# Patient Record
Sex: MALE | Race: WHITE | Employment: FULL TIME | URBAN - METROPOLITAN AREA
[De-identification: names, ages, dates, MRNs, and addresses within clinical notes are randomized per-mention and may not be internally consistent; named-entity substitution may affect disease eponyms.]

---

## 2017-04-17 ENCOUNTER — OFFICE VISIT (OUTPATIENT)
Dept: PODIATRY | Age: 62
End: 2017-04-17
Payer: COMMERCIAL

## 2017-04-17 VITALS
SYSTOLIC BLOOD PRESSURE: 137 MMHG | HEIGHT: 74 IN | DIASTOLIC BLOOD PRESSURE: 94 MMHG | WEIGHT: 280 LBS | HEART RATE: 70 BPM | BODY MASS INDEX: 35.94 KG/M2

## 2017-04-17 DIAGNOSIS — M79.671 RIGHT FOOT PAIN: Primary | ICD-10-CM

## 2017-04-17 PROCEDURE — 99213 OFFICE O/P EST LOW 20 MIN: CPT | Performed by: PODIATRIST

## 2017-05-01 ENCOUNTER — OFFICE VISIT (OUTPATIENT)
Dept: PODIATRY | Age: 62
End: 2017-05-01
Payer: COMMERCIAL

## 2017-05-01 VITALS
SYSTOLIC BLOOD PRESSURE: 124 MMHG | WEIGHT: 280 LBS | HEIGHT: 74 IN | DIASTOLIC BLOOD PRESSURE: 81 MMHG | BODY MASS INDEX: 35.94 KG/M2 | HEART RATE: 85 BPM

## 2017-05-01 DIAGNOSIS — M79.671 RIGHT FOOT PAIN: Primary | ICD-10-CM

## 2017-05-01 DIAGNOSIS — M19.90 ARTHRITIS: ICD-10-CM

## 2017-05-01 PROCEDURE — G8417 CALC BMI ABV UP PARAM F/U: HCPCS | Performed by: PODIATRIST

## 2017-05-01 PROCEDURE — 3017F COLORECTAL CA SCREEN DOC REV: CPT | Performed by: PODIATRIST

## 2017-05-01 PROCEDURE — 1036F TOBACCO NON-USER: CPT | Performed by: PODIATRIST

## 2017-05-01 PROCEDURE — G8427 DOCREV CUR MEDS BY ELIG CLIN: HCPCS | Performed by: PODIATRIST

## 2017-05-01 PROCEDURE — 20605 DRAIN/INJ JOINT/BURSA W/O US: CPT | Performed by: PODIATRIST

## 2017-05-01 RX ORDER — ATORVASTATIN CALCIUM 20 MG/1
TABLET, FILM COATED ORAL
COMMUNITY
Start: 2017-05-01 | End: 2017-05-01 | Stop reason: SDUPTHER

## 2017-05-01 RX ORDER — PREDNISONE 20 MG/1
TABLET ORAL
Refills: 0 | Status: ON HOLD | COMMUNITY
Start: 2017-04-17 | End: 2022-02-02 | Stop reason: HOSPADM

## 2017-05-01 RX ADMIN — BETAMETHASONE SODIUM PHOSPHATE AND BETAMETHASONE ACETATE 6 MG: 3; 3 INJECTION, SUSPENSION INTRA-ARTICULAR; INTRALESIONAL; INTRAMUSCULAR; SOFT TISSUE at 16:12

## 2017-05-02 RX ORDER — BETAMETHASONE SODIUM PHOSPHATE AND BETAMETHASONE ACETATE 3; 3 MG/ML; MG/ML
6 INJECTION, SUSPENSION INTRA-ARTICULAR; INTRALESIONAL; INTRAMUSCULAR; SOFT TISSUE ONCE
Status: COMPLETED | OUTPATIENT
Start: 2017-05-01 | End: 2017-05-01

## 2017-05-15 ENCOUNTER — OFFICE VISIT (OUTPATIENT)
Dept: PODIATRY | Age: 62
End: 2017-05-15
Payer: COMMERCIAL

## 2017-05-15 VITALS
SYSTOLIC BLOOD PRESSURE: 127 MMHG | HEART RATE: 84 BPM | HEIGHT: 74 IN | BODY MASS INDEX: 35.94 KG/M2 | WEIGHT: 280 LBS | DIASTOLIC BLOOD PRESSURE: 82 MMHG

## 2017-05-15 DIAGNOSIS — M19.90 ARTHRITIS PAIN: ICD-10-CM

## 2017-05-15 DIAGNOSIS — M79.671 RIGHT FOOT PAIN: Primary | ICD-10-CM

## 2017-05-15 PROCEDURE — 1036F TOBACCO NON-USER: CPT | Performed by: PODIATRIST

## 2017-05-15 PROCEDURE — G8417 CALC BMI ABV UP PARAM F/U: HCPCS | Performed by: PODIATRIST

## 2017-05-15 PROCEDURE — 99213 OFFICE O/P EST LOW 20 MIN: CPT | Performed by: PODIATRIST

## 2017-05-15 PROCEDURE — G8427 DOCREV CUR MEDS BY ELIG CLIN: HCPCS | Performed by: PODIATRIST

## 2017-05-15 PROCEDURE — 3017F COLORECTAL CA SCREEN DOC REV: CPT | Performed by: PODIATRIST

## 2018-01-11 ENCOUNTER — OFFICE VISIT (OUTPATIENT)
Dept: PODIATRY | Age: 63
End: 2018-01-11
Payer: COMMERCIAL

## 2018-01-11 VITALS — WEIGHT: 290 LBS | BODY MASS INDEX: 37.22 KG/M2 | HEIGHT: 74 IN

## 2018-01-11 DIAGNOSIS — M79.672 LEFT FOOT PAIN: Primary | ICD-10-CM

## 2018-01-11 DIAGNOSIS — M72.2 PLANTAR FASCIITIS: ICD-10-CM

## 2018-01-11 PROBLEM — L30.9 DERMATITIS: Status: ACTIVE | Noted: 2017-12-18

## 2018-01-11 PROBLEM — F40.243 FEAR OF FLYING: Status: ACTIVE | Noted: 2017-01-20

## 2018-01-11 PROBLEM — S39.012A STRAIN OF LUMBAR REGION: Status: ACTIVE | Noted: 2017-01-20

## 2018-01-11 PROBLEM — K21.9 GASTROESOPHAGEAL REFLUX DISEASE: Status: ACTIVE | Noted: 2017-01-20

## 2018-01-11 PROBLEM — G47.33 OBSTRUCTIVE SLEEP APNEA SYNDROME: Status: ACTIVE | Noted: 2017-01-20

## 2018-01-11 PROBLEM — J06.9 VIRAL UPPER RESPIRATORY TRACT INFECTION: Status: ACTIVE | Noted: 2017-11-14

## 2018-01-11 PROBLEM — J30.0 ACUTE VASOMOTOR RHINITIS: Status: ACTIVE | Noted: 2017-11-14

## 2018-01-11 PROBLEM — E78.5 HYPERLIPIDEMIA: Status: ACTIVE | Noted: 2017-01-20

## 2018-01-11 PROBLEM — K43.2 VENTRAL INCISIONAL HERNIA: Status: ACTIVE | Noted: 2017-01-20

## 2018-01-11 PROBLEM — I42.9 CARDIOMYOPATHY (HCC): Status: ACTIVE | Noted: 2017-05-17

## 2018-01-11 PROBLEM — L30.9 ECZEMA: Status: ACTIVE | Noted: 2017-01-20

## 2018-01-11 PROBLEM — I10 HYPERTENSION: Status: ACTIVE | Noted: 2017-01-20

## 2018-01-11 PROBLEM — E66.9 ADIPOSITY: Status: ACTIVE | Noted: 2017-01-20

## 2018-01-11 PROBLEM — B00.9 HERPES SIMPLEX TYPE 1 INFECTION: Status: ACTIVE | Noted: 2017-01-20

## 2018-01-11 PROBLEM — M17.9 OSTEOARTHRITIS OF KNEE: Status: ACTIVE | Noted: 2017-01-20

## 2018-01-11 PROBLEM — K56.2 INTESTINAL VOLVULUS (HCC): Status: ACTIVE | Noted: 2017-01-20

## 2018-01-11 PROCEDURE — G8484 FLU IMMUNIZE NO ADMIN: HCPCS | Performed by: PODIATRIST

## 2018-01-11 PROCEDURE — G8427 DOCREV CUR MEDS BY ELIG CLIN: HCPCS | Performed by: PODIATRIST

## 2018-01-11 PROCEDURE — G8417 CALC BMI ABV UP PARAM F/U: HCPCS | Performed by: PODIATRIST

## 2018-01-11 PROCEDURE — L3020 FOOT LONGITUD/METATARSAL SUP: HCPCS | Performed by: PODIATRIST

## 2018-01-11 PROCEDURE — 1036F TOBACCO NON-USER: CPT | Performed by: PODIATRIST

## 2018-01-11 PROCEDURE — 3017F COLORECTAL CA SCREEN DOC REV: CPT | Performed by: PODIATRIST

## 2018-01-11 PROCEDURE — 20550 NJX 1 TENDON SHEATH/LIGAMENT: CPT | Performed by: PODIATRIST

## 2018-01-11 NOTE — PROGRESS NOTES
[facility] Podiatry  Return Patient     Wilton April 60 y.o. male that presents for follow-up of   Chief Complaint   Patient presents with    Foot Pain     Left Foot       Symptoms began 1 week(s) ago and are unchanged . Patient relates pain is Present. Pain is rated 5 out of 10 and is described as constant, mild, moderate. Treatments prior to today's visit include: yes, icing and OTC pain meds. Currently denies F/C/N/V. No Known Allergies    Past Medical History:   Diagnosis Date    Adiposity 1/20/2017    Atrial fibrillation and flutter ([facility] [address]     Initially DX 2010    Cancer ([facility] [address] 8-15    Prostate Cancer, having OR in 10-15    Chronic kidney disease     Fatigue     Fear of flying 1/20/2017    GERD (gastroesophageal reflux disease)     Hyperlipidemia     Hypertension     Osteoarthritis of knee 1/20/2017    Overactive bladder 12/19/2016    Palpitations     Sleep apnea     uses C-pap       Prior to Admission medications    Medication Sig Start Date End Date Taking? Authorizing Provider   predniSONE (DELTASONE) 20 MG tablet TAKE 1 TABLET BY MOUTH THREE TIMES DAILY FOR 3 DAYS THEN 1,TABLET. ..  (REFER TO PRESCRIPTION NOTES). 4/17/17  Yes Historical Provider, MD   ibuprofen (ADVIL;MOTRIN) 400 MG tablet Take 1 tablet by mouth 3 times daily (with meals) 8/16/15  Yes Roxi Keyes MD   sotalol (BETAPACE) 80 MG tablet Take 1 tablet by mouth 2 times daily 8/14/15  Yes Meredith Arambula MD   apixaban (ELIQUIS) 5 MG TABS tablet Take 5 mg by mouth 2 times daily   Yes Historical Provider, MD   atorvastatin (LIPITOR) 20 MG tablet  3/4/15  Yes Historical Provider, MD   pantoprazole (PROTONIX) 40 MG tablet   daily  4/16/15  Yes Historical Provider, MD   VESICARE 5 MG tablet   daily  4/16/15  Yes Historical Provider, MD   tamsulosin (FLOMAX) 0.4 MG capsule   daily  4/22/15  Yes Historical Provider, MD       Review of Systems    Lower Extremity Physical Examination:     Vitals:  There were no vitals The patient is to call any questions, comments, or concerns. Patient was casted for custom molded orthotics today. I feel that these appliances are medically necessary for my patients well being and in my opinion are both reasonable and necessary to the accepted medical practice in the treatment of the above conditions. Custom molded orthotics prevent the over pronation which is leading to excessive tension of the plantar fascia. Abnormal foot/leg functions demands mechanical, functional protections and control. Without custom molded orthotics, the patient may develop chronic pain in her feet. Later on in life, the patient may develop ankle, shin, knee and hip pain as well. In trial usage of felt pads with Lo-Dye ankle strapping to mimic the effects of the orthotics, the patient responded with reduced symptoms and better foot function. Description of the devices: These devices are Root biomechanical orthotic devices made of a plastic polymer with a leather or Spenco-type top cover. These appliances control biomechanical aberrations of the patients feet and accommodate painful areas. Orthotics are not intended to be worn in lieu of shoes, but are removable devices formed from casts of the patients feet and then sent to an independent laboratory for fabrication. Due to the nature of my patients condition, I feel that this therapy is necessary indefinitely, as this is a form of continuous therapy. This is NOT a convenience item. It is my opinion that these devices will prevent the need for costly hospital surgery, further pain and discomfort. The total fee has been itemized to include biomechanical examination, casting of the feet and actual fabrication by the outside laboratory. Verbal and written instructions given to patient. Contact office with any questions/problems/concerns. Orally prescribed OTC orthotics. No orders of the defined types were placed in this encounter.     No

## 2018-01-25 ENCOUNTER — OFFICE VISIT (OUTPATIENT)
Dept: PODIATRY | Age: 63
End: 2018-01-25
Payer: COMMERCIAL

## 2018-01-25 VITALS
RESPIRATION RATE: 16 BRPM | TEMPERATURE: 98.2 F | HEIGHT: 74 IN | SYSTOLIC BLOOD PRESSURE: 126 MMHG | DIASTOLIC BLOOD PRESSURE: 76 MMHG | WEIGHT: 304 LBS | BODY MASS INDEX: 39.01 KG/M2 | HEART RATE: 68 BPM

## 2018-01-25 DIAGNOSIS — M79.672 PAIN IN LEFT FOOT: ICD-10-CM

## 2018-01-25 DIAGNOSIS — M72.2 PLANTAR FASCIAL FIBROMATOSIS: Primary | ICD-10-CM

## 2018-01-25 PROCEDURE — 3017F COLORECTAL CA SCREEN DOC REV: CPT | Performed by: PODIATRIST

## 2018-01-25 PROCEDURE — 99213 OFFICE O/P EST LOW 20 MIN: CPT | Performed by: PODIATRIST

## 2018-01-25 PROCEDURE — G8484 FLU IMMUNIZE NO ADMIN: HCPCS | Performed by: PODIATRIST

## 2018-01-25 PROCEDURE — G8427 DOCREV CUR MEDS BY ELIG CLIN: HCPCS | Performed by: PODIATRIST

## 2018-01-25 PROCEDURE — G8417 CALC BMI ABV UP PARAM F/U: HCPCS | Performed by: PODIATRIST

## 2018-01-25 PROCEDURE — 1036F TOBACCO NON-USER: CPT | Performed by: PODIATRIST

## 2018-01-30 RX ORDER — BETAMETHASONE SODIUM PHOSPHATE AND BETAMETHASONE ACETATE 3; 3 MG/ML; MG/ML
6 INJECTION, SUSPENSION INTRA-ARTICULAR; INTRALESIONAL; INTRAMUSCULAR; SOFT TISSUE ONCE
Status: COMPLETED | OUTPATIENT
Start: 2018-01-11 | End: 2018-01-30

## 2018-01-30 RX ADMIN — BETAMETHASONE SODIUM PHOSPHATE AND BETAMETHASONE ACETATE 6 MG: 3; 3 INJECTION, SUSPENSION INTRA-ARTICULAR; INTRALESIONAL; INTRAMUSCULAR; SOFT TISSUE at 14:09

## 2018-05-16 ENCOUNTER — OFFICE VISIT (OUTPATIENT)
Dept: PODIATRY | Age: 63
End: 2018-05-16
Payer: MEDICARE

## 2018-05-16 VITALS — BODY MASS INDEX: 38.12 KG/M2 | HEIGHT: 74 IN | WEIGHT: 297 LBS

## 2018-05-16 DIAGNOSIS — G57.62 MORTON'S NEUROMA OF LEFT FOOT: Primary | ICD-10-CM

## 2018-05-16 DIAGNOSIS — M79.672 PAIN IN LEFT FOOT: ICD-10-CM

## 2018-05-16 PROCEDURE — 73630 X-RAY EXAM OF FOOT: CPT | Performed by: PODIATRIST

## 2018-05-16 PROCEDURE — 99213 OFFICE O/P EST LOW 20 MIN: CPT | Performed by: PODIATRIST

## 2018-05-16 PROCEDURE — 20550 NJX 1 TENDON SHEATH/LIGAMENT: CPT | Performed by: PODIATRIST

## 2018-05-16 RX ORDER — BETAMETHASONE SODIUM PHOSPHATE AND BETAMETHASONE ACETATE 3; 3 MG/ML; MG/ML
6 INJECTION, SUSPENSION INTRA-ARTICULAR; INTRALESIONAL; INTRAMUSCULAR; SOFT TISSUE ONCE
Status: COMPLETED | OUTPATIENT
Start: 2018-05-16 | End: 2018-05-30

## 2018-05-30 ENCOUNTER — OFFICE VISIT (OUTPATIENT)
Dept: PODIATRY | Age: 63
End: 2018-05-30
Payer: MEDICARE

## 2018-05-30 VITALS — BODY MASS INDEX: 38.12 KG/M2 | HEIGHT: 74 IN | WEIGHT: 297 LBS

## 2018-05-30 DIAGNOSIS — G57.62 MORTON'S NEUROMA OF LEFT FOOT: Primary | ICD-10-CM

## 2018-05-30 DIAGNOSIS — M79.672 PAIN IN LEFT FOOT: ICD-10-CM

## 2018-05-30 PROCEDURE — 99213 OFFICE O/P EST LOW 20 MIN: CPT | Performed by: PODIATRIST

## 2018-05-30 RX ORDER — ATORVASTATIN CALCIUM 20 MG/1
20 TABLET, FILM COATED ORAL
COMMUNITY
Start: 2018-02-21

## 2018-05-30 RX ADMIN — BETAMETHASONE SODIUM PHOSPHATE AND BETAMETHASONE ACETATE 6 MG: 3; 3 INJECTION, SUSPENSION INTRA-ARTICULAR; INTRALESIONAL; INTRAMUSCULAR; SOFT TISSUE at 15:56

## 2018-06-01 RX ORDER — BETAMETHASONE SODIUM PHOSPHATE AND BETAMETHASONE ACETATE 3; 3 MG/ML; MG/ML
3 INJECTION, SUSPENSION INTRA-ARTICULAR; INTRALESIONAL; INTRAMUSCULAR; SOFT TISSUE ONCE
Status: SHIPPED | OUTPATIENT
Start: 2018-06-01

## 2019-11-07 ENCOUNTER — HOSPITAL ENCOUNTER (OUTPATIENT)
Age: 64
Discharge: HOME | End: 2019-11-07
Payer: COMMERCIAL

## 2019-11-07 ENCOUNTER — HOSPITAL ENCOUNTER (OUTPATIENT)
Dept: GENERAL RADIOLOGY | Age: 64
Discharge: HOME | End: 2019-11-09
Payer: COMMERCIAL

## 2019-11-07 DIAGNOSIS — T14.90XA INJURY: ICD-10-CM

## 2019-11-07 PROCEDURE — 73630 X-RAY EXAM OF FOOT: CPT

## 2020-04-06 ENCOUNTER — APPOINTMENT (OUTPATIENT)
Dept: CT IMAGING | Facility: HOSPITAL | Age: 65
End: 2020-04-06
Attending: EMERGENCY MEDICINE
Payer: MEDICARE

## 2020-04-06 ENCOUNTER — HOSPITAL ENCOUNTER (EMERGENCY)
Facility: HOSPITAL | Age: 65
Discharge: HOME | End: 2020-04-07
Attending: EMERGENCY MEDICINE
Payer: MEDICARE

## 2020-04-06 DIAGNOSIS — T14.8XXA ABRASION: Primary | ICD-10-CM
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
## 2020-04-06 PROCEDURE — 99284 EMERGENCY DEPT VISIT MOD MDM: CPT

## 2020-04-06 PROCEDURE — 72125 CT NECK SPINE W/O DYE: CPT | Performed by: EMERGENCY MEDICINE

## 2020-04-06 PROCEDURE — 70450 CT HEAD/BRAIN W/O DYE: CPT | Performed by: EMERGENCY MEDICINE

## 2020-04-07 VITALS
SYSTOLIC BLOOD PRESSURE: 137 MMHG | DIASTOLIC BLOOD PRESSURE: 91 MMHG | WEIGHT: 160 LBS | HEART RATE: 77 BPM | OXYGEN SATURATION: 99 % | BODY MASS INDEX: 22.4 KG/M2 | RESPIRATION RATE: 18 BRPM | TEMPERATURE: 98 F | HEIGHT: 71 IN

## 2020-04-07 NOTE — ED PROVIDER NOTES
Patient Seen in: [facility] AND [facility] Emergency Department      History   Patient presents with:  Fall    Stated Complaint: fall    HPI    72 yo male with h/o dementia, unable to give history. Found on the ground by NH staff. He has no complaints.      [facility] ED Course   Labs Reviewed - No data to display               MDM        CT head without contrast  CT cervical spine without contrast    IMPRESSION:    Head:  No acute intracranial injury. No acute intracranial hemorrhage, mass effect, or midline shift.

## 2020-04-07 NOTE — ED INITIAL ASSESSMENT (HPI)
Patient presents from nursing facility after being found on the floor; staff believes patient fell. Patient states he believes he might have \"slipped. \" Patient has no complaints at this time.

## 2022-01-23 ENCOUNTER — HOSPITAL ENCOUNTER (INPATIENT)
Age: 67
LOS: 2 days | Discharge: HOME | DRG: 310 | End: 2022-01-25
Attending: EMERGENCY MEDICINE | Admitting: INTERNAL MEDICINE
Payer: MEDICARE

## 2022-01-23 ENCOUNTER — APPOINTMENT (OUTPATIENT)
Dept: GENERAL RADIOLOGY | Age: 67
DRG: 310 | End: 2022-01-23
Payer: MEDICARE

## 2022-01-23 DIAGNOSIS — I48.91 ATRIAL FIBRILLATION WITH RVR (HCC): Primary | ICD-10-CM

## 2022-01-23 PROBLEM — Z90.79 S/P PROSTATECTOMY: Status: ACTIVE | Noted: 2022-01-23

## 2022-01-23 LAB
ABSOLUTE EOS #: 0.22 K/UL (ref 0–0.44)
ABSOLUTE IMMATURE GRANULOCYTE: 0.04 K/UL (ref 0–0.3)
ABSOLUTE LYMPH #: 1.57 K/UL (ref 1.1–3.7)
ABSOLUTE MONO #: 0.59 K/UL (ref 0.1–1.2)
ANION GAP SERPL CALCULATED.3IONS-SCNC: 11 MMOL/L (ref 9–17)
BASOPHILS # BLD: 1 % (ref 0–2)
BASOPHILS ABSOLUTE: 0.08 K/UL (ref 0–0.2)
BNP INTERPRETATION: NORMAL
BUN BLDV-MCNC: 15 MG/DL (ref 8–23)
BUN/CREAT BLD: NORMAL (ref 9–20)
CALCIUM SERPL-MCNC: 9.3 MG/DL (ref 8.6–10.4)
CHLORIDE BLD-SCNC: 107 MMOL/L (ref 98–107)
CO2: 23 MMOL/L (ref 20–31)
CREAT SERPL-MCNC: 1.2 MG/DL (ref 0.7–1.2)
DIFFERENTIAL TYPE: ABNORMAL
EOSINOPHILS RELATIVE PERCENT: 4 % (ref 1–4)
GFR AFRICAN AMERICAN: >60 ML/MIN
GFR NON-AFRICAN AMERICAN: >60 ML/MIN
GFR SERPL CREATININE-BSD FRML MDRD: NORMAL ML/MIN/{1.73_M2}
GFR SERPL CREATININE-BSD FRML MDRD: NORMAL ML/MIN/{1.73_M2}
GLUCOSE BLD-MCNC: 94 MG/DL (ref 70–99)
HCT VFR BLD CALC: 47.8 % (ref 40.7–50.3)
HEMOGLOBIN: 15.9 G/DL (ref 13–17)
IMMATURE GRANULOCYTES: 1 %
LYMPHOCYTES # BLD: 25 % (ref 24–43)
MCH RBC QN AUTO: 27.9 PG (ref 25.2–33.5)
MCHC RBC AUTO-ENTMCNC: 33.3 G/DL (ref 28.4–34.8)
MCV RBC AUTO: 84 FL (ref 82.6–102.9)
MONOCYTES # BLD: 9 % (ref 3–12)
NRBC AUTOMATED: 0 PER 100 WBC
PARTIAL THROMBOPLASTIN TIME: 26.1 SEC (ref 20.5–30.5)
PARTIAL THROMBOPLASTIN TIME: 64.2 SEC (ref 20.5–30.5)
PARTIAL THROMBOPLASTIN TIME: >120 SEC (ref 20.5–30.5)
PDW BLD-RTO: 14.4 % (ref 11.8–14.4)
PLATELET # BLD: 177 K/UL (ref 138–453)
PLATELET ESTIMATE: ABNORMAL
PMV BLD AUTO: 11 FL (ref 8.1–13.5)
POTASSIUM SERPL-SCNC: 4 MMOL/L (ref 3.7–5.3)
PRO-BNP: 160 PG/ML
RBC # BLD: 5.69 M/UL (ref 4.21–5.77)
RBC # BLD: ABNORMAL 10*6/UL
SEG NEUTROPHILS: 60 % (ref 36–65)
SEGMENTED NEUTROPHILS ABSOLUTE COUNT: 3.86 K/UL (ref 1.5–8.1)
SODIUM BLD-SCNC: 141 MMOL/L (ref 135–144)
TROPONIN INTERP: NORMAL
TROPONIN INTERP: NORMAL
TROPONIN T: NORMAL NG/ML
TROPONIN T: NORMAL NG/ML
TROPONIN, HIGH SENSITIVITY: 11 NG/L (ref 0–22)
TROPONIN, HIGH SENSITIVITY: 12 NG/L (ref 0–22)
WBC # BLD: 6.4 K/UL (ref 3.5–11.3)
WBC # BLD: ABNORMAL 10*3/UL

## 2022-01-23 PROCEDURE — 2580000003 HC RX 258: Performed by: STUDENT IN AN ORGANIZED HEALTH CARE EDUCATION/TRAINING PROGRAM

## 2022-01-23 PROCEDURE — 96376 TX/PRO/DX INJ SAME DRUG ADON: CPT

## 2022-01-23 PROCEDURE — 6360000002 HC RX W HCPCS: Performed by: STUDENT IN AN ORGANIZED HEALTH CARE EDUCATION/TRAINING PROGRAM

## 2022-01-23 PROCEDURE — 99282 EMERGENCY DEPT VISIT SF MDM: CPT

## 2022-01-23 PROCEDURE — 71045 X-RAY EXAM CHEST 1 VIEW: CPT

## 2022-01-23 PROCEDURE — 85730 THROMBOPLASTIN TIME PARTIAL: CPT

## 2022-01-23 PROCEDURE — 83880 ASSAY OF NATRIURETIC PEPTIDE: CPT

## 2022-01-23 PROCEDURE — 99223 1ST HOSP IP/OBS HIGH 75: CPT | Performed by: INTERNAL MEDICINE

## 2022-01-23 PROCEDURE — 2500000003 HC RX 250 WO HCPCS

## 2022-01-23 PROCEDURE — 84484 ASSAY OF TROPONIN QUANT: CPT

## 2022-01-23 PROCEDURE — 2060000000 HC ICU INTERMEDIATE R&B

## 2022-01-23 PROCEDURE — 2500000003 HC RX 250 WO HCPCS: Performed by: STUDENT IN AN ORGANIZED HEALTH CARE EDUCATION/TRAINING PROGRAM

## 2022-01-23 PROCEDURE — 85025 COMPLETE CBC W/AUTO DIFF WBC: CPT

## 2022-01-23 PROCEDURE — 93005 ELECTROCARDIOGRAM TRACING: CPT | Performed by: STUDENT IN AN ORGANIZED HEALTH CARE EDUCATION/TRAINING PROGRAM

## 2022-01-23 PROCEDURE — 6360000002 HC RX W HCPCS

## 2022-01-23 PROCEDURE — 96374 THER/PROPH/DIAG INJ IV PUSH: CPT

## 2022-01-23 PROCEDURE — 80048 BASIC METABOLIC PNL TOTAL CA: CPT

## 2022-01-23 RX ORDER — METOPROLOL TARTRATE 5 MG/5ML
5 INJECTION INTRAVENOUS EVERY 5 MIN PRN
Status: DISCONTINUED | OUTPATIENT
Start: 2022-01-23 | End: 2022-01-23

## 2022-01-23 RX ORDER — DIGOXIN 0.25 MG/ML
250 INJECTION INTRAMUSCULAR; INTRAVENOUS ONCE
Status: COMPLETED | OUTPATIENT
Start: 2022-01-23 | End: 2022-01-23

## 2022-01-23 RX ORDER — POTASSIUM CHLORIDE 20 MEQ/1
40 TABLET, EXTENDED RELEASE ORAL PRN
Status: DISCONTINUED | OUTPATIENT
Start: 2022-01-23 | End: 2022-01-25 | Stop reason: HOSPADM

## 2022-01-23 RX ORDER — POTASSIUM CHLORIDE 7.45 MG/ML
10 INJECTION INTRAVENOUS PRN
Status: DISCONTINUED | OUTPATIENT
Start: 2022-01-23 | End: 2022-01-25 | Stop reason: HOSPADM

## 2022-01-23 RX ORDER — HEPARIN SODIUM 1000 [USP'U]/ML
10000 INJECTION, SOLUTION INTRAVENOUS; SUBCUTANEOUS PRN
Status: DISCONTINUED | OUTPATIENT
Start: 2022-01-23 | End: 2022-01-25 | Stop reason: HOSPADM

## 2022-01-23 RX ORDER — ONDANSETRON 4 MG/1
4 TABLET, ORALLY DISINTEGRATING ORAL EVERY 8 HOURS PRN
Status: DISCONTINUED | OUTPATIENT
Start: 2022-01-23 | End: 2022-01-23

## 2022-01-23 RX ORDER — PANTOPRAZOLE SODIUM 40 MG/1
40 TABLET, DELAYED RELEASE ORAL
Status: DISCONTINUED | OUTPATIENT
Start: 2022-01-24 | End: 2022-01-25 | Stop reason: HOSPADM

## 2022-01-23 RX ORDER — HEPARIN SODIUM 1000 [USP'U]/ML
4000 INJECTION, SOLUTION INTRAVENOUS; SUBCUTANEOUS ONCE
Status: DISCONTINUED | OUTPATIENT
Start: 2022-01-23 | End: 2022-01-23

## 2022-01-23 RX ORDER — POLYETHYLENE GLYCOL 3350 17 G/17G
17 POWDER, FOR SOLUTION ORAL DAILY PRN
Status: DISCONTINUED | OUTPATIENT
Start: 2022-01-23 | End: 2022-01-25 | Stop reason: HOSPADM

## 2022-01-23 RX ORDER — ONDANSETRON 2 MG/ML
4 INJECTION INTRAMUSCULAR; INTRAVENOUS EVERY 6 HOURS PRN
Status: DISCONTINUED | OUTPATIENT
Start: 2022-01-23 | End: 2022-01-25 | Stop reason: HOSPADM

## 2022-01-23 RX ORDER — DILTIAZEM HYDROCHLORIDE 5 MG/ML
20 INJECTION INTRAVENOUS ONCE
Status: DISCONTINUED | OUTPATIENT
Start: 2022-01-23 | End: 2022-01-23

## 2022-01-23 RX ORDER — ACETAMINOPHEN 325 MG/1
650 TABLET ORAL EVERY 6 HOURS PRN
Status: DISCONTINUED | OUTPATIENT
Start: 2022-01-23 | End: 2022-01-25 | Stop reason: HOSPADM

## 2022-01-23 RX ORDER — HEPARIN SODIUM 1000 [USP'U]/ML
10000 INJECTION, SOLUTION INTRAVENOUS; SUBCUTANEOUS ONCE
Status: COMPLETED | OUTPATIENT
Start: 2022-01-23 | End: 2022-01-23

## 2022-01-23 RX ORDER — DILTIAZEM HYDROCHLORIDE 5 MG/ML
INJECTION INTRAVENOUS
Status: COMPLETED
Start: 2022-01-23 | End: 2022-01-23

## 2022-01-23 RX ORDER — HEPARIN SODIUM 1000 [USP'U]/ML
5000 INJECTION, SOLUTION INTRAVENOUS; SUBCUTANEOUS PRN
Status: DISCONTINUED | OUTPATIENT
Start: 2022-01-23 | End: 2022-01-25 | Stop reason: HOSPADM

## 2022-01-23 RX ORDER — DILTIAZEM HYDROCHLORIDE 5 MG/ML
20 INJECTION INTRAVENOUS ONCE
Status: COMPLETED | OUTPATIENT
Start: 2022-01-23 | End: 2022-01-23

## 2022-01-23 RX ORDER — SODIUM CHLORIDE 0.9 % (FLUSH) 0.9 %
5-40 SYRINGE (ML) INJECTION EVERY 12 HOURS SCHEDULED
Status: DISCONTINUED | OUTPATIENT
Start: 2022-01-23 | End: 2022-01-25 | Stop reason: HOSPADM

## 2022-01-23 RX ORDER — ONDANSETRON 2 MG/ML
4 INJECTION INTRAMUSCULAR; INTRAVENOUS EVERY 6 HOURS PRN
Status: DISCONTINUED | OUTPATIENT
Start: 2022-01-23 | End: 2022-01-23

## 2022-01-23 RX ORDER — HEPARIN SODIUM 1000 [USP'U]/ML
4000 INJECTION, SOLUTION INTRAVENOUS; SUBCUTANEOUS PRN
Status: DISCONTINUED | OUTPATIENT
Start: 2022-01-23 | End: 2022-01-23

## 2022-01-23 RX ORDER — SODIUM CHLORIDE 0.9 % (FLUSH) 0.9 %
5-40 SYRINGE (ML) INJECTION PRN
Status: DISCONTINUED | OUTPATIENT
Start: 2022-01-23 | End: 2022-01-25 | Stop reason: HOSPADM

## 2022-01-23 RX ORDER — SODIUM CHLORIDE 9 MG/ML
25 INJECTION, SOLUTION INTRAVENOUS PRN
Status: DISCONTINUED | OUTPATIENT
Start: 2022-01-23 | End: 2022-01-25 | Stop reason: HOSPADM

## 2022-01-23 RX ORDER — DILTIAZEM HYDROCHLORIDE 5 MG/ML
5 INJECTION INTRAVENOUS ONCE
Status: DISCONTINUED | OUTPATIENT
Start: 2022-01-23 | End: 2022-01-23

## 2022-01-23 RX ORDER — ACETAMINOPHEN 325 MG/1
650 TABLET ORAL EVERY 4 HOURS PRN
Status: DISCONTINUED | OUTPATIENT
Start: 2022-01-23 | End: 2022-01-23

## 2022-01-23 RX ORDER — ONDANSETRON 4 MG/1
4 TABLET, ORALLY DISINTEGRATING ORAL EVERY 8 HOURS PRN
Status: DISCONTINUED | OUTPATIENT
Start: 2022-01-23 | End: 2022-01-25 | Stop reason: HOSPADM

## 2022-01-23 RX ORDER — ACETAMINOPHEN 650 MG/1
650 SUPPOSITORY RECTAL EVERY 6 HOURS PRN
Status: DISCONTINUED | OUTPATIENT
Start: 2022-01-23 | End: 2022-01-25 | Stop reason: HOSPADM

## 2022-01-23 RX ORDER — METOPROLOL TARTRATE 5 MG/5ML
5 INJECTION INTRAVENOUS EVERY 6 HOURS PRN
Status: DISCONTINUED | OUTPATIENT
Start: 2022-01-23 | End: 2022-01-25 | Stop reason: HOSPADM

## 2022-01-23 RX ORDER — HEPARIN SODIUM 1000 [USP'U]/ML
2000 INJECTION, SOLUTION INTRAVENOUS; SUBCUTANEOUS PRN
Status: DISCONTINUED | OUTPATIENT
Start: 2022-01-23 | End: 2022-01-23

## 2022-01-23 RX ORDER — SODIUM CHLORIDE 9 MG/ML
INJECTION, SOLUTION INTRAVENOUS CONTINUOUS
Status: DISCONTINUED | OUTPATIENT
Start: 2022-01-23 | End: 2022-01-23

## 2022-01-23 RX ORDER — HEPARIN SODIUM 10000 [USP'U]/100ML
5-30 INJECTION, SOLUTION INTRAVENOUS CONTINUOUS
Status: DISCONTINUED | OUTPATIENT
Start: 2022-01-23 | End: 2022-01-25

## 2022-01-23 RX ORDER — HEPARIN SODIUM 10000 [USP'U]/100ML
5-30 INJECTION, SOLUTION INTRAVENOUS CONTINUOUS
Status: DISCONTINUED | OUTPATIENT
Start: 2022-01-23 | End: 2022-01-23

## 2022-01-23 RX ADMIN — DILTIAZEM HYDROCHLORIDE 20 MG: 5 INJECTION INTRAVENOUS at 13:27

## 2022-01-23 RX ADMIN — SODIUM CHLORIDE 1000 ML: 9 INJECTION, SOLUTION INTRAVENOUS at 13:28

## 2022-01-23 RX ADMIN — DIGOXIN 250 MCG: 250 INJECTION, SOLUTION INTRAMUSCULAR; INTRAVENOUS; PARENTERAL at 16:20

## 2022-01-23 RX ADMIN — DILTIAZEM HYDROCHLORIDE 20 MG: 5 INJECTION INTRAVENOUS at 12:23

## 2022-01-23 RX ADMIN — HEPARIN SODIUM 10000 UNITS: 1000 INJECTION INTRAVENOUS; SUBCUTANEOUS at 14:40

## 2022-01-23 RX ADMIN — HEPARIN SODIUM AND DEXTROSE 18 UNITS/KG/HR: 10000; 5 INJECTION INTRAVENOUS at 14:40

## 2022-01-23 RX ADMIN — DILTIAZEM HYDROCHLORIDE 5 MG/HR: 5 INJECTION INTRAVENOUS at 13:27

## 2022-01-23 ASSESSMENT — ENCOUNTER SYMPTOMS
BACK PAIN: 0
NAUSEA: 0
SHORTNESS OF BREATH: 1
CHEST TIGHTNESS: 0
FACIAL SWELLING: 0
ALLERGIC/IMMUNOLOGIC NEGATIVE: 1
SHORTNESS OF BREATH: 0
SINUS PAIN: 0
DIARRHEA: 0
COUGH: 0
CONSTIPATION: 0
VOMITING: 0
COLOR CHANGE: 0
ABDOMINAL PAIN: 0
SORE THROAT: 0
WHEEZING: 0

## 2022-01-23 NOTE — H&P
[address]     Department of Internal Medicine - Staff Internal Medicine Teaching Service          ADMISSION NOTE/HISTORY AND PHYSICAL EXAMINATION   Date: 1/23/2022  Patient Name: Liberty Goodpasture  Date of admission: 1/23/2022 12:14 PM  YOB: 1955  PCP: Ami Maldonado  History Obtained From: chart review and patient. CHIEF COMPLAINT     Chief complaint: palpitations    HISTORY OF PRESENTING ILLNESS     The patient is a 78 y.o. male who presented after complaining of palpitations which started this morning. Patient has a past medical history of atrial fibrillation status post ablation 6 to 7 years ago done by Dr. Liliana Banegas. According to the patient patient had palpitation associated with lightheadedness and dizziness. Patient denies any chest pain. Patient does complain of mild shortness of breath, no oxygen use at home. Patient is not on anticoagulation at home. On arrival to the ED, patient patient was tachycardic, BP on the higher side other vital signs stable patient was in atrial fibrillation rhythm. Patient was started on heparin and Cardizem drip. Pertinent labs were ordered which were unremarkable. Troponins and proBNP within normal limits. When I went to evaluate the patient, the patient was resting comfortably on the bed. According to the patient, he follows up with a cardiologist [place] [address]. Pt had COVID 3 weeks ago. 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 Social:  -Tobacco: quit yrs ago   -Alcohol: neg   -Illicit Drug Use: neg    Review of Systems:  Review of Systems   Constitutional: Negative for diaphoresis, fatigue and fever. HENT: Negative for facial swelling, hearing loss, sinus pain, sore throat and tinnitus. Respiratory: Positive for shortness of breath (mild). Negative for cough, chest tightness and wheezing. Cardiovascular: Positive for palpitations. Negative for chest pain and leg swelling. Gastrointestinal: Negative for abdominal pain, constipation, diarrhea, nausea and vomiting. Endocrine: Negative. Genitourinary: Negative for difficulty urinating, dysuria, flank pain and hematuria. Musculoskeletal: Negative for back pain. Skin: Negative for color change. Allergic/Immunologic: Negative. Neurological: Positive for light-headedness. Negative for seizures, weakness and headaches. Hematological: Negative. Psychiatric/Behavioral: Negative for agitation, confusion, hallucinations and suicidal ideas. All other systems reviewed and are negative. PAST MEDICAL HISTORY     Past Medical History:   Diagnosis Date    Adiposity 1/20/2017    Atrial fibrillation and flutter ([facility] [address]     Initially DX 2010    Cancer ([facility] [address] 8-15    Prostate Cancer, having OR in 10-15    Chronic kidney disease     Fatigue     Fear of flying 1/20/2017    GERD (gastroesophageal reflux disease)     Hyperlipidemia     Hypertension     Osteoarthritis of knee 1/20/2017    Overactive bladder 12/19/2016    Palpitations     Sleep apnea     uses C-pap       PAST SURGICAL HISTORY     Past Surgical History:   Procedure Laterality Date    ABLATION OF DYSRHYTHMIC FOCUS  8/13/15    CARDIAC SURGERY      ablation 8/13/2015    CARDIOVERSION      2010 and 7-15    HERNIA REPAIR      JOINT REPLACEMENT      Bilateral knees    KIDNEY STONE SURGERY  y-4       ALLERGIES     Patient has no known allergies. MEDICATIONS PRIOR TO ADMISSION     Prior to Admission medications    Medication Sig Start Date End Date Taking? Authorizing Provider   atorvastatin (LIPITOR) 20 MG tablet Take 20 mg by mouth 2/21/18   Historical Provider, MD   predniSONE (DELTASONE) 20 MG tablet TAKE 1 TABLET BY MOUTH THREE TIMES DAILY FOR 3 DAYS THEN 1,TABLET. ..  (REFER TO PRESCRIPTION NOTES).  4/17/17   Historical Provider, MD   ibuprofen (ADVIL;MOTRIN) 400 MG Capillary Refill: Capillary refill takes less than 2 seconds. Neurological:      Mental Status: He is alert and oriented to person, place, and time. Psychiatric:         Attention and Perception: Attention normal.         Mood and Affect: Mood normal.         Speech: Speech normal.         Behavior: Behavior is cooperative.          INVESTIGATIONS     Laboratory Testing:     Recent Results (from the past 24 hour(s))   CBC Auto Differential    Collection Time: 01/23/22 12:22 PM   Result Value Ref Range    WBC 6.4 3.5 - 11.3 k/uL    RBC 5.69 4.21 - 5.77 m/uL    Hemoglobin 15.9 13.0 - 17.0 g/dL    Hematocrit 47.8 40.7 - 50.3 %    MCV 84.0 82.6 - 102.9 fL    MCH 27.9 25.2 - 33.5 pg    MCHC 33.3 28.4 - 34.8 g/dL    RDW 14.4 11.8 - 14.4 %    Platelets 754 307 - 343 k/uL    MPV 11.0 8.1 - 13.5 fL    NRBC Automated 0.0 0.0 per 100 WBC    Differential Type NOT REPORTED     Seg Neutrophils 60 36 - 65 %    Lymphocytes 25 24 - 43 %    Monocytes 9 3 - 12 %    Eosinophils % 4 1 - 4 %    Basophils 1 0 - 2 %    Immature Granulocytes 1 (H) 0 %    Segs Absolute 3.86 1.50 - 8.10 k/uL    Absolute Lymph # 1.57 1.10 - 3.70 k/uL    Absolute Mono # 0.59 0.10 - 1.20 k/uL    Absolute Eos # 0.22 0.00 - 0.44 k/uL    Basophils Absolute 0.08 0.00 - 0.20 k/uL    Absolute Immature Granulocyte 0.04 0.00 - 0.30 k/uL    WBC Morphology NOT REPORTED     RBC Morphology NOT REPORTED     Platelet Estimate NOT REPORTED    Basic Metabolic Panel w/ Reflex to MG    Collection Time: 01/23/22 12:22 PM   Result Value Ref Range    Glucose 94 70 - 99 mg/dL    BUN 15 8 - 23 mg/dL    CREATININE 1.20 0.70 - 1.20 mg/dL    Bun/Cre Ratio NOT REPORTED 9 - 20    Calcium 9.3 8.6 - 10.4 mg/dL    Sodium 141 135 - 144 mmol/L    Potassium 4.0 3.7 - 5.3 mmol/L    Chloride 107 98 - 107 mmol/L    CO2 23 20 - 31 mmol/L    Anion Gap 11 9 - 17 mmol/L    GFR Non-African American >60 >60 mL/min    GFR African American >60 >60 mL/min    GFR Comment          GFR Staging NOT REPORTED    Troponin    Collection Time: 01/23/22 12:22 PM   Result Value Ref Range    Troponin, High Sensitivity 11 0 - 22 ng/L    Troponin T NOT REPORTED <0.03 ng/mL    Troponin Interp NOT REPORTED    Brain Natriuretic Peptide    Collection Time: 01/23/22 12:22 PM   Result Value Ref Range    Pro- <300 pg/mL    BNP Interpretation NOT REPORTED    APTT    Collection Time: 01/23/22 12:22 PM   Result Value Ref Range    PTT 26.1 20.5 - 30.5 sec   Troponin    Collection Time: 01/23/22  1:31 PM   Result Value Ref Range    Troponin, High Sensitivity 12 0 - 22 ng/L    Troponin T NOT REPORTED <0.03 ng/mL    Troponin Interp NOT REPORTED        Imaging:   XR CHEST PORTABLE    Result Date: 1/23/2022  Stable mild cardiomegaly. Mild pulmonary vascular congestion. ASSESSMENT & PLAN     IMPRESSION  This is a 78 y.o. male who presented with palpitations and found to have atrial fibrillation with RVR. Patient admitted to inpatient status for the management of Atrial fibrillation with RVR. Paroxysmal atrial fibrillation with RVR s/p ablation 6 to 7 years ago by Dr.Brenya GAYTANASc= 2  -Continue heparin drip  -Continue Cardizem drip  -Monitor HR  -Cardiology consulted    Obstructive Sleep Apnea  -BiPAP overnight    DVT ppx: Heparin  GI ppx: Protonix  Diet: Adult Regular Diet  PT/OT: Consulted  Case Management: Bryon Islas MD  PGY-1, Internal Medicine Resident  [facility]         1/23/2022, 2:29 PM Attending Physician Statement  I have discussed the care of Zuleima Morales, including pertinent history and exam findings,  with the resident. I have seen and examined the patient and the key elements of all parts of the encounter have been performed by me. I agree with the assessment, plan and orders as documented by the resident with additions . Treatment plan Discussed with nursing staff in detail , all questions answered .    Electronically signed by Julissa Barrera Anahi Johnson MD on   1/24/22 at 9:22 AM EST    Please note that this chart was generated using voice recognition Dragon dictation software. Although every effort was made to ensure the accuracy of this automated transcription, some errors in transcription may have occurred.

## 2022-01-23 NOTE — ED PROVIDER NOTES
FACULTY SIGN-OUT  ADDENDUM     Care of this patient was assumed from previous attending physician. The patient's initial evaluation and plan have been discussed with the prior provider who initially evaluated the patient. Attestation  I was available and discussed any additional care issues that arose and coordinated the management plans with the resident(s) caring for the patient during my duty period. Any areas of disagreement with resident's documentation of care or procedures are noted on the chart. I was personally present for the key portions of any/all procedures, during my duty period. I have documented in the chart those procedures where I was not present during the key portions. ED COURSE      The patient was given the following medications:  Orders Placed This Encounter   Medications    dilTIAZem 25 MG/5ML injection     Kalpana Sibley: cabinet override    dilTIAZem injection 20 mg    FOLLOWED BY Linked Order Group     dilTIAZem injection 20 mg     dilTIAZem 125 mg in dextrose 5 % 125 mL infusion    0.9 % sodium chloride infusion    DISCONTD: heparin (porcine) injection 4,000 Units    DISCONTD: heparin (porcine) injection 4,000 Units    DISCONTD: heparin (porcine) injection 2,000 Units    DISCONTD: heparin 25,000 units in dextrose 5% 250 mL (premix) infusion    heparin (porcine) injection 10,000 Units    heparin (porcine) injection 10,000 Units    heparin (porcine) injection 5,000 Units    heparin 25,000 units in dextrose 5% 250 mL (premix) infusion       RECENT VITALS:   Temp: 98.1 °F (36.7 °C), Pulse: 87, Resp: 21, BP: 112/71    MEDICAL DECISION MAKING        Coralee Sever is a 78 y.o. male who presents to the Emergency Department with complaints of palpitations. Afib RVR. ON cardizem and heparin gtts. Admit.       Taco Gong MD  Attending Emergency Physician    (Please note that portions of this note were completed with a voice recognition program.  Efforts were made to edit the dictations but occasionally words are mis-transcribed.)          Brenda Avina MD  01/23/22 [id number]

## 2022-01-23 NOTE — ED NOTES
Patient registered with the social security number given to nurse. Patient verified identity with 2 identifying factors.       Bobbi Dubois RN  01/23/22 [id number]

## 2022-01-23 NOTE — ED NOTES
The following labs labeled with pt sticker and tubed to lab:     [x] Blue     [x] Lavender   [] on ice  [x] Green/yellow  [x] Green/black [] on ice  [] Yellow  [] Red  [] Pink      [] COVID-19 swab    [] Rapid  [] PCR  [] Flu swab  [] Peds Viral Panel     [] Urine Sample  [] Pelvic Cultures  [] Blood Cultures            Rowan Baez RN  01/23/22 [id number]

## 2022-01-23 NOTE — ED NOTES
Patient on stretcher with no complaints. POC updated.  Will continue to monitor        Mike Deshpande RN  01/23/22 [id number]

## 2022-01-23 NOTE — ED PROVIDER NOTES
[address] [facility]     Emergency Department     Faculty Attestation    I performed a history and physical examination of the patient and discussed management with the resident. I reviewed the residents note and agree with the documented findings including all diagnostic interpretations and plan of care. Any areas of disagreement are noted on the chart. I was personally present for the key portions of any procedures. I have documented in the chart those procedures where I was not present during the key portions. I have reviewed the emergency nurses triage note. I agree with the chief complaint, past medical history, past surgical history, allergies, medications, social and family history as documented unless otherwise noted below. Documentation of the HPI, Physical Exam and Medical Decision Making performed by scribange is based on my personal performance of the HPI, PE and MDM. For Physician Assistant/ Nurse Practitioner cases/documentation I have personally evaluated this patient and have completed at least one if not all key elements of the E/M (history, physical exam, and MDM). Additional findings are as noted. This patient was evaluated in the Emergency Department for symptoms described in the history of present illness. He/she was evaluated in the context of the global COVID-19 pandemic, which necessitated consideration that the patient might be at risk for infection with the SARS-CoV-2 virus that causes COVID-19. Institutional protocols and algorithms that pertain to the evaluation of patients at risk for COVID-19 are in a state of rapid change based on information released by regulatory bodies including the CDC and federal and state organizations. These policies and algorithms were followed during the patient's care in the ED. Primary Care Physician: Gretchen Carrillo    History:  This is a 78 y.o. male who presents to the Emergency Department with complaint of shortness of breath and palpitations. Prior history of atrial fibrillation. Remotely had ablation. Not currently on anticoagulation. Physical:     height is 6' 3\" (1.905 m) and weight is 316 lb (143.3 kg) (abnormal). His oral temperature is 98.1 °F (36.7 °C). His blood pressure is 142/76 (abnormal) and his pulse is 100. His respiration is 19 and oxygen saturation is 93%.    78 y.o. male appears uncomfortable, cardiac exam irregularly irregular and significantly tachycardic, pulmonary clear bilaterally abdomen is soft nontender nondistended, calves nontender nonswollen no pretibial edema    Impression: A. fib with RVR    Plan: Cardizem, anticoagulation, labs, admit    EKG Interpretation  EKG Interpretation    Interpreted by emergency department physician    Rhythm: atrial fibrillation - rapid  Rate: 150-160  Axis: normal  Ectopy: none  Conduction: normal  ST Segments: no acute change  T Waves: no acute change  Q Waves: none    EKG  Impression: atrial fibrillation (new onset) with RVR    Sayda Robert MD      Interpreted by me      CRITICAL CARE: There was a high probability of clinically significant/life threatening deterioration in this patient's condition which required my urgent intervention. Total critical care time was 32 minutes. This excludes any time for separately reportable procedures.      Temi Barney MD, Amari Schwartz  Attending Emergency Physician         Sayda Robert MD  01/23/22 [id number]

## 2022-01-23 NOTE — ED PROVIDER NOTES
[address]  ED  Emergency Department Encounter  EmergencyMedicine Resident     Pt Maria T Jacinto  MRN: [id number]  Eligfjanel 1955  Date of evaluation: 1/23/22  PCP:  Brittany Rea       Chief Complaint   Patient presents with    Dizziness     feeling like passing out    Chest Pain       HISTORY OF PRESENT ILLNESS  (Location/Symptom, Timing/Onset, Context/Setting, Quality, Duration, Modifying Factors, Severity.)      Gracy Martínez is a 78 y.o. male who presents with pain, palpitations which started this morning. Patient reporting that symptoms began this morning. Has distant history of cardiac ablation for atrial fibrillation. Not currently anticoagulated. No associated shortness of breath or syncope but does report some lightheadedness. PAST MEDICAL / SURGICAL / SOCIAL / FAMILY HISTORY      has a past medical history of Adiposity, Atrial fibrillation and flutter ([address] Cancer ([facility] [address] Chronic kidney disease, Fatigue, Fear of flying, GERD (gastroesophageal reflux disease), Hyperlipidemia, Hypertension, Osteoarthritis of knee, Overactive bladder, Palpitations, and Sleep apnea. has a past surgical history that includes Kidney stone surgery (y-4); hernia repair; joint replacement; Cardioversion; Cardiac surgery; and ablation of dysrhythmic focus (8/13/15).       Social History     Socioeconomic History    Marital status:      Spouse name: Not on file    Number of children: Not on file    Years of education: Not on file    Highest education level: Not on file   Occupational History    Not on file   Tobacco Use    Smoking status: Former Smoker    Smokeless tobacco: Never Used   Substance and Sexual Activity    Alcohol use: No    Drug use: No    Sexual activity: Yes     Partners: Female   Other Topics Concern    Not on file   Social History Narrative    Not on file     Social Determinants of Health     Financial Resource Strain:     Difficulty of Paying Living Expenses: Not on file   Food Insecurity:     Worried About [address] [employer] in the Last Year: Not on file    Ran Out of Food in the Last Year: Not on file   Transportation Needs:     Lack of Transportation (Medical): Not on file    Lack of Transportation (Non-Medical): Not on file   Physical Activity:     Days of Exercise per Week: Not on file    Minutes of Exercise per Session: Not on file   Stress:     Feeling of Stress : Not on file   Social Connections:     Frequency of Communication with Friends and Family: Not on file    Frequency of Social Gatherings with Friends and Family: Not on file    Attends Judaism Services: Not on file    Active Member of [address] [place] [address] or Organizations: Not on file    Attends Club or Organization Meetings: Not on file    Marital Status: Not on file   Intimate Partner Violence:     Fear of Current or Ex-Partner: Not on file    Emotionally Abused: Not on file    Physically Abused: Not on file    Sexually Abused: Not on file   Housing Stability:     Unable to Pay for Housing in the Last Year: Not on file    Number of [place] in the Last Year: Not on file    Unstable Housing in the Last Year: Not on file       Family History   Problem Relation Age of Onset    Colon Cancer Mother     Heart Disease Father        Allergies:  Patient has no known allergies. Home Medications:  Prior to Admission medications    Medication Sig Start Date End Date Taking? Authorizing Provider   atorvastatin (LIPITOR) 20 MG tablet Take 20 mg by mouth 2/21/18   Historical Provider, MD   predniSONE (DELTASONE) 20 MG tablet TAKE 1 TABLET BY MOUTH THREE TIMES DAILY FOR 3 DAYS THEN 1,TABLET. ..  (REFER TO PRESCRIPTION NOTES).  4/17/17   Historical Provider, MD   ibuprofen (ADVIL;MOTRIN) 400 MG tablet Take 1 tablet by mouth 3 times daily (with meals) 8/16/15   Suellen Franklin MD   sotalol (BETAPACE) 80 MG tablet Take 1 tablet by mouth 2 times daily 8/14/15   Nita Felipe MD atorvastatin (LIPITOR) 20 MG tablet  3/4/15   Historical Provider, MD   pantoprazole (PROTONIX) 40 MG tablet   daily  4/16/15   Historical Provider, MD   VESICARE 5 MG tablet   daily  4/16/15   Historical Provider, MD   tamsulosin (FLOMAX) 0.4 MG capsule   daily  4/22/15   Historical Provider, MD       REVIEW OF SYSTEMS    (2-9 systems for level 4, 10 or more for level 5)      Review of Systems   Constitutional: Positive for fatigue. Negative for chills and fever. Respiratory: Negative for cough, shortness of breath and wheezing. Cardiovascular: Positive for chest pain and palpitations. Negative for leg swelling. Gastrointestinal: Negative for nausea and vomiting. Genitourinary: Negative for dysuria and flank pain. Skin: Negative for rash. Neurological: Positive for light-headedness. Negative for syncope and weakness. Psychiatric/Behavioral: Negative for confusion. PHYSICAL EXAM   (up to 7 for level 4, 8 or more for level 5)      INITIAL VITALS:   /71   Pulse 87   Temp 98.1 °F (36.7 °C) (Oral)   Resp 21   Ht 6' 3\" (1.905 m)   Wt (!) 316 lb (143.3 kg)   SpO2 93%   BMI 39.50 kg/m²     Physical Exam  Constitutional:       General: He is not in acute distress. Appearance: He is not toxic-appearing. HENT:      Head: Normocephalic and atraumatic. Cardiovascular:      Rate and Rhythm: Tachycardia present. Rhythm irregular. Heart sounds: No murmur heard. No friction rub. No gallop. Pulmonary:      Effort: No respiratory distress. Breath sounds: No wheezing, rhonchi or rales. Abdominal:      General: There is no distension. Tenderness: There is no abdominal tenderness. There is no guarding. Musculoskeletal:      Right lower leg: No edema. Left lower leg: No edema. Skin:     Findings: No rash. Neurological:      Mental Status: He is alert. Motor: No weakness.          DIFFERENTIAL  DIAGNOSIS     PLAN (LABS / IMAGING / EKG):  Orders Placed This Encounter   Procedures    XR CHEST PORTABLE    CBC Auto Differential    Basic Metabolic Panel w/ Reflex to MG    Troponin    Brain Natriuretic Peptide    CBC    APTT    APTT    CBC    APTT    ADULT DIET;  Regular    Diet NPO    Vital signs per unit routine    Notify physician    Notify physician    Up as tolerated    Telemetry monitoring - continuous duration    Full Code    Inpatient consult to Cardiology    Inpatient consult to Internal Medicine    EKG 12 Lead    PATIENT STATUS (FROM ED OR OR/PROCEDURAL) Inpatient       MEDICATIONS ORDERED:  Orders Placed This Encounter   Medications    dilTIAZem 25 MG/5ML injection     Soy MCDONALD: cabinet override    dilTIAZem injection 20 mg    FOLLOWED BY Linked Order Group     dilTIAZem injection 20 mg     dilTIAZem 125 mg in dextrose 5 % 125 mL infusion    0.9 % sodium chloride infusion    DISCONTD: heparin (porcine) injection 4,000 Units    DISCONTD: heparin (porcine) injection 4,000 Units    DISCONTD: heparin (porcine) injection 2,000 Units    DISCONTD: heparin 25,000 units in dextrose 5% 250 mL (premix) infusion    heparin (porcine) injection 10,000 Units    heparin (porcine) injection 10,000 Units    heparin (porcine) injection 5,000 Units    heparin 25,000 units in dextrose 5% 250 mL (premix) infusion    acetaminophen (TYLENOL) tablet 650 mg    OR Linked Order Group     ondansetron (ZOFRAN-ODT) disintegrating tablet 4 mg     ondansetron (ZOFRAN) injection 4 mg    0.9 % sodium chloride infusion       DDX: Atrial fibrillation, atrial flutter, multifocal atrial tachycardia, SVT    DIAGNOSTIC RESULTS / EMERGENCY DEPARTMENT COURSE / MDM   LAB RESULTS:  Results for orders placed or performed during the hospital encounter of 01/23/22   CBC Auto Differential   Result Value Ref Range    WBC 6.4 3.5 - 11.3 k/uL    RBC 5.69 4.21 - 5.77 m/uL    Hemoglobin 15.9 13.0 - 17.0 g/dL    Hematocrit 47.8 40.7 - 50.3 %    MCV 84.0 82.6 - 102.9 fL    MCH 27.9 25.2 - 33.5 pg    MCHC 33.3 28.4 - 34.8 g/dL    RDW 14.4 11.8 - 14.4 %    Platelets 665 041 - 449 k/uL    MPV 11.0 8.1 - 13.5 fL    NRBC Automated 0.0 0.0 per 100 WBC    Differential Type NOT REPORTED     Seg Neutrophils 60 36 - 65 %    Lymphocytes 25 24 - 43 %    Monocytes 9 3 - 12 %    Eosinophils % 4 1 - 4 %    Basophils 1 0 - 2 %    Immature Granulocytes 1 (H) 0 %    Segs Absolute 3.86 1.50 - 8.10 k/uL    Absolute Lymph # 1.57 1.10 - 3.70 k/uL    Absolute Mono # 0.59 0.10 - 1.20 k/uL    Absolute Eos # 0.22 0.00 - 0.44 k/uL    Basophils Absolute 0.08 0.00 - 0.20 k/uL    Absolute Immature Granulocyte 0.04 0.00 - 0.30 k/uL    WBC Morphology NOT REPORTED     RBC Morphology NOT REPORTED     Platelet Estimate NOT REPORTED    Basic Metabolic Panel w/ Reflex to MG   Result Value Ref Range    Glucose 94 70 - 99 mg/dL    BUN 15 8 - 23 mg/dL    CREATININE 1.20 0.70 - 1.20 mg/dL    Bun/Cre Ratio NOT REPORTED 9 - 20    Calcium 9.3 8.6 - 10.4 mg/dL    Sodium 141 135 - 144 mmol/L    Potassium 4.0 3.7 - 5.3 mmol/L    Chloride 107 98 - 107 mmol/L    CO2 23 20 - 31 mmol/L    Anion Gap 11 9 - 17 mmol/L    GFR Non-African American >60 >60 mL/min    GFR African American >60 >60 mL/min    GFR Comment          GFR Staging NOT REPORTED    Troponin   Result Value Ref Range    Troponin, High Sensitivity 11 0 - 22 ng/L    Troponin T NOT REPORTED <0.03 ng/mL    Troponin Interp NOT REPORTED    Troponin   Result Value Ref Range    Troponin, High Sensitivity 12 0 - 22 ng/L    Troponin T NOT REPORTED <0.03 ng/mL    Troponin Interp NOT REPORTED    Brain Natriuretic Peptide   Result Value Ref Range    Pro- <300 pg/mL    BNP Interpretation NOT REPORTED    APTT   Result Value Ref Range    PTT 26.1 20.5 - 30.5 sec       IMPRESSION: 69yo male with history of Afib presenting with palpitations, chest pain and lightheadedness. A-Fib w/ RVR on arrival, rate 160. BP stable and patient awake, alert and oriented. Given Cardizem bolus. Will get cardiac workup and plan for admission. RADIOLOGY:  XR CHEST PORTABLE    Result Date: 1/23/2022  EXAMINATION: ONE XRAY VIEW OF THE CHEST 1/23/2022 12:32 pm COMPARISON: 08/14/2015 HISTORY: ORDERING SYSTEM PROVIDED HISTORY: Chest pain TECHNOLOGIST PROVIDED HISTORY: Chest pain Reason for Exam: uprt port 43-year-old male with chest pain FINDINGS: Loop recorder device projects over the left upper hemithorax. Portable upright view of the chest. Cardiac monitor leads overlie the chest. Cardiac and mediastinal contours remain unchanged. Mild pulmonary vascular congestion. Stable mild cardiomegaly. No acute focal airspace consolidation or pleural effusions. Visualized osseous structures remain unchanged. Stable mild cardiomegaly. Mild pulmonary vascular congestion. EKG    Rhythm: atrial fibrillation - rapid  Rate: 150-160  Axis: normal  Ectopy: none  Conduction: normal  ST Segments: no acute change  T Waves: no acute change  Q Waves: none     EKG  Impression: atrial fibrillation with RVR    All EKG's are interpreted by the Emergency Department Physician who either signs or Co-signs this chart in the absence of a cardiologist.    EMERGENCY DEPARTMENT COURSE:  ED Course as of 01/23/22 1523   Sun Jan 23, 2022   1433 Heart rate improved to ninety-three after initial 20 mg Cardizem bolus. Patient with recurrence of A. fib with RVR heart rate one 30-1 fifty after approximately 15 minutes. Given additional 20 mg bolus of Cardizem and started on infusion. Cardiology consulted. Labs within normal limits. Plan for admission to internal medicine for A. fib with RVR. Heparin drip started [ZW]   9554 Admitted to internal medicine [ZW]      ED Course User Index  [ZW] Giuliana Sky,          PROCEDURES:  None    CONSULTS:  IP CONSULT TO CARDIOLOGY  IP CONSULT TO INTERNAL MEDICINE    CRITICAL CARE:  Please see attending note    FINAL IMPRESSION      1.  Atrial fibrillation with RVR ([address] DISPOSITION / PLAN     DISPOSITION Admitted 01/23/2022 02:29:37 PM      PATIENT REFERRED TO:  No follow-up provider specified.     DISCHARGE MEDICATIONS:  New Prescriptions    No medications on file       Smiley Frausto DO  Emergency Medicine Resident    (Please note that portions of thisnote were completed with a voice recognition program.  Efforts were made to edit the dictations but occasionally words are mis-transcribed.)        Smiley Frausto DO  Resident  01/23/22 1529

## 2022-01-23 NOTE — ED PROVIDER NOTES
[address] ED  Emergency Department  Emergency Medicine Resident Sign-out     Care of Trice Lee was assumed from Dr. Ariel Sullivan and is being seen for Dizziness (feeling like passing out) and Chest Pain  . The patient's initial evaluation and plan have been discussed with the prior provider who initially evaluated the patient.      EMERGENCY DEPARTMENT COURSE / MEDICAL DECISION MAKING:       MEDICATIONS GIVEN:  Orders Placed This Encounter   Medications    dilTIAZem 25 MG/5ML injection     [place]: cabinet override    dilTIAZem injection 20 mg    FOLLOWED BY Linked Order Group     dilTIAZem injection 20 mg     dilTIAZem 125 mg in dextrose 5 % 125 mL infusion    0.9 % sodium chloride infusion    DISCONTD: heparin (porcine) injection 4,000 Units    DISCONTD: heparin (porcine) injection 4,000 Units    DISCONTD: heparin (porcine) injection 2,000 Units    DISCONTD: heparin 25,000 units in dextrose 5% 250 mL (premix) infusion    heparin (porcine) injection 10,000 Units    heparin (porcine) injection 10,000 Units    heparin (porcine) injection 5,000 Units    heparin 25,000 units in dextrose 5% 250 mL (premix) infusion    acetaminophen (TYLENOL) tablet 650 mg    OR Linked Order Group     ondansetron (ZOFRAN-ODT) disintegrating tablet 4 mg     ondansetron (ZOFRAN) injection 4 mg    0.9 % sodium chloride infusion       LABS / RADIOLOGY:     Labs Reviewed   CBC WITH AUTO DIFFERENTIAL - Abnormal; Notable for the following components:       Result Value    Immature Granulocytes 1 (*)     All other components within normal limits   BASIC METABOLIC PANEL W/ REFLEX TO MG FOR LOW K   TROPONIN   TROPONIN   BRAIN NATRIURETIC PEPTIDE   APTT   APTT   APTT   APTT   APTT   APTT       XR CHEST PORTABLE    Result Date: 1/23/2022  EXAMINATION: ONE XRAY VIEW OF THE CHEST 1/23/2022 12:32 pm COMPARISON: 08/14/2015 HISTORY: ORDERING SYSTEM PROVIDED HISTORY: Chest pain TECHNOLOGIST PROVIDED HISTORY: Chest pain Reason for Exam: uprt port 14-year-old male with chest pain FINDINGS: Loop recorder device projects over the left upper hemithorax. Portable upright view of the chest. Cardiac monitor leads overlie the chest. Cardiac and mediastinal contours remain unchanged. Mild pulmonary vascular congestion. Stable mild cardiomegaly. No acute focal airspace consolidation or pleural effusions. Visualized osseous structures remain unchanged. Stable mild cardiomegaly. Mild pulmonary vascular congestion. RECENT VITALS:     Temp: 98.1 °F (36.7 °C),  Pulse: 87, Resp: 21, BP: 112/71, SpO2: 93 %    This patient is a 78 y.o. Male with atrial fibrillation with RVR, on Cardene, admitted to [address] [place] [address] / RECOMMENDATIONS:    1. Awaiting bed     FINAL IMPRESSION:     1. Atrial fibrillation with RVR ([facility] [address]        DISPOSITION:         DISPOSITION:  []  Discharge   []  Transfer -    [x]  Admission -  medicine   []  Against Medical Advice   []  Eloped   FOLLOW-UP: No follow-up provider specified.    DISCHARGE MEDICATIONS: New Prescriptions    No medications on file           Hema Little DO  Emergency Medicine Resident  [facility]     Hema Little, [address] [place] [address]  01/23/22 8147

## 2022-01-24 LAB
ABSOLUTE EOS #: 0.24 K/UL (ref 0–0.44)
ABSOLUTE IMMATURE GRANULOCYTE: 0.04 K/UL (ref 0–0.3)
ABSOLUTE LYMPH #: 2.09 K/UL (ref 1.1–3.7)
ABSOLUTE MONO #: 0.59 K/UL (ref 0.1–1.2)
ANION GAP SERPL CALCULATED.3IONS-SCNC: 13 MMOL/L (ref 9–17)
BASOPHILS # BLD: 1 % (ref 0–2)
BASOPHILS ABSOLUTE: 0.07 K/UL (ref 0–0.2)
BUN BLDV-MCNC: 18 MG/DL (ref 8–23)
BUN/CREAT BLD: NORMAL (ref 9–20)
CALCIUM SERPL-MCNC: 9 MG/DL (ref 8.6–10.4)
CHLORIDE BLD-SCNC: 106 MMOL/L (ref 98–107)
CO2: 22 MMOL/L (ref 20–31)
CREAT SERPL-MCNC: 1.06 MG/DL (ref 0.7–1.2)
DIFFERENTIAL TYPE: ABNORMAL
EKG ATRIAL RATE: 129 BPM
EKG Q-T INTERVAL: 296 MS
EKG QRS DURATION: 90 MS
EKG QTC CALCULATION (BAZETT): 482 MS
EKG R AXIS: 12 DEGREES
EKG T AXIS: 25 DEGREES
EKG VENTRICULAR RATE: 160 BPM
EOSINOPHILS RELATIVE PERCENT: 3 % (ref 1–4)
GFR AFRICAN AMERICAN: >60 ML/MIN
GFR NON-AFRICAN AMERICAN: >60 ML/MIN
GFR SERPL CREATININE-BSD FRML MDRD: NORMAL ML/MIN/{1.73_M2}
GFR SERPL CREATININE-BSD FRML MDRD: NORMAL ML/MIN/{1.73_M2}
GLUCOSE BLD-MCNC: 99 MG/DL (ref 70–99)
HCT VFR BLD CALC: 47.3 % (ref 40.7–50.3)
HEMOGLOBIN: 15.8 G/DL (ref 13–17)
IMMATURE GRANULOCYTES: 1 %
LV EF: 55 %
LVEF MODALITY: NORMAL
LYMPHOCYTES # BLD: 28 % (ref 24–43)
MCH RBC QN AUTO: 28.3 PG (ref 25.2–33.5)
MCHC RBC AUTO-ENTMCNC: 33.4 G/DL (ref 28.4–34.8)
MCV RBC AUTO: 84.8 FL (ref 82.6–102.9)
MONOCYTES # BLD: 8 % (ref 3–12)
NRBC AUTOMATED: 0 PER 100 WBC
PARTIAL THROMBOPLASTIN TIME: 39.7 SEC (ref 20.5–30.5)
PARTIAL THROMBOPLASTIN TIME: 64.9 SEC (ref 20.5–30.5)
PARTIAL THROMBOPLASTIN TIME: 88 SEC (ref 20.5–30.5)
PARTIAL THROMBOPLASTIN TIME: >120 SEC (ref 20.5–30.5)
PDW BLD-RTO: 14.5 % (ref 11.8–14.4)
PLATELET # BLD: 167 K/UL (ref 138–453)
PLATELET ESTIMATE: ABNORMAL
PMV BLD AUTO: 10.8 FL (ref 8.1–13.5)
POTASSIUM SERPL-SCNC: 4.2 MMOL/L (ref 3.7–5.3)
RBC # BLD: 5.58 M/UL (ref 4.21–5.77)
RBC # BLD: ABNORMAL 10*6/UL
SEG NEUTROPHILS: 59 % (ref 36–65)
SEGMENTED NEUTROPHILS ABSOLUTE COUNT: 4.46 K/UL (ref 1.5–8.1)
SODIUM BLD-SCNC: 141 MMOL/L (ref 135–144)
WBC # BLD: 7.5 K/UL (ref 3.5–11.3)
WBC # BLD: ABNORMAL 10*3/UL

## 2022-01-24 PROCEDURE — 97535 SELF CARE MNGMENT TRAINING: CPT

## 2022-01-24 PROCEDURE — 2500000003 HC RX 250 WO HCPCS: Performed by: STUDENT IN AN ORGANIZED HEALTH CARE EDUCATION/TRAINING PROGRAM

## 2022-01-24 PROCEDURE — 6360000002 HC RX W HCPCS

## 2022-01-24 PROCEDURE — 93010 ELECTROCARDIOGRAM REPORT: CPT | Performed by: INTERNAL MEDICINE

## 2022-01-24 PROCEDURE — 2580000003 HC RX 258: Performed by: STUDENT IN AN ORGANIZED HEALTH CARE EDUCATION/TRAINING PROGRAM

## 2022-01-24 PROCEDURE — 6360000002 HC RX W HCPCS: Performed by: STUDENT IN AN ORGANIZED HEALTH CARE EDUCATION/TRAINING PROGRAM

## 2022-01-24 PROCEDURE — 6370000000 HC RX 637 (ALT 250 FOR IP): Performed by: STUDENT IN AN ORGANIZED HEALTH CARE EDUCATION/TRAINING PROGRAM

## 2022-01-24 PROCEDURE — 85730 THROMBOPLASTIN TIME PARTIAL: CPT

## 2022-01-24 PROCEDURE — 6360000002 HC RX W HCPCS: Performed by: INTERNAL MEDICINE

## 2022-01-24 PROCEDURE — 94660 CPAP INITIATION&MGMT: CPT

## 2022-01-24 PROCEDURE — 36415 COLL VENOUS BLD VENIPUNCTURE: CPT

## 2022-01-24 PROCEDURE — 80048 BASIC METABOLIC PNL TOTAL CA: CPT

## 2022-01-24 PROCEDURE — 85025 COMPLETE CBC W/AUTO DIFF WBC: CPT

## 2022-01-24 PROCEDURE — 93306 TTE W/DOPPLER COMPLETE: CPT

## 2022-01-24 PROCEDURE — 2060000000 HC ICU INTERMEDIATE R&B

## 2022-01-24 PROCEDURE — 97165 OT EVAL LOW COMPLEX 30 MIN: CPT

## 2022-01-24 RX ORDER — FENTANYL CITRATE 50 UG/ML
50 INJECTION, SOLUTION INTRAMUSCULAR; INTRAVENOUS ONCE
Status: COMPLETED | OUTPATIENT
Start: 2022-01-24 | End: 2022-01-24

## 2022-01-24 RX ORDER — MIDAZOLAM HYDROCHLORIDE 2 MG/2ML
2 INJECTION, SOLUTION INTRAMUSCULAR; INTRAVENOUS ONCE
Status: COMPLETED | OUTPATIENT
Start: 2022-01-24 | End: 2022-01-24

## 2022-01-24 RX ORDER — DIGOXIN 0.25 MG/ML
250 INJECTION INTRAMUSCULAR; INTRAVENOUS ONCE
Status: COMPLETED | OUTPATIENT
Start: 2022-01-24 | End: 2022-01-24

## 2022-01-24 RX ADMIN — PANTOPRAZOLE SODIUM 40 MG: 40 TABLET, DELAYED RELEASE ORAL at 07:26

## 2022-01-24 RX ADMIN — DILTIAZEM HYDROCHLORIDE 10 MG/HR: 5 INJECTION INTRAVENOUS at 02:31

## 2022-01-24 RX ADMIN — HEPARIN SODIUM 5000 UNITS: 1000 INJECTION INTRAVENOUS; SUBCUTANEOUS at 15:42

## 2022-01-24 RX ADMIN — MIDAZOLAM 2 MG: 1 INJECTION INTRAMUSCULAR; INTRAVENOUS at 10:45

## 2022-01-24 RX ADMIN — SODIUM CHLORIDE, PRESERVATIVE FREE 10 ML: 5 INJECTION INTRAVENOUS at 20:45

## 2022-01-24 RX ADMIN — HEPARIN SODIUM AND DEXTROSE 18 UNITS/KG/HR: 10000; 5 INJECTION INTRAVENOUS at 03:07

## 2022-01-24 RX ADMIN — MIDAZOLAM 2 MG: 1 INJECTION INTRAMUSCULAR; INTRAVENOUS at 10:26

## 2022-01-24 RX ADMIN — DIGOXIN 250 MCG: 250 INJECTION, SOLUTION INTRAMUSCULAR; INTRAVENOUS; PARENTERAL at 09:48

## 2022-01-24 RX ADMIN — FENTANYL CITRATE 50 MCG: 50 INJECTION, SOLUTION INTRAMUSCULAR; INTRAVENOUS at 10:26

## 2022-01-24 RX ADMIN — HEPARIN SODIUM AND DEXTROSE 16 UNITS/KG/HR: 10000; 5 INJECTION INTRAVENOUS at 18:20

## 2022-01-24 RX ADMIN — METOPROLOL TARTRATE 5 MG: 5 INJECTION INTRAVENOUS at 09:33

## 2022-01-24 ASSESSMENT — PAIN SCALES - GENERAL: PAINLEVEL_OUTOF10: 0

## 2022-01-24 NOTE — PLAN OF CARE
NONINVASIVE VENTILATION    PROVIDE OPTIMAL VENTILATION/ACCEPTABLE SPO2   IMPLEMENT NONINVASIVE VENTILATION PROTOCOL   MAINTAIN ACCEPTABLE SPO2   ASSESS SKIN INTEGRITY/BREAKDOWN SCORE   PATIENT EDUCATION AS NEEDED   BIPAP AS NEEDED       V60 at bedside.  Patient no ready to wear yet

## 2022-01-24 NOTE — PROGRESS NOTES
Occupational Therapy   Occupational Therapy Initial Assessment  Date: 2022   Patient Name: Willy Gaines  MRN: [id number]     : 1955    Date of Service: 2022  Chief Complaint   Patient presents with    Dizziness     feeling like passing out    Chest Pain       Discharge Recommendations:No therapy recommended at discharge. Defer OT at this time  OT Equipment Recommendations  Equipment Needed: No    Assessment   Prognosis: Good  Decision Making: Low Complexity  OT Education: OT Role;Plan of Care;Precautions; Family Education  Patient Education: Good return from pt and wife  No Skilled OT: Independent with functional mobility; Independent with ADL's;Safe to return home; At baseline function  REQUIRES OT FOLLOW UP: No  Activity Tolerance  Activity Tolerance: Patient Tolerated treatment well  Safety Devices  Safety Devices in place: Yes  Type of devices: Patient at risk for falls; Left in bed;Nurse notified;Call light within reach  Restraints  Initially in place: No           Patient Diagnosis(es): The encounter diagnosis was Atrial fibrillation with RVR ([facility] [address] has a past medical history of Adiposity, Atrial fibrillation and flutter ([facility] [address] Cancer ([facility] [address] Chronic kidney disease, Fatigue, Fear of flying, GERD (gastroesophageal reflux disease), Hyperlipidemia, Hypertension, Osteoarthritis of knee, Overactive bladder, Palpitations, and Sleep apnea. has a past surgical history that includes Kidney stone surgery (y-4); hernia repair; joint replacement; Cardioversion; Cardiac surgery; and ablation of dysrhythmic focus (8/13/15).          Restrictions  Restrictions/Precautions  Restrictions/Precautions: General Precautions,Fall Risk,Up as Tolerated  Required Braces or Orthoses?: No  Position Activity Restriction  Other position/activity restrictions: up with A    Subjective   General  Patient assessed for rehabilitation services?: Yes  Family / Caregiver Present: No  Diagnosis: dizziness, recent covid, A-fib  Patient Currently in Pain: No  Pain Assessment  Pain Assessment: 0-10  Pain Level: 0  Vital Signs  Patient Currently in Pain: No  Social/Functional History  Social/Functional History  Lives With: Spouse  Type of Home: House  Home Layout: One level,Laundry in basement,Work area in basement  Home Access: Stairs to enter with rails  Entrance Stairs - Number of Steps: 3  Entrance Stairs - Rails: Both  Bathroom Shower/Tub: Tub/Shower unit  Bathroom Toilet: Handicap height  Bathroom Equipment: Grab bars in shower,Shower chair  Bathroom Accessibility: Accessible  Home Equipment: Rolling walker,Cane (not using)  ADL Assistance: [address]: Independent  Homemaking Responsibilities: Yes  Ambulation Assistance: Independent  Transfer Assistance: Independent  Active : Yes  Mode of Transportation: Truck  Occupation: Unemployed  Type of occupation: Laid off-[employer]  [address]: watch football, cleaning  Additional Comments: Wife works       Objective   Vision: Within Functional Limits  Hearing: Within functional limits    Orientation  Overall Orientation Status: Within Functional Limits     Balance  Sitting Balance: Independent  Standing Balance: Independent  Standing Balance  Time: 7 min  Activity: Pt stood bedside, at toilet, func mob around room  Functional Mobility  Functional - Mobility Device: No device  Activity: To/from bathroom  Assist Level:  Independent  Toilet Transfers  Toilet - Technique: Ambulating  Equipment Used: Standard toilet  Toilet Transfer: Modified independent  ADL  Feeding: Independent  Grooming: Independent  UE Bathing: Independent  LE Bathing: Independent  UE Dressing: Independent  LE Dressing: Independent  Toileting: Independent  Additional Comments: Pt doffed/donned sock sitting on EOB, sat on toilet to change pad in underwear which is baseline task, good BUE strength noted, pt at baseline  Tone RUE  RUE Tone: Normotonic  Tone LUE  LUE Tone: Normotonic  Coordination  Movements Are Fluid And Coordinated: Yes     Bed mobility  Supine to Sit: Independent  Sit to Supine: Unable to assess  Scooting: Independent  Comment: Pt supine in bed upon arrival, pt retired sitting EOB upon exit with wife present  Transfers  Sit to stand: Independent  Stand to sit:  Independent     Cognition  Overall Cognitive Status: WFL        Sensation  Overall Sensation Status: WFL        LUE AROM (degrees)  LUE AROM : WFL  RUE AROM (degrees)  RUE AROM : WFL  LUE Strength  Gross LUE Strength: WFL  L Hand General: 5/5  RUE Strength  Gross RUE Strength: WFL  R Hand General: 5/5                             AM-PAC Score        AM-[facility] Inpatient Daily Activity Raw Score: 24 (01/24/22 1757)  AM-PAC Inpatient ADL T-Scale Score : 57.54 (01/24/22 1757)  ADL Inpatient CMS 0-100% Score: 0 (01/24/22 1757)  ADL Inpatient CMS G-Code Modifier : CH (01/24/22 1757)    Goals  Short term goals  Time Frame for Short term goals: OT eval and d/c d/t (I)     Therapy Time   Individual Concurrent Group Co-treatment   Time In 1519         Time Out 1535         Minutes 16         Timed Code Treatment Minutes: 12 Minutes       ESA Bruce/L

## 2022-01-24 NOTE — CONSULTS
[facility]   Consult Note         Today's Date: 1/24/2022  Patient Name: Mackenzie Arellano  Date of admission: 1/23/2022 12:14 PM  Patient's age: 78 y.o., 1955  Admission Dx: Atrial fibrillation with RVR ([facility] [address] [I48.91]    Reason for Consult:  Cardiac evaluation    Requesting Physician: Bailey Jensen MD    REASON FOR CONSULT: Atrial fibrillation with RVR    History Obtained From:  Patient, chart, staff, records    HISTORY OF PRESENT ILLNESS:      The patient is a 78 y.o. male who is admitted to the hospital for palpitations with associated dizziness which started this morning. Patient states the dizziness is worse upon standing. Patient denies any falls or loss of consciousness. Patient has a distant history of atrial fibrillation in which she had ablation 5 years ago with Dr. Melissa Alexandra. Since the ablation patient denies any symptoms of palpitations until yesterday morning which prompted him to come to the emergency department. Patient states that he was diagnosed with COVID infection 3 weeks ago. Patient states he has not been on any anticoagulation since the ablation. Prior to the ablation patient took Coumadin. Patient states that he takes metoprolol at home but there is documentation of sotalol. Currently, patient has no complaints. Past Medical History:   has a past medical history of Adiposity, Atrial fibrillation and flutter ([address] Cancer ([facility] [address] Chronic kidney disease, Fatigue, Fear of flying, GERD (gastroesophageal reflux disease), Hyperlipidemia, Hypertension, Osteoarthritis of knee, Overactive bladder, Palpitations, and Sleep apnea. Past Surgical History:   has a past surgical history that includes Kidney stone surgery (y-4); hernia repair; joint replacement; Cardioversion; Cardiac surgery; and ablation of dysrhythmic focus (8/13/15). Home Medications:    Prior to Admission medications    Medication Sig Start Date End Date Taking?  Authorizing Provider atorvastatin (LIPITOR) 20 MG tablet Take 20 mg by mouth 2/21/18   Historical Provider, MD   predniSONE (DELTASONE) 20 MG tablet TAKE 1 TABLET BY MOUTH THREE TIMES DAILY FOR 3 DAYS THEN 1,TABLET. ..  (REFER TO PRESCRIPTION NOTES). 4/17/17   Historical Provider, MD   ibuprofen (ADVIL;MOTRIN) 400 MG tablet Take 1 tablet by mouth 3 times daily (with meals) 8/16/15   Joe Quigley MD   sotalol (BETAPACE) 80 MG tablet Take 1 tablet by mouth 2 times daily 8/14/15   Aleah Hammer MD   atorvastatin (LIPITOR) 20 MG tablet  3/4/15   Historical Provider, MD   pantoprazole (PROTONIX) 40 MG tablet   daily  4/16/15   Historical Provider, MD   VESICARE 5 MG tablet   daily  4/16/15   Historical Provider, MD   tamsulosin [facility]) 0.4 MG capsule   daily  4/22/15   Historical Provider, MD     pantoprazole, 40 mg, Oral, QAM AC    sodium chloride flush, 5-40 mL, IntraVENous, 2 times per day    betamethasone acetate-betamethasone sodium phosphate, 3 mg, Intra-LESional, Once      Allergies:  Patient has no known allergies. Social History:   reports that he has quit smoking. He has never used smokeless tobacco. He reports that he does not drink alcohol and does not use drugs. Family History: family history includes Colon Cancer in his mother; Heart Disease in his father. No h/o sudden cardiac death. REVIEW OF SYSTEMS:    Constitutional: there has been no unanticipated weight loss. There's been No change in energy level, No change in activity level. Eyes: No visual changes or diplopia. No scleral icterus. ENT: No Headaches, hearing loss or vertigo. No mouth sores or sore throat. Cardiovascular: per HPI  Respiratory: per HPI  Gastrointestinal: No abdominal pain, appetite loss, blood in stools. No change in bowel or bladder habits. Genitourinary: No dysuria, trouble voiding, or hematuria. Musculoskeletal:  No gait disturbance, No weakness or joint complaints. Integumentary: No rash or pruritis.   Neurological: No headache, diplopia, change in muscle strength, numbness or tingling. No change in gait, balance, coordination, mood, affect, memory, mentation, behavior. Psychiatric: No anxiety, or depression. Endocrine: No temperature intolerance. No excessive thirst, fluid intake, or urination. No tremor. Hematologic/Lymphatic: No abnormal bruising or bleeding, blood clots or swollen lymph nodes. Allergic/Immunologic: No nasal congestion or hives. PHYSICAL EXAM:      /85   Pulse 125   Temp 98.1 °F (36.7 °C) (Oral)   Resp 24   Ht 6' 3\" (1.905 m)   Wt (!) 316 lb (143.3 kg)   SpO2 94%   BMI 39.50 kg/m²    Constitutional and General Appearance: alert, cooperative, no distress and appears stated age  HEENT: PERRL, no cervical lymphadenopathy. No masses palpable. Normal oral mucosa  Respiratory:  Normal excursion and expansion without use of accessory muscles  Resp Auscultation: Good respiratory effort. No for increased work of breathing. On auscultation: clear to auscultation bilaterally  Cardiovascular: The apical impulse is not displaced  Heart tones are crisp and normal. regular S1 and S2.  Jugular venous pulsation Normal  The carotid upstroke is normal in amplitude and contour without delay or bruit  Peripheral pulses are symmetrical and full   Abdomen:   No masses or tenderness  Bowel sounds present  Extremities:   No Cyanosis or Clubbing   Lower extremity edema: No   Skin: Warm and dry  Neurological:  Alert and oriented. Moves all extremities well  No abnormalities of mood, affect, memory, mentation, or behavior are noted        EKG:    Date: 01/24/22  Reading: No acute ischemia      LAST ECHO: 08/14/2015  Left ventricle is normal in size. Systolic function is normal.  Estimated ejection fraction is 55-60%. No significant valvular regurgitation or stenosis seen. No significant pericardial effusion is seen. LAST Stress Test:   None documented    LAST Cardiac Angiography:.   Ablation for atrial fibrillation on 8/13/2015      Labs:     CBC:   Recent Labs     01/23/22  1222   WBC 6.4   HGB 15.9   HCT 47.8        BMP:   Recent Labs     01/23/22  1222      K 4.0   CO2 23   BUN 15   CREATININE 1.20   LABGLOM >60   GLUCOSE 94     BNP: No results for input(s): BNP in the last 72 hours. PT/INR: No results for input(s): PROTIME, INR in the last 72 hours. APTT:  Recent Labs     01/23/22  2242 01/24/22  0113   APTT 64.2* 64.9*     CARDIAC ENZYMES:No results for input(s): CKTOTAL, CKMB, CKMBINDEX, TROPONINI in the last 72 hours. FASTING LIPID PANEL:No results found for: HDL, LDLDIRECT, LDLCALC, TRIG  LIVER PROFILE:No results for input(s): AST, ALT, LABALBU in the last 72 hours.   Troponins: Invalid input(s): TROPONIN     Other Current Problems  Patient Active Problem List   Diagnosis    Chest pain    Paroxysmal atrial fibrillation (HCC)    Acute vasomotor rhinitis    Adenocarcinoma of prostate ([facility] [address]    Adiposity    Cardiomyopathy ([facility] [address]    Dermatitis    Eczema    Erectile dysfunction following radical prostatectomy    Fear of flying    GERD (gastroesophageal reflux disease)    Herpes simplex type 1 infection    Hyperlipidemia    Hypertension    Incisional hernia, without obstruction or gangrene    Intestinal volvulus (HCC)    Obstructive sleep apnea syndrome    Osteoarthritis of knee    Overactive bladder    Strain of lumbar region    Ventral incisional hernia    Viral upper respiratory tract infection    Atrial fibrillation with RVR (HCC)    S/P prostatectomy           Impression    Atrial fibrillation with RVR and associated dizziness upon standing  Hypertension  Hyperlipidemia  Obstructive sleep apnea    Recommendations:   Cardioversion as it is a new onset a-fib within 48 hours   Will start Eliquis as pt was cardioverted and continue with it for 4 weeks   Echocardiogram  Continue with Lopressor   Continue with Lipitor  Continue with Heparin   Recommend continuing BiPAP during daytime naps and at night    Discussed with patient, family, and Nurse. Electronically signed by Torsten Ramírez MD on 1/24/2022 at 7:43 AM      Torsten Ramírez MD  [facility]; [place], [address] [place] [address] reviewed the patient history personally, and examined by me during the visit. I have reviewed the H&P/Consult / Progress note as completed, and made appropriate changes to the patient exam and treatment plans.       I have reviewed the case in details including physical exam and treatment plan with resident / fellow / NP    Patient treatment plan was explained to patient, correction in notes was made as appropriate, and discussed final arrangement based on  my evaluation and exam.    Additional Recommendations:      Lucero Marcano MD  [place] [facility]

## 2022-01-24 NOTE — ED NOTES
The following labs labeled with pt sticker and tubed to lab:     [x] Blue     [] Lavender   [] on ice  [] Green/yellow  [] Green/black [] on ice  [] Yellow  [] Red  [] Pink      [] COVID-19 swab    [] Rapid  [] PCR  [] Flu swab  [] Peds Viral Panel     [] Urine Sample  [] Pelvic Cultures  [] Blood Cultures            Angela Byrne RN  01/23/22 1951

## 2022-01-24 NOTE — ED NOTES
The following labs labeled with pt sticker and tubed to lab:     [x] Blue     [] Lavender   [] on ice  [] Green/yellow  [] Green/black [] on ice  [] Yellow  [] Red  [] Pink      [] COVID-19 swab    [] Rapid  [] PCR  [] Flu swab  [] Peds Viral Panel     [] Urine Sample  [] Pelvic Cultures  [] Blood Cultures            Kervin Singh RN  01/23/22 3804

## 2022-01-24 NOTE — ED NOTES
The following labs labeled with pt sticker and tubed to lab:     [x] Blue     [] Lavender   [] on ice  [] Green/yellow  [] Green/black [] on ice  [] Yellow  [] Red  [] Pink      [] COVID-19 swab    [] Rapid  [] PCR  [] Flu swab  [] Peds Viral Panel     [] Urine Sample  [] Pelvic Cultures  [] Blood Cultures            Elsa Moses RN  01/24/22 7866

## 2022-01-24 NOTE — CARE COORDINATION
Case Management Initial Discharge Plan  Jarad Dominguez,             Met with:patient's wife to discuss discharge plans, pt asleep. Information verified: address, contacts, phone number, , insurance Yes  Insurance Provider: [employer]    Emergency Contact/Next of Kin name & number: Taurus Salinas [phone]  Who are involved in patient's support system? wife    PCP: Rd Del Castillo  Date of last visit: few months ago      Discharge Planning    Living Arrangements: Pt lives with wife       Home has1 stories  3 stairs to climb to get into front door, 0 stairs to climb to reach second floor  Location of bedroom/bathroom in home  - main floor    Patient able to perform ADL's:Independent    Current Services (outpatient & in home) none  DME equipment: CPAP  DME provider:     Is patient receiving oral anticoagulation therapy? No    If indicated:   Physician managing anticoagulation treatment:   Where does patient obtain lab work for ATC treatment? Potential Assistance Needed: none      Patient agreeable to home care: No  [place] of choice provided:  n/a    Prior SNF/Rehab Placement and Facility: no  Agreeable to SNF/Rehab: No  [place] of choice provided: n/a     Evaluation: no    Expected Discharge date:       Patient expects to be discharged to: home      If home: is the family and/or caregiver wiling & able to provide support at home? yes  Who will be providing this support? wife  Follow Up Appointment: Best Day/ Time:      Transportation provider: selin norwood  Transportation arrangements needed for discharge: No, wife will transport home    Readmission Risk              Risk of Unplanned Readmission:  9             Does patient have a readmission risk score greater than 14?: No  If yes, follow-up appointment must be made within 7 days of discharge.      Goals of Care:       Educated wife on transitional options, provided freedom of choice and are agreeable with plan      Discharge Plan: Pt will return home with wife          Electronically signed by Sigrid Ochoa on 1/24/22 at 10:35 AM EST

## 2022-01-24 NOTE — PROGRESS NOTES
[facility]  [facility] Medicine Teaching Residency Program  Inpatient Daily Progress Note  ______________________________________________________________________________    Patient: Tara Nielson  YOB: 1955   YBO:[id number]    Acct: [de-identified]     Room: 24/24  Admit date: 1/23/2022  Today's date: 01/24/22  Number of days in the hospital: 1    SUBJECTIVE   CC: Dizziness (feeling like passing out) and Chest Pain    Pt examined at bedside. Chart & results reviewed. - VSS, pt is saturating well on RA  - labs reviewed   - no acute events overnight.   - Patient denies headache, vision problems, nausea, vomiting, chest pain, cough, abdominal pain, changes in bowel or urinary habits, and swelling. ROS:  General ROS: Completed and except as mentioned above were negative   HEENT ROS: Completed and except as mentioned above were negative   Allergy and Immunology ROS:  Completed and except as mentioned above were negative  Hematological and Lymphatic ROS:  Completed and except as mentioned above were negative  Respiratory ROS:  Completed and except as mentioned above were negative  Cardiovascular ROS:  Completed and except as mentioned above were negative  Gastrointestinal ROS: Completed and except as mentioned above were negative  Genito-Urinary ROS:  Completed and except as mentioned above were negative  Musculoskeletal ROS:  Completed and except as mentioned above were negative  Neurological ROS:  Completed and except as mentioned above were negative  Skin & Dermatological ROS:  Completed and except as mentioned above were negative  Psychological ROS:  Completed and except as mentioned above were negative  BRIEF HISTORY     The patient is a 78 y.o. male who presented after complaining of palpitations which started this morning. Patient has a past medical history of atrial fibrillation status post ablation 6 to 7 years ago done by Dr. Amparo Castillo.   According to [facility]  [address] [facility] [address]  Phone: [phone]  Fax: [phone]    Anni Walters MD        October 13, 2021     Patient: Hope sarah   YOB: 1966   Date of Visit: 10/13/2021       To Whom it May Concern:    Jane sarah was seen in my clinic on 10/13/2021 for a medical condition. Please excuse patient from work on 10/8/21 - 10/14/21. Stalin's Covid test was negative but is still not feeling well. She may return to work on 10/15/21. If you have any questions or concerns, please don't hesitate to call.     Sincerely,           Anni Walters MD the patient patient had palpitation associated with lightheadedness and dizziness. Patient denies any chest pain. Patient does complain of mild shortness of breath, no oxygen use at home. Patient is not on anticoagulation at home.     On arrival to the ED, patient patient was tachycardic, BP on the higher side other vital signs stable patient was in atrial fibrillation rhythm. Patient was started on heparin and Cardizem drip. Pertinent labs were ordered which were unremarkable. Troponins and proBNP within normal limits.     When I went to evaluate the patient, the patient was resting comfortably on the bed. According to the patient, he follows up with a cardiologist in [address]. Pt had COVID 3 weeks ago.      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     Social:  -Tobacco: quit yrs ago   -Alcohol: neg   -Illicit Drug Use: neg    OBJECTIVE     Vital Signs:  /81   Pulse 111   Temp 98.1 °F (36.7 °C) (Oral)   Resp 24   Ht 6' 3\" (1.905 m)   Wt (!) 316 lb (143.3 kg)   SpO2 92%   BMI 39.50 kg/m²     Temp (24hrs), Av.1 °F (36.7 °C), Min:98.1 °F (36.7 °C), Max:98.1 °F (36.7 °C)    No intake/output data recorded. Physical Exam:  Physical Exam  Vitals reviewed. Constitutional:       General: He is awake. Appearance: Normal appearance. He is obese. HENT:      Head: Normocephalic. Eyes:      General: Lids are normal.      Pupils: Pupils are equal, round, and reactive to light. Cardiovascular:      Rate and Rhythm: Tachycardia present. Rhythm regularly irregular. Pulses: Normal pulses. Heart sounds: Normal heart sounds. Pulmonary:      Effort: Pulmonary effort is normal.      Breath sounds: Normal breath sounds and air entry. Musculoskeletal:      Right lower leg: No swelling. Left lower leg: No swelling. Skin:     General: Skin is warm.       Capillary Refill: Capillary refill takes less than 2 seconds. Neurological:      Mental Status: He is alert and oriented to person, place, and time. Psychiatric:         Attention and Perception: Attention normal.         Mood and Affect: Mood normal.         Speech: Speech normal.         Behavior: Behavior is cooperative. Medications:  Scheduled Medications:    pantoprazole  40 mg Oral QAM AC    sodium chloride flush  5-40 mL IntraVENous 2 times per day    betamethasone acetate-betamethasone sodium phosphate  3 mg Intra-LESional Once     Continuous Infusions:    dilTIAZem 10 mg/hr (01/23/22 1840)    heparin (PORCINE) Infusion 18 Units/kg/hr (01/23/22 2325)    sodium chloride       PRN Medicationsheparin (porcine), 10,000 Units, PRN  heparin (porcine), 5,000 Units, PRN  metoprolol, 5 mg, Q6H PRN  sodium chloride flush, 5-40 mL, PRN  sodium chloride, 25 mL, PRN  ondansetron, 4 mg, Q8H PRN   Or  ondansetron, 4 mg, Q6H PRN  polyethylene glycol, 17 g, Daily PRN  acetaminophen, 650 mg, Q6H PRN   Or  acetaminophen, 650 mg, Q6H PRN  potassium chloride, 40 mEq, PRN   Or  potassium alternative oral replacement, 40 mEq, PRN   Or  potassium chloride, 10 mEq, PRN        Diagnostic Labs:  CBC:   Recent Labs     01/23/22  1222   WBC 6.4   RBC 5.69   HGB 15.9   HCT 47.8   MCV 84.0   RDW 14.4        BMP:   Recent Labs     01/23/22  1222      K 4.0      CO2 23   BUN 15   CREATININE 1.20     BNP: No results for input(s): BNP in the last 72 hours. PT/INR: No results for input(s): PROTIME, INR in the last 72 hours. APTT:   Recent Labs     01/23/22  1222 01/23/22  2030 01/23/22  2242   APTT 26.1 >120.0* 64.2*     CARDIAC ENZYMES: No results for input(s): CKMB, CKMBINDEX, TROPONINI in the last 72 hours. Invalid input(s): CKTOTAL;3  FASTING LIPID PANEL:No results found for: CHOL, HDL, TRIG  LIVER PROFILE: No results for input(s): AST, ALT, ALB, BILIDIR, BILITOT, ALKPHOS in the last 72 hours.    MICROBIOLOGY: No results found for: CULTURE    Imaging:    XR CHEST PORTABLE    Result Date: 1/23/2022  Stable mild cardiomegaly. Mild pulmonary vascular congestion. ASSESSMENT & PLAN     IMPRESSION  This is a 78 y.o. male who presented with palpitations and found to have atrial fibrillation with RVR. Patient admitted to inpatient status for the management of Atrial fibrillation with RVR.         Paroxysmal atrial fibrillation with RVR s/p ablation 6 to 7 years ago by  CHADVASc= 2  -Continue heparin drip  -Continue Cardizem drip  -Monitor HR  -Cardiology consulted     Obstructive Sleep Apnea  -BiPAP overnight     DVT ppx: Heparin  GI ppx: Protonix  Diet: Adult Regular Diet  PT/OT: Consulted  Case Management: Consulted    Disposition: Monitor inpatient. Awaiting cardio recommendations      Claudia Boswell MD  PGY-1, Internal Medicine Resident  [facility]         1/24/2022, 1:19 AM   Attending Physician Statement  I have discussed the care of Julianna Sosa, including pertinent history and exam findings,  with the resident. I have seen and examined the patient and the key elements of all parts of the encounter have been performed by me. I agree with the assessment, plan and orders as documented by the resident with additions . Treatment plan Discussed with nursing staff in detail , all questions answered . Electronically signed by Dano Fong MD on   1/24/22 at 9:23 AM EST    Please note that this chart was generated using voice recognition Dragon dictation software. Although every effort was made to ensure the accuracy of this automated transcription, some errors in transcription may have occurred.

## 2022-01-25 VITALS
TEMPERATURE: 98 F | BODY MASS INDEX: 39.17 KG/M2 | WEIGHT: 315 LBS | HEART RATE: 87 BPM | RESPIRATION RATE: 19 BRPM | SYSTOLIC BLOOD PRESSURE: 152 MMHG | DIASTOLIC BLOOD PRESSURE: 80 MMHG | HEIGHT: 75 IN | OXYGEN SATURATION: 94 %

## 2022-01-25 LAB
ABSOLUTE EOS #: 0.25 K/UL (ref 0–0.44)
ABSOLUTE IMMATURE GRANULOCYTE: 0.03 K/UL (ref 0–0.3)
ABSOLUTE LYMPH #: 1.57 K/UL (ref 1.1–3.7)
ABSOLUTE MONO #: 0.51 K/UL (ref 0.1–1.2)
ANION GAP SERPL CALCULATED.3IONS-SCNC: 14 MMOL/L (ref 9–17)
BASOPHILS # BLD: 1 % (ref 0–2)
BASOPHILS ABSOLUTE: 0.06 K/UL (ref 0–0.2)
BUN BLDV-MCNC: 20 MG/DL (ref 8–23)
BUN/CREAT BLD: ABNORMAL (ref 9–20)
CALCIUM SERPL-MCNC: 9.2 MG/DL (ref 8.6–10.4)
CHLORIDE BLD-SCNC: 107 MMOL/L (ref 98–107)
CO2: 17 MMOL/L (ref 20–31)
CREAT SERPL-MCNC: 1.04 MG/DL (ref 0.7–1.2)
DIFFERENTIAL TYPE: ABNORMAL
EOSINOPHILS RELATIVE PERCENT: 4 % (ref 1–4)
GFR AFRICAN AMERICAN: >60 ML/MIN
GFR NON-AFRICAN AMERICAN: >60 ML/MIN
GFR SERPL CREATININE-BSD FRML MDRD: ABNORMAL ML/MIN/{1.73_M2}
GFR SERPL CREATININE-BSD FRML MDRD: ABNORMAL ML/MIN/{1.73_M2}
GLUCOSE BLD-MCNC: 95 MG/DL (ref 70–99)
HCT VFR BLD CALC: 45.1 % (ref 40.7–50.3)
HEMOGLOBIN: 14.9 G/DL (ref 13–17)
IMMATURE GRANULOCYTES: 1 %
LYMPHOCYTES # BLD: 26 % (ref 24–43)
MCH RBC QN AUTO: 27.9 PG (ref 25.2–33.5)
MCHC RBC AUTO-ENTMCNC: 33 G/DL (ref 28.4–34.8)
MCV RBC AUTO: 84.5 FL (ref 82.6–102.9)
MONOCYTES # BLD: 8 % (ref 3–12)
NRBC AUTOMATED: 0 PER 100 WBC
PARTIAL THROMBOPLASTIN TIME: 61.7 SEC (ref 20.5–30.5)
PDW BLD-RTO: 14.5 % (ref 11.8–14.4)
PLATELET # BLD: 149 K/UL (ref 138–453)
PLATELET ESTIMATE: ABNORMAL
PMV BLD AUTO: 11.4 FL (ref 8.1–13.5)
POTASSIUM SERPL-SCNC: 4 MMOL/L (ref 3.7–5.3)
RBC # BLD: 5.34 M/UL (ref 4.21–5.77)
RBC # BLD: ABNORMAL 10*6/UL
SEG NEUTROPHILS: 60 % (ref 36–65)
SEGMENTED NEUTROPHILS ABSOLUTE COUNT: 3.68 K/UL (ref 1.5–8.1)
SODIUM BLD-SCNC: 138 MMOL/L (ref 135–144)
WBC # BLD: 6.1 K/UL (ref 3.5–11.3)
WBC # BLD: ABNORMAL 10*3/UL

## 2022-01-25 PROCEDURE — 6370000000 HC RX 637 (ALT 250 FOR IP): Performed by: STUDENT IN AN ORGANIZED HEALTH CARE EDUCATION/TRAINING PROGRAM

## 2022-01-25 PROCEDURE — 2580000003 HC RX 258: Performed by: STUDENT IN AN ORGANIZED HEALTH CARE EDUCATION/TRAINING PROGRAM

## 2022-01-25 PROCEDURE — 36415 COLL VENOUS BLD VENIPUNCTURE: CPT

## 2022-01-25 PROCEDURE — 80048 BASIC METABOLIC PNL TOTAL CA: CPT

## 2022-01-25 PROCEDURE — 6360000002 HC RX W HCPCS: Performed by: STUDENT IN AN ORGANIZED HEALTH CARE EDUCATION/TRAINING PROGRAM

## 2022-01-25 PROCEDURE — 94761 N-INVAS EAR/PLS OXIMETRY MLT: CPT

## 2022-01-25 PROCEDURE — 6370000000 HC RX 637 (ALT 250 FOR IP): Performed by: NURSE PRACTITIONER

## 2022-01-25 PROCEDURE — 85025 COMPLETE CBC W/AUTO DIFF WBC: CPT

## 2022-01-25 PROCEDURE — 85730 THROMBOPLASTIN TIME PARTIAL: CPT

## 2022-01-25 PROCEDURE — 99238 HOSP IP/OBS DSCHRG MGMT 30/<: CPT | Performed by: INTERNAL MEDICINE

## 2022-01-25 RX ORDER — DILTIAZEM HYDROCHLORIDE 120 MG/1
120 CAPSULE, COATED, EXTENDED RELEASE ORAL DAILY
Status: DISCONTINUED | OUTPATIENT
Start: 2022-01-25 | End: 2022-01-25 | Stop reason: HOSPADM

## 2022-01-25 RX ADMIN — APIXABAN 5 MG: 5 TABLET, FILM COATED ORAL at 11:26

## 2022-01-25 RX ADMIN — HEPARIN SODIUM AND DEXTROSE 14 UNITS/KG/HR: 10000; 5 INJECTION INTRAVENOUS at 07:18

## 2022-01-25 RX ADMIN — DILTIAZEM HYDROCHLORIDE 120 MG: 120 CAPSULE, COATED, EXTENDED RELEASE ORAL at 11:26

## 2022-01-25 RX ADMIN — SODIUM CHLORIDE, PRESERVATIVE FREE 10 ML: 5 INJECTION INTRAVENOUS at 08:55

## 2022-01-25 RX ADMIN — PANTOPRAZOLE SODIUM 40 MG: 40 TABLET, DELAYED RELEASE ORAL at 07:18

## 2022-01-25 ASSESSMENT — PAIN SCALES - GENERAL: PAINLEVEL_OUTOF10: 0

## 2022-01-25 NOTE — PROGRESS NOTES
[facility]  Progress Note                   Date:   1/25/2022  Patient name: Kael Rai  Date of admission:  1/23/2022 12:14 PM  MRN:   [id number]  YOB: 1955  PCP: Deejay Soto    Reason for Admission: Atrial fibrillation with RVR ([facility] [address] [I48.91]    Subjective:       Clinical Changes /Abnormalities:Patient seen and examined. Denies chest pain or shortness of breath. Tele/vitals/labs reviewed . Discussed case with RN. No issues overnight per nurse. Review of Systems    Medications:   Scheduled Meds:   apixaban  5 mg Oral BID    dilTIAZem  120 mg Oral Daily    pantoprazole  40 mg Oral QAM AC    sodium chloride flush  5-40 mL IntraVENous 2 times per day    betamethasone acetate-betamethasone sodium phosphate  3 mg Intra-LESional Once     Continuous Infusions:   sodium chloride       CBC:   Recent Labs     01/23/22  1222 01/24/22  0730 01/25/22  0554   WBC 6.4 7.5 6.1   HGB 15.9 15.8 14.9    167 149     BMP:    Recent Labs     01/23/22  1222 01/24/22  0730 01/25/22  0554    141 138   K 4.0 4.2 4.0    106 107   CO2 23 22 17*   BUN 15 18 20   CREATININE 1.20 1.06 1.04   GLUCOSE 94 99 95     Hepatic:No results for input(s): AST, ALT, ALB, BILITOT, ALKPHOS in the last 72 hours. Troponin:   Recent Labs     01/23/22  1222 01/23/22  1331   TROPHS 11 12     BNP: No results for input(s): BNP in the last 72 hours. Lipids: No results for input(s): CHOL, HDL in the last 72 hours. Invalid input(s): LDLCALCU  INR: No results for input(s): INR in the last 72 hours. EKG:    Date: 01/24/22  Reading: No acute ischemia        LAST ECHO: 08/14/2015  Left ventricle is normal in size. Systolic function is normal.  Estimated ejection fraction is 55-60%. No significant valvular regurgitation or stenosis seen. No significant pericardial effusion is seen.        LAST Stress Test:   None documented     LAST Cardiac Angiography:.   Ablation for atrial fibrillation on 8/13/2015    Echo 1/24/2022    CONCLUSIONS     Summary  Left ventricle is normal in size. Global left ventricular systolic function  is normal.  Estimated ejection fraction is 55 % . Mild left ventricular hypertrophy. No obvious wall motion abnormality seen. Left atrium is normal in size. Right atrial dilatation. Right ventricular dilatation with normal systolic function. No significant valvular abnormalities.       Objective:   Vitals: BP (!) 152/80   Pulse 87   Temp 98 °F (36.7 °C) (Oral)   Resp 19   Ht 6' 3\" (1.905 m)   Wt (!) 316 lb (143.3 kg)   SpO2 94%   BMI 39.50 kg/m²   General appearance: alert and cooperative with exam  HEENT: Head: Normocephalic, no lesions, without obvious abnormality. Neck:no JVD, trachea midline, no adenopathy  Lungs: Clear to auscultation  Heart: Regular rate and rhythm, s1/s2 auscultated, no murmurs  Abdomen: soft, non-tender, bowel sounds active  Extremities: no edema  Neurologic: not done        Assessment / Acute Cardiac Problems:   Atrial fibrillation with RVR and associated dizziness upon standing  Hypertension  Hyperlipidemia  Obstructive sleep apnea    Patient Active Problem List:     Chest pain     Paroxysmal atrial fibrillation (HCC)     Acute vasomotor rhinitis     Adenocarcinoma of prostate (HCC)     Adiposity     Cardiomyopathy ([facility] [address]     Dermatitis     Eczema     Erectile dysfunction following radical prostatectomy     Fear of flying     GERD (gastroesophageal reflux disease)     Herpes simplex type 1 infection     Hyperlipidemia     Hypertension     Incisional hernia, without obstruction or gangrene     Intestinal volvulus (HCC)     Obstructive sleep apnea syndrome     Osteoarthritis of knee     Overactive bladder     Strain of lumbar region     Ventral incisional hernia     Viral upper respiratory tract infection     Atrial fibrillation with RVR (HCC)     S/P prostatectomy      Plan of Treatment:   1.  New onset A. fib within 48 hours-had a cardioversion yesterday, currently in sinus rhythm. We will discontinue heparin and restart  Eliquis . We will switch Cardizem drip to p.o and restart home meds . Will restart home dose betapace. 2. Restart statin  3. Echo reviewed as above-preserved LV function, no wall wall abnormality or valvular disease  4. Ok to discharge home from cardiology standpoint .  Follow up with Dr. Nahun Munoz and Dr. Jeovanny Hill as outpatient     Electronically signed by ROSLYN Hoang CNP on 1/25/2022 at 12:42 PM  [address]  [phone]

## 2022-01-25 NOTE — PROGRESS NOTES
[facility]  Internal Medicine Teaching Residency Program  Inpatient Daily Progress Note  ______________________________________________________________________________    Patient: Karen Teixeira  YOB: 1955   N:[id number]    Acct: [de-identified]   Room: [address] [place], [place] [address]  Admit date: 1/23/2022  Today's date: 01/25/22  Number of days in the hospital: 2    SUBJECTIVE   CC: Dizziness (feeling like passing out) and Chest Pain    Pt examined at bedside. Chart & results reviewed. - VSS, pt is saturating well on RA. Cardioverted yesterday, in NSR today. Echo completes yesterday, unremarkable. - labs reviewed   - no acute events overnight.   - Patient denies headache, vision problems, nausea, vomiting, chest pain, cough, abdominal pain, changes in bowel or urinary habits, and swelling. ROS:  General ROS: Completed and except as mentioned above were negative   HEENT ROS: Completed and except as mentioned above were negative   Allergy and Immunology ROS:  Completed and except as mentioned above were negative  Hematological and Lymphatic ROS:  Completed and except as mentioned above were negative  Respiratory ROS:  Completed and except as mentioned above were negative  Cardiovascular ROS:  Completed and except as mentioned above were negative  Gastrointestinal ROS: Completed and except as mentioned above were negative  Genito-Urinary ROS:  Completed and except as mentioned above were negative  Musculoskeletal ROS:  Completed and except as mentioned above were negative  Neurological ROS:  Completed and except as mentioned above were negative  Skin & Dermatological ROS:  Completed and except as mentioned above were negative  Psychological ROS:  Completed and except as mentioned above were negative  BRIEF HISTORY     The patient is a 78 y. o. male who presented after complaining of palpitations which started this morning.  Patient has a past medical history of atrial fibrillation status post ablation 6 to 7 years ago done by Dr. Shlomo Daniels to the patient patient had palpitation associated with lightheadedness and dizziness.  Patient denies any chest pain.  Patient does complain of mild shortness of breath, no oxygen use at home.  Patient is not on anticoagulation at home.     On arrival to the ED, patient patient was tachycardic, BP on the higher side other vital signs stable patient was in atrial fibrillation rhythm.  Patient was started on heparin and Cardizem drip.  Pertinent labs were ordered which were unremarkable.  Troponins and proBNP within normal limits.     When I went to evaluate the patient, the patient was resting comfortably on the bed.  According to the patient, he follows up with a cardiologist [place] [address]. Pt had COVID 3 weeks ago.      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     Social:  -Tobacco: quit yrs ago   -Alcohol: neg   -Illicit Drug Use: neg    OBJECTIVE     Vital Signs:  /87   Pulse 70   Temp 97.3 °F (36.3 °C) (Temporal)   Resp 23   Ht 6' 3\" (1.905 m)   Wt (!) 316 lb (143.3 kg)   SpO2 96%   BMI 39.50 kg/m²     Temp (24hrs), Av.1 °F (36.7 °C), Min:97.3 °F (36.3 °C), Max:98.6 °F (37 °C)    In: -   Out: 350 [Urine:350]    Physical Exam:  Vitals reviewed. Constitutional:       General: He is awake.      Appearance: Normal appearance. He is obese. HENT:      Head: Normocephalic. Eyes:      General: Lids are normal.      Pupils: Pupils are equal, round, and reactive to light. Cardiovascular:      Rate and Rhythm: Tachycardia present. Rhythm regularly irregular.      Pulses: Normal pulses.      Heart sounds: Normal heart sounds. Pulmonary:      Effort: Pulmonary effort is normal.      Breath sounds: Normal breath sounds and air entry. Musculoskeletal:      Right lower leg: No swelling.      Left lower leg: No swelling. Skin:     General: Skin is warm.      Capillary Refill: Capillary refill takes less than 2 seconds. Neurological:      Mental Status: He is alert and oriented to person, place, and time. Psychiatric:         Attention and Perception: Attention normal. [place] and Affect: Mood normal.         Speech: Speech normal.         Behavior: Behavior is cooperative.      Medications:  Scheduled Medications:    pantoprazole  40 mg Oral QAM AC    sodium chloride flush  5-40 mL IntraVENous 2 times per day     Continuous Infusions:    dilTIAZem Stopped (01/24/22 1630)    heparin (PORCINE) Infusion 14 Units/kg/hr (01/25/22 0718)    sodium chloride       PRN Medicationsheparin (porcine), 10,000 Units, PRN  heparin (porcine), 5,000 Units, PRN  metoprolol, 5 mg, Q6H PRN  sodium chloride flush, 5-40 mL, PRN  sodium chloride, 25 mL, PRN  ondansetron, 4 mg, Q8H PRN   Or  ondansetron, 4 mg, Q6H PRN  polyethylene glycol, 17 g, Daily PRN  acetaminophen, 650 mg, Q6H PRN   Or  acetaminophen, 650 mg, Q6H PRN  potassium chloride, 40 mEq, PRN   Or  potassium alternative oral replacement, 40 mEq, PRN   Or  potassium chloride, 10 mEq, PRN        Diagnostic Labs:  CBC:   Recent Labs     01/23/22  1222 01/24/22  0730 01/25/22  0554   WBC 6.4 7.5 6.1   RBC 5.69 5.58 5.34   HGB 15.9 15.8 14.9   HCT 47.8 47.3 45.1   MCV 84.0 84.8 84.5   RDW 14.4 14.5* 14.5*    167 149     BMP:   Recent Labs     01/23/22  1222 01/24/22  0730 01/25/22  0554    141 138   K 4.0 4.2 4.0    106 107   CO2 23 22 17*   BUN 15 18 20   CREATININE 1.20 1.06 1.04     BNP: No results for input(s): BNP in the last 72 hours. PT/INR: No results for input(s): PROTIME, INR in the last 72 hours. APTT:   Recent Labs     01/24/22  1454 01/24/22  2250 01/25/22  0554   APTT 39.7* 88.0* 61.7*     CARDIAC ENZYMES: No results for input(s): CKMB, CKMBINDEX, TROPONINI in the last 72 hours.     Invalid input(s): CKTOTAL;3  FASTING LIPID PANEL:No results found for: CHOL, HDL, TRIG  LIVER PROFILE: No results for input(s): AST, ALT, ALB, BILIDIR, BILITOT, ALKPHOS in the last 72 hours. MICROBIOLOGY: No results found for: CULTURE    Imaging:    XR CHEST PORTABLE    Result Date: 1/23/2022  Stable mild cardiomegaly. Mild pulmonary vascular congestion. ASSESSMENT & PLAN     IMPRESSION  This is a 78 y. o. male who presented with palpitations and found to have atrial fibrillation with RVR. Patient admitted to inpatient status for the management of Atrial fibrillation with RVR.          Paroxysmal atrial fibrillation with RVR s/p ablation 6 to 7 years ago by Dr.Brenya GAYTANASc= 2 s/p cardioversion 1/24/2022   -D/C heparin drip, will add eliquis     Obstructive Sleep Apnea  -BiPAP overnight     DVT ppx: Heparin  GI ppx: Protonix  Diet: Adult Regular Diet  PT/OT: Consulted  Case Management: Consulted     Disposition: Discharge today. Geri Hua MD  PGY-1, Internal Medicine Resident  [facility]         1/25/2022, 7:40 AM   Attending Physician Statement  I have discussed the care of Cristela Osborn, including pertinent history and exam findings,  with the resident. I have seen and examined the patient and the key elements of all parts of the encounter have been performed by me. I agree with the assessment, plan and orders as documented by the resident with additions . Sinus rhythm using BiPAP for sleep apnea    Treatment plan Discussed with nursing staff in detail , all questions answered . Electronically signed by Skyler Villareal MD on   1/25/22 at 11:19 AM EST    Please note that this chart was generated using voice recognition Dragon dictation software. Although every effort was made to ensure the accuracy of this automated transcription, some errors in transcription may have occurred.

## 2022-01-25 NOTE — DISCHARGE SUMMARY
Patient discharged home with wife. Discharge instructions explained and given to patient, patient verbalized understanding. All lines removed prior to discharge. No signs of acute distress noted, no concerns voiced.

## 2022-01-25 NOTE — PLAN OF CARE
Problem: Falls - Risk of:  Goal: Will remain free from falls  Description: Will remain free from falls  1/25/2022 0259 by Gena Olmstead RN  Outcome: Ongoing    Goal: Absence of physical injury  Description: Absence of physical injury  1/25/2022 0259 by Gena Olmstead RN  Outcome: Ongoing    Problem: Bleeding:  Goal: Will show no signs and symptoms of excessive bleeding  Description: Will show no signs and symptoms of excessive bleeding  1/25/2022 0259 by Gena Olmstead RN  Outcome: Ongoing

## 2022-01-30 NOTE — DISCHARGE SUMMARY
[address]     Department of Internal Medicine - Staff Internal Medicine Teaching Service    INPATIENT DISCHARGE SUMMARY      Patient Identification:  Heydi Hernandez is a 78 y.o. male. :  1955  MRN: [id number]     Acct: [de-identified]   PCP: Carlos Nagel Date:  2022  Discharge date and time: 2022 11:32 AM   Attending Provider: No att. providers found                                     [facility] Problem Lists:  Principal Problem:    Atrial fibrillation with RVR ([facility] [address]  Active Problems:    Adenocarcinoma of prostate ([facility] [address]    GERD (gastroesophageal reflux disease)    Hypertension    Obstructive sleep apnea syndrome    S/P prostatectomy  Resolved Problems:    * No resolved hospital problems. [facility] STAY     Brief Inpatient course:   Heydi Hernandez is a 78 y.o. male who presented to the ED after complaining of palpitation during the morning of admission. Patient has a past medical history of atrial fibrillation status post ablation 6 to 7 years ago by Dr. Dominique Johnston. On arrival to the ED patient was tachycardic, BP on the higher side and was in atrial fibrillation rhythm. According to the patient patient was not on any anticoagulation after the ablation he had received 6 to 7 years ago. Patient was started on heparin and Cardizem drip. Cardiology was consulted, patient was cardioverted and he reverted back to normal sinus rhythm. Patient was discharged on Eliquis. Echocardiogram was done which showed preserved LV function, no WMA or valvular disease. Patient also have obstructive sleep apnea, he did receive his BiPAP overnight when sleeping. Patient was told to follow-up with his cardiologist in [address] [place] [address]. Patient was stable on discharge. 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     Procedures/ Significant Interventions:    Cardioversion  Echocardiogram    Consults:     Consults:     Final Specialist Recommendations/Findings:   IP CONSULT TO INTERNAL MEDICINE  IP CONSULT TO CARDIOLOGY  IP CONSULT TO CASE MANAGEMENT      Any Hospital Acquired Infections: none    Discharge Functional Status:  stable    DISCHARGE PLAN     Disposition: home    Patient Instructions:   Discharge Medication List as of 1/25/2022 11:12 AM      CONTINUE these medications which have CHANGED    Details   apixaban (ELIQUIS) 5 MG TABS tablet Take 1 tablet by mouth 2 times daily, Disp-60 tablet, R-0Normal         CONTINUE these medications which have NOT CHANGED    Details   atorvastatin (LIPITOR) 20 MG tablet Take 20 mg by mouthHistorical Med      predniSONE (DELTASONE) 20 MG tablet TAKE 1 TABLET BY MOUTH THREE TIMES DAILY FOR 3 DAYS THEN 1,TABLET. ..  (REFER TO PRESCRIPTION NOTES). , R-0Historical Med      ibuprofen (ADVIL;MOTRIN) 400 MG tablet Take 1 tablet by mouth 3 times daily (with meals), Disp-120 tablet, R-3      pantoprazole (PROTONIX) 40 MG tablet   daily       VESICARE 5 MG tablet   daily       tamsulosin (FLOMAX) 0.4 MG capsule   daily          STOP taking these medications       sotalol (BETAPACE) 80 MG tablet Comments:   Reason for Stopping:               Activity: activity as tolerated    Diet: regular diet    Follow-up:    [address], [facility] [id number] [phone]    In 1 week      [facility]  [address] [facility]  [phone]    If symptoms worsen    [facility]  [facility] [facility] [id number] [phone]  Schedule an appointment as soon as possible for a visit  As needed      Patient Instructions:   Please take all medications as prescribed. Please follow up with your Primary Care Provider and Cardiologist as soon as possible. Please make an appointment with [facility] as needed.   Please start taking Eliquis twice a day for 4 weeks. Note that over 30 minutes was spent in preparing discharge papers, discussing discharge with patient, medication review, etc.      Nancy Pandey MD  Internal Medicine Resident, PGY-1  [facility];  [place], [place]  1/30/2022, 11:06 AM

## 2022-01-31 ENCOUNTER — APPOINTMENT (OUTPATIENT)
Dept: CT IMAGING | Age: 67
DRG: 394 | End: 2022-01-31
Payer: MEDICARE

## 2022-01-31 ENCOUNTER — HOSPITAL ENCOUNTER (INPATIENT)
Age: 67
LOS: 2 days | Discharge: HOME | DRG: 394 | End: 2022-02-02
Attending: EMERGENCY MEDICINE | Admitting: INTERNAL MEDICINE
Payer: MEDICARE

## 2022-01-31 ENCOUNTER — APPOINTMENT (OUTPATIENT)
Dept: GENERAL RADIOLOGY | Age: 67
DRG: 394 | End: 2022-01-31
Payer: MEDICARE

## 2022-01-31 DIAGNOSIS — R10.31 ABDOMINAL PAIN, RIGHT LOWER QUADRANT: Primary | ICD-10-CM

## 2022-01-31 DIAGNOSIS — K56.609 SMALL BOWEL OBSTRUCTION (HCC): ICD-10-CM

## 2022-01-31 LAB
ABSOLUTE EOS #: 0.2 K/UL (ref 0–0.44)
ABSOLUTE IMMATURE GRANULOCYTE: 0.03 K/UL (ref 0–0.3)
ABSOLUTE LYMPH #: 1.48 K/UL (ref 1.1–3.7)
ABSOLUTE MONO #: 0.6 K/UL (ref 0.1–1.2)
ALBUMIN SERPL-MCNC: 4.4 G/DL (ref 3.5–5.2)
ALBUMIN/GLOBULIN RATIO: 2.2 (ref 1–2.5)
ALP BLD-CCNC: 80 U/L (ref 40–129)
ALT SERPL-CCNC: 17 U/L (ref 5–41)
ANION GAP SERPL CALCULATED.3IONS-SCNC: 10 MMOL/L (ref 9–17)
AST SERPL-CCNC: 11 U/L
BASOPHILS # BLD: 1 % (ref 0–2)
BASOPHILS ABSOLUTE: 0.07 K/UL (ref 0–0.2)
BILIRUB SERPL-MCNC: 0.74 MG/DL (ref 0.3–1.2)
BILIRUBIN URINE: NEGATIVE
BUN BLDV-MCNC: 15 MG/DL (ref 8–23)
BUN/CREAT BLD: NORMAL (ref 9–20)
CALCIUM SERPL-MCNC: 9.6 MG/DL (ref 8.6–10.4)
CHLORIDE BLD-SCNC: 102 MMOL/L (ref 98–107)
CO2: 23 MMOL/L (ref 20–31)
COLOR: YELLOW
COMMENT UA: NORMAL
CREAT SERPL-MCNC: 1.02 MG/DL (ref 0.7–1.2)
DIFFERENTIAL TYPE: ABNORMAL
EOSINOPHILS RELATIVE PERCENT: 2 % (ref 1–4)
GFR AFRICAN AMERICAN: >60 ML/MIN
GFR NON-AFRICAN AMERICAN: >60 ML/MIN
GFR SERPL CREATININE-BSD FRML MDRD: NORMAL ML/MIN/{1.73_M2}
GFR SERPL CREATININE-BSD FRML MDRD: NORMAL ML/MIN/{1.73_M2}
GLUCOSE BLD-MCNC: 88 MG/DL (ref 70–99)
GLUCOSE URINE: NEGATIVE
HCT VFR BLD CALC: 46 % (ref 40.7–50.3)
HEMOGLOBIN: 15.5 G/DL (ref 13–17)
IMMATURE GRANULOCYTES: 0 %
INR BLD: 1.3
KETONES, URINE: NEGATIVE
LACTIC ACID, WHOLE BLOOD: 1.3 MMOL/L (ref 0.7–2.1)
LACTIC ACID: NORMAL MMOL/L
LEUKOCYTE ESTERASE, URINE: NEGATIVE
LIPASE: 22 U/L (ref 13–60)
LYMPHOCYTES # BLD: 16 % (ref 24–43)
MCH RBC QN AUTO: 28.1 PG (ref 25.2–33.5)
MCHC RBC AUTO-ENTMCNC: 33.7 G/DL (ref 28.4–34.8)
MCV RBC AUTO: 83.5 FL (ref 82.6–102.9)
MONOCYTES # BLD: 6 % (ref 3–12)
NITRITE, URINE: NEGATIVE
NRBC AUTOMATED: 0 PER 100 WBC
PDW BLD-RTO: 14.4 % (ref 11.8–14.4)
PH UA: 6.5 (ref 5–8)
PLATELET # BLD: 159 K/UL (ref 138–453)
PLATELET ESTIMATE: ABNORMAL
PMV BLD AUTO: 10.7 FL (ref 8.1–13.5)
POTASSIUM SERPL-SCNC: 4.1 MMOL/L (ref 3.7–5.3)
PROTEIN UA: NEGATIVE
PROTHROMBIN TIME: 13.7 SEC (ref 9.1–12.3)
RBC # BLD: 5.51 M/UL (ref 4.21–5.77)
RBC # BLD: ABNORMAL 10*6/UL
SARS-COV-2, RAPID: NOT DETECTED
SEG NEUTROPHILS: 75 % (ref 36–65)
SEGMENTED NEUTROPHILS ABSOLUTE COUNT: 7.01 K/UL (ref 1.5–8.1)
SODIUM BLD-SCNC: 135 MMOL/L (ref 135–144)
SPECIFIC GRAVITY UA: 1.02 (ref 1–1.03)
SPECIMEN DESCRIPTION: NORMAL
TOTAL PROTEIN: 6.4 G/DL (ref 6.4–8.3)
TURBIDITY: CLEAR
URINE HGB: NEGATIVE
UROBILINOGEN, URINE: NORMAL
WBC # BLD: 9.4 K/UL (ref 3.5–11.3)
WBC # BLD: ABNORMAL 10*3/UL

## 2022-01-31 PROCEDURE — 2580000003 HC RX 258: Performed by: STUDENT IN AN ORGANIZED HEALTH CARE EDUCATION/TRAINING PROGRAM

## 2022-01-31 PROCEDURE — 96374 THER/PROPH/DIAG INJ IV PUSH: CPT

## 2022-01-31 PROCEDURE — 85610 PROTHROMBIN TIME: CPT

## 2022-01-31 PROCEDURE — 6360000002 HC RX W HCPCS: Performed by: STUDENT IN AN ORGANIZED HEALTH CARE EDUCATION/TRAINING PROGRAM

## 2022-01-31 PROCEDURE — 81003 URINALYSIS AUTO W/O SCOPE: CPT

## 2022-01-31 PROCEDURE — 83690 ASSAY OF LIPASE: CPT

## 2022-01-31 PROCEDURE — 85025 COMPLETE CBC W/AUTO DIFF WBC: CPT

## 2022-01-31 PROCEDURE — 96375 TX/PRO/DX INJ NEW DRUG ADDON: CPT

## 2022-01-31 PROCEDURE — 74018 RADEX ABDOMEN 1 VIEW: CPT

## 2022-01-31 PROCEDURE — 83605 ASSAY OF LACTIC ACID: CPT

## 2022-01-31 PROCEDURE — 99285 EMERGENCY DEPT VISIT HI MDM: CPT

## 2022-01-31 PROCEDURE — 80053 COMPREHEN METABOLIC PANEL: CPT

## 2022-01-31 PROCEDURE — 1200000000 HC SEMI PRIVATE

## 2022-01-31 PROCEDURE — 87635 SARS-COV-2 COVID-19 AMP PRB: CPT

## 2022-01-31 PROCEDURE — 74177 CT ABD & PELVIS W/CONTRAST: CPT

## 2022-01-31 PROCEDURE — 96376 TX/PRO/DX INJ SAME DRUG ADON: CPT

## 2022-01-31 PROCEDURE — 6360000004 HC RX CONTRAST MEDICATION: Performed by: STUDENT IN AN ORGANIZED HEALTH CARE EDUCATION/TRAINING PROGRAM

## 2022-01-31 RX ORDER — MORPHINE SULFATE 4 MG/ML
4 INJECTION, SOLUTION INTRAMUSCULAR; INTRAVENOUS ONCE
Status: COMPLETED | OUTPATIENT
Start: 2022-01-31 | End: 2022-01-31

## 2022-01-31 RX ORDER — 0.9 % SODIUM CHLORIDE 0.9 %
1000 INTRAVENOUS SOLUTION INTRAVENOUS ONCE
Status: COMPLETED | OUTPATIENT
Start: 2022-01-31 | End: 2022-01-31

## 2022-01-31 RX ORDER — ONDANSETRON 2 MG/ML
4 INJECTION INTRAMUSCULAR; INTRAVENOUS ONCE
Status: COMPLETED | OUTPATIENT
Start: 2022-01-31 | End: 2022-01-31

## 2022-01-31 RX ADMIN — ONDANSETRON 4 MG: 2 INJECTION INTRAMUSCULAR; INTRAVENOUS at 18:39

## 2022-01-31 RX ADMIN — IOPAMIDOL 75 ML: 755 INJECTION, SOLUTION INTRAVENOUS at 19:28

## 2022-01-31 RX ADMIN — MORPHINE SULFATE 4 MG: 4 INJECTION INTRAVENOUS at 20:45

## 2022-01-31 RX ADMIN — SODIUM CHLORIDE 1000 ML: 9 INJECTION, SOLUTION INTRAVENOUS at 17:46

## 2022-01-31 RX ADMIN — MORPHINE SULFATE 4 MG: 4 INJECTION INTRAVENOUS at 17:46

## 2022-01-31 ASSESSMENT — ENCOUNTER SYMPTOMS
BACK PAIN: 0
BLOOD IN STOOL: 0
CONSTIPATION: 1
ABDOMINAL PAIN: 1
DIARRHEA: 0
PHOTOPHOBIA: 0
SHORTNESS OF BREATH: 0
VOMITING: 0
NAUSEA: 1

## 2022-01-31 ASSESSMENT — PAIN DESCRIPTION - PAIN TYPE: TYPE: ACUTE PAIN

## 2022-01-31 ASSESSMENT — PAIN SCALES - GENERAL
PAINLEVEL_OUTOF10: 8
PAINLEVEL_OUTOF10: 0
PAINLEVEL_OUTOF10: 8

## 2022-01-31 ASSESSMENT — PAIN DESCRIPTION - FREQUENCY: FREQUENCY: INTERMITTENT

## 2022-01-31 ASSESSMENT — PAIN DESCRIPTION - DESCRIPTORS: DESCRIPTORS: SHARP;SHOOTING

## 2022-01-31 ASSESSMENT — PAIN DESCRIPTION - ORIENTATION: ORIENTATION: RIGHT;LOWER

## 2022-01-31 ASSESSMENT — PAIN DESCRIPTION - LOCATION: LOCATION: ABDOMEN

## 2022-01-31 ASSESSMENT — PAIN DESCRIPTION - PROGRESSION: CLINICAL_PROGRESSION: GRADUALLY WORSENING

## 2022-01-31 NOTE — ED PROVIDER NOTES
[facility]     Emergency Department     Faculty Attestation    I performed a history and physical examination of the patient and discussed management with the resident. I reviewed the resident´s note and agree with the documented findings and plan of care. Any areas of disagreement are noted on the chart. I was personally present for the key portions of any procedures. I have documented in the chart those procedures where I was not present during the key portions. I have reviewed the emergency nurses triage note. I agree with the chief complaint, past medical history, past surgical history, allergies, medications, social and family history as documented unless otherwise noted below. For Physician Assistant/ Nurse Practitioner cases/documentation I have personally evaluated this patient and have completed at least one if not all key elements of the E/M (history, physical exam, and MDM). Additional findings are as noted. Sudden onset right lower quadrant abdominal pain several hours ago. Patient has history of prostatectomy many years ago, patient claims of nausea, denies urinary tract symptoms, no CVA tenderness. Right lower quadrant pain with voluntary guarding and no rebound.      Jesse Osullivan MD  01/31/22 2305

## 2022-01-31 NOTE — ED PROVIDER NOTES
[address] ED  Emergency Department Encounter  EmergencyMedicine Resident     Pt Annomero Signs  MRN: [id number]  Eligfjanel 1955  Date of evaluation: 1/31/22  PCP:  Gretchen Carrillo    This patient was evaluated in the Emergency Department for symptoms described in the history of present illness. The patient was evaluated in the context of the global COVID-19 pandemic, which necessitated consideration that the patient might be at risk for infection with the SARS-CoV-2 virus that causes COVID-19. Institutional protocols and algorithms that pertain to the evaluation of patients at risk for COVID-19 are in a state of rapid change based on information released by regulatory bodies including the CDC and federal and state organizations. These policies and algorithms were followed during the patient's care in the ED. CHIEF COMPLAINT       Chief Complaint   Patient presents with    Abdominal Pain     RLQ       HISTORY OF PRESENT ILLNESS  (Location/Symptom, Timing/Onset, Context/Setting, Quality, Duration, Modifying Factors, Severity.)      Kay Chaudhry is a 78 y.o. male who presents with lower quadrant abdominal pain which began approximately 5 and half hours ago. Patient states that began progressively worse since that time has been feeling nauseous no vomiting. Has been able to have a bowel movement he felt constipated however has been able to urinate. Patient has a history of bowel obstruction as well as a radical prostatectomy. Initially presented to urgent care and was directed to the emergency care for further management. PAST MEDICAL / SURGICAL / SOCIAL / FAMILY HISTORY      has a past medical history of Adiposity, Atrial fibrillation and flutter ([address] Cancer ([address] Chronic kidney disease, Fatigue, Fear of flying, GERD (gastroesophageal reflux disease), Hyperlipidemia, Hypertension, Osteoarthritis of knee, Overactive bladder, Palpitations, and Sleep apnea.        has a past surgical history that includes Kidney stone surgery (y-4); hernia repair; joint replacement; Cardioversion; Cardiac surgery; and ablation of dysrhythmic focus (8/13/15). Social History     Socioeconomic History    Marital status:      Spouse name: Not on file    Number of children: Not on file    Years of education: Not on file    Highest education level: Not on file   Occupational History    Not on file   Tobacco Use    Smoking status: Former Smoker    Smokeless tobacco: Never Used   Substance and Sexual Activity    Alcohol use: No    Drug use: No    Sexual activity: Yes     Partners: Female   Other Topics Concern    Not on file   Social History Narrative    Not on file     Social Determinants of Health     Financial Resource Strain:     Difficulty of Paying Living Expenses: Not on file   Food Insecurity:     Worried About Running Out of Food in the Last Year: Not on file    Iraida of Food in the Last Year: Not on file   Transportation Needs:     Lack of Transportation (Medical): Not on file    Lack of Transportation (Non-Medical):  Not on file   Physical Activity:     Days of Exercise per Week: Not on file    Minutes of Exercise per Session: Not on file   Stress:     Feeling of Stress : Not on file   Social Connections:     Frequency of Communication with Friends and Family: Not on file    Frequency of Social Gatherings with Friends and Family: Not on file    Attends Yazidism Services: Not on file    Active Member of Clubs or Organizations: Not on file    Attends Club or Organization Meetings: Not on file    Marital Status: Not on file   Intimate Partner Violence:     Fear of Current or Ex-Partner: Not on file    Emotionally Abused: Not on file    Physically Abused: Not on file    Sexually Abused: Not on file   Housing Stability:     Unable to Pay for Housing in the Last Year: Not on file    Number of [place] in the Last Year: Not on file    Unstable Housing in the Last Year: Not on file       Family History   Problem Relation Age of Onset    Colon Cancer Mother     Heart Disease Father        Allergies:  Patient has no known allergies. Home Medications:  Prior to Admission medications    Medication Sig Start Date End Date Taking? Authorizing Provider   apixaban (ELIQUIS) 5 MG TABS tablet Take 1 tablet by mouth 2 times daily 1/24/22   Evon Pérez MD   atorvastatin (LIPITOR) 20 MG tablet Take 20 mg by mouth 2/21/18   Historical Provider, MD   predniSONE (DELTASONE) 20 MG tablet TAKE 1 TABLET BY MOUTH THREE TIMES DAILY FOR 3 DAYS THEN 1,TABLET. ..  (REFER TO PRESCRIPTION NOTES). 4/17/17   Historical Provider, MD   ibuprofen (ADVIL;MOTRIN) 400 MG tablet Take 1 tablet by mouth 3 times daily (with meals) 8/16/15   Iram Lo MD   pantoprazole (PROTONIX) 40 MG tablet   daily  4/16/15   Historical Provider, MD   VESICARE 5 MG tablet   daily  4/16/15   Historical Provider, MD   tamsulosin (FLOMAX) 0.4 MG capsule   daily  4/22/15   Historical Provider, MD       REVIEW OF SYSTEMS    (2-9 systems for level 4, 10 or more for level 5)      Review of Systems   Constitutional: Negative for fever. HENT: Negative for congestion. Eyes: Negative for photophobia. Respiratory: Negative for shortness of breath. Cardiovascular: Negative for chest pain. Gastrointestinal: Positive for abdominal pain, constipation and nausea. Negative for blood in stool, diarrhea and vomiting. Genitourinary: Negative for dysuria, flank pain and hematuria. Musculoskeletal: Negative for back pain. Skin: Negative for pallor, rash and wound. Allergic/Immunologic: Negative for environmental allergies and food allergies. Neurological: Negative for syncope, weakness and headaches. Hematological: Negative for adenopathy. Bruises/bleeds easily. Psychiatric/Behavioral: Negative for agitation.        PHYSICAL EXAM   (up to 7 for level 4, 8 or more for level 5)      INITIAL VITALS:   BP (!) 153/90   Pulse 66   Temp 97.7 °F (36.5 °C) (Oral)   Resp 18   Ht 6' 3\" (1.905 m)   Wt (!) 310 lb (140.6 kg)   SpO2 94%   BMI 38.75 kg/m²     Physical Exam  Vitals and nursing note reviewed. Constitutional:       Appearance: He is obese. He is ill-appearing. He is not toxic-appearing. HENT:      Head: Normocephalic and atraumatic. Right Ear: External ear normal.      Left Ear: External ear normal.      Nose: Nose normal.      Mouth/Throat:      Pharynx: Oropharynx is clear. Eyes:      General: No scleral icterus. Conjunctiva/sclera: Conjunctivae normal.   Cardiovascular:      Rate and Rhythm: Normal rate. Pulses: Normal pulses. Pulmonary:      Effort: Pulmonary effort is normal. No respiratory distress. Abdominal:      Palpations: Abdomen is soft. Tenderness: There is abdominal tenderness. There is guarding. There is no right CVA tenderness or left CVA tenderness. Musculoskeletal:         General: Normal range of motion. Cervical back: Normal range of motion. Skin:     General: Skin is warm. Capillary Refill: Capillary refill takes less than 2 seconds. Neurological:      Mental Status: He is alert and oriented to person, place, and time. Psychiatric:         Mood and Affect: Mood normal.         DIFFERENTIAL  DIAGNOSIS     PLAN (LABS / IMAGING / EKG):  Orders Placed This Encounter   Procedures    CT ABDOMEN PELVIS W IV CONTRAST Additional Contrast? None    LIPASE    Lactic Acid, Plasma    Urinalysis Reflex to Culture    CBC WITH AUTO DIFFERENTIAL    COMPREHENSIVE METABOLIC PANEL       MEDICATIONS ORDERED:  Orders Placed This Encounter   Medications    morphine injection 4 mg    0.9 % sodium chloride bolus    ondansetron (ZOFRAN) injection 4 mg    iopamidol (ISOVUE-370) 76 % injection 75 mL       DDX: appendicitis, colitis, uti, pyelonephritis, obstruction.     DIAGNOSTIC RESULTS / EMERGENCY DEPARTMENT COURSE / MDM   LAB RESULTS:  Results for orders placed or performed during the hospital encounter of 01/31/22   LIPASE   Result Value Ref Range    Lipase 22 13 - 60 U/L   Lactic Acid, Plasma   Result Value Ref Range    Lactic Acid NOT REPORTED mmol/L    Lactic Acid, Whole Blood 1.3 0.7 - 2.1 mmol/L   Urinalysis Reflex to Culture    Specimen: Urine, clean catch   Result Value Ref Range    Color, UA Yellow Yellow    Turbidity UA Clear Clear    Glucose, Ur NEGATIVE NEGATIVE    Bilirubin Urine NEGATIVE NEGATIVE    Ketones, Urine NEGATIVE NEGATIVE    Specific Gravity, UA 1.020 1.005 - 1.030    Urine Hgb NEGATIVE NEGATIVE    pH, UA 6.5 5.0 - 8.0    Protein, UA NEGATIVE NEGATIVE    Urobilinogen, Urine Normal Normal    Nitrite, Urine NEGATIVE NEGATIVE    Leukocyte Esterase, Urine NEGATIVE NEGATIVE    Urinalysis Comments       Microscopic exam not performed based on chemical results unless requested in original order.    CBC WITH AUTO DIFFERENTIAL   Result Value Ref Range    WBC 9.4 3.5 - 11.3 k/uL    RBC 5.51 4.21 - 5.77 m/uL    Hemoglobin 15.5 13.0 - 17.0 g/dL    Hematocrit 46.0 40.7 - 50.3 %    MCV 83.5 82.6 - 102.9 fL    MCH 28.1 25.2 - 33.5 pg    MCHC 33.7 28.4 - 34.8 g/dL    RDW 14.4 11.8 - 14.4 %    Platelets 065 164 - 590 k/uL    MPV 10.7 8.1 - 13.5 fL    NRBC Automated 0.0 0.0 per 100 WBC    Differential Type NOT REPORTED     WBC Morphology NOT REPORTED     RBC Morphology NOT REPORTED     Platelet Estimate NOT REPORTED     Seg Neutrophils 75 (H) 36 - 65 %    Lymphocytes 16 (L) 24 - 43 %    Monocytes 6 3 - 12 %    Eosinophils % 2 1 - 4 %    Basophils 1 0 - 2 %    Immature Granulocytes 0 0 %    Segs Absolute 7.01 1.50 - 8.10 k/uL    Absolute Lymph # 1.48 1.10 - 3.70 k/uL    Absolute Mono # 0.60 0.10 - 1.20 k/uL    Absolute Eos # 0.20 0.00 - 0.44 k/uL    Basophils Absolute 0.07 0.00 - 0.20 k/uL    Absolute Immature Granulocyte 0.03 0.00 - 0.30 k/uL   COMPREHENSIVE METABOLIC PANEL   Result Value Ref Range    Glucose 88 70 - 99 mg/dL    BUN 15 8 - 23 mg/dL CREATININE 1.02 0.70 - 1.20 mg/dL    Bun/Cre Ratio NOT REPORTED 9 - 20    Calcium 9.6 8.6 - 10.4 mg/dL    Sodium 135 135 - 144 mmol/L    Potassium 4.1 3.7 - 5.3 mmol/L    Chloride 102 98 - 107 mmol/L    CO2 23 20 - 31 mmol/L    Anion Gap 10 9 - 17 mmol/L    Alkaline Phosphatase 80 40 - 129 U/L    ALT 17 5 - 41 U/L    AST 11 <40 U/L    Total Bilirubin 0.74 0.3 - 1.2 mg/dL    Total Protein 6.4 6.4 - 8.3 g/dL    Albumin 4.4 3.5 - 5.2 g/dL    Albumin/Globulin Ratio 2.2 1.0 - 2.5    GFR Non-African American >60 >60 mL/min    GFR African American >60 >60 mL/min    GFR Comment          GFR Staging NOT REPORTED        IMPRESSION: pt is alert oriented nontoxic 78 yom with sudden onset of rlq abdominal pain history of abdominal surgery, nausea, no vomiting passing gas has had bm since onset. Plan will be ua, ct abdomen pelvis, labs, analgesia reeval    RADIOLOGY:  CT ABDOMEN PELVIS W IV CONTRAST Additional Contrast? None    Result Date: 1/31/2022  EXAMINATION: CT OF THE ABDOMEN AND PELVIS WITH CONTRAST 1/31/2022 6:12 pm TECHNIQUE: CT of the abdomen and pelvis was performed with the administration of intravenous contrast. Multiplanar reformatted images are provided for review. Dose modulation, iterative reconstruction, and/or weight based adjustment of the mA/kV was utilized to reduce the radiation dose to as low as reasonably achievable. COMPARISON: None. HISTORY: ORDERING SYSTEM PROVIDED HISTORY: right lower quadrant abdominal pain TECHNOLOGIST PROVIDED HISTORY: right lower quadrant abdominal pain Decision Support Exception - unselect if not a suspected or confirmed emergency medical condition->Emergency Medical Condition (MA) Reason for Exam: right lower quadrant abdominal pain FINDINGS: Lower Chest: Heart size is normal.  The visualized lung bases are clear. Organs: Several benign bilateral renal and hepatic cysts are noted, largest within the left upper pole measuring 7.4 cm.   There are numerous nonobstructing stones throughout both kidneys, greater on the left, measuring 1-4 mm in size. Focal chronic scarring of the lower right renal cortex. The liver, spleen, pancreas, kidneys, gallbladder, and adrenal glands otherwise appear normal. GI/Bowel: Post-surgical changes of ventral abdominal wall hernia repair are present. There is an acute obstruction of the small bowel due to a focal segment of herniated small bowel just left of the hernia repair site within the periumbilical ventral left mid abdomen. Distended small bowel loops proximal to this are fluid-filled and measure up to 4.5 cm in caliber. There is mild diffuse mesenteric edema but no bowel wall thickening and no free air or pneumatosis or mesenteric or portal venous gas. A few scattered diverticula are present throughout the sigmoid colon. No findings of acute diverticulitis. Small hiatal hernia. The appendix is normal. Peritoneum/Retroperitoneum: Trace mesenteric free fluid. No free air or abnormal fluid collection. No enlarged or suspicious mesenteric or retroperitoneal lymphadenopathy. The abdominal aorta and iliac arteries are patent and of normal caliber. Pelvis: No pelvic free fluid or enlarged or suspicious pelvic or inguinal lymphadenopathy. The urinary bladder is decompressed. The prostate gland has been removed. The pelvic bowel loops are unremarkable. Bones/Soft Tissues: Degenerative changes are present throughout the lumbar spine. No acute fracture. No suspicious bone lesions. Acute small bowel obstruction due to a focal periumbilical ventral abdominal wall hernia just left of midline. Sigmoid diverticulosis. Small hiatal hernia. Nonobstructing bilateral renal calculi.        EKG  none    All EKG's are interpreted by the Emergency Department Physician who either signs or Co-signs this chart in the absence of a cardiologist.    EMERGENCY DEPARTMENT COURSE:  ED Course as of 01/31/22 2014 Mon Jan 31, 2022   1931 Ct reviewed multiple hepatic and renal cyst [BG]   1946 Sudden onset RLQ abdomen pain, had bowel movement, no N/V, no trauma, no concern for AAA, lactic (-), need to follow up on CT abdomen, cysts noted on resident read of CT, if CT (-) d/c with PCP follow-up  [TJ]      ED Course User Index  [BG] Nabil Torres DO  [TJ] Maryjo Raphael MD         PROCEDURES:      CONSULTS:  None    CRITICAL CARE:      FINAL IMPRESSION      1. Abdominal pain, right lower quadrant          DISPOSITION / [address] transferred to dr. Kayla Whitfield pending results of ct    PATIENT REFERRED TO:  No follow-up provider specified.     DISCHARGE MEDICATIONS:  New Prescriptions    No medications on file       Nabil Torres DO  Emergency Medicine Resident    (Please note that portions of thisnote were completed with a voice recognition program.  Efforts were made to edit the dictations but occasionally words are mis-transcribed.)       Nabil Torres DO  Resident  01/31/22 2015

## 2022-01-31 NOTE — ED NOTES
Pt walk-in from home for c/o of RLQ abdominal pain that started a few hours ago. Pt. States last bowel movement was this afternoon, and did not relieve pain. Pt. Denies n/v. Pt. Last meal before noon. John JACKSON CP.       Alejandro Duncan  01/31/22 1753

## 2022-02-01 ENCOUNTER — APPOINTMENT (OUTPATIENT)
Dept: GENERAL RADIOLOGY | Age: 67
DRG: 394 | End: 2022-02-01
Payer: MEDICARE

## 2022-02-01 PROBLEM — U07.1 COVID-19: Status: ACTIVE | Noted: 2022-01-04

## 2022-02-01 PROBLEM — E66.01 MORBID OBESITY DUE TO EXCESS CALORIES (HCC): Status: ACTIVE | Noted: 2017-01-20

## 2022-02-01 PROBLEM — J30.1 SEASONAL ALLERGIC RHINITIS DUE TO POLLEN: Status: ACTIVE | Noted: 2020-06-25

## 2022-02-01 PROBLEM — E78.2 MIXED HYPERLIPIDEMIA: Status: ACTIVE | Noted: 2017-01-20

## 2022-02-01 LAB
ANION GAP SERPL CALCULATED.3IONS-SCNC: 10 MMOL/L (ref 9–17)
BUN BLDV-MCNC: 13 MG/DL (ref 8–23)
BUN/CREAT BLD: ABNORMAL (ref 9–20)
CALCIUM SERPL-MCNC: 9.2 MG/DL (ref 8.6–10.4)
CHLORIDE BLD-SCNC: 105 MMOL/L (ref 98–107)
CO2: 19 MMOL/L (ref 20–31)
CREAT SERPL-MCNC: 0.91 MG/DL (ref 0.7–1.2)
GFR AFRICAN AMERICAN: >60 ML/MIN
GFR NON-AFRICAN AMERICAN: >60 ML/MIN
GFR SERPL CREATININE-BSD FRML MDRD: ABNORMAL ML/MIN/{1.73_M2}
GFR SERPL CREATININE-BSD FRML MDRD: ABNORMAL ML/MIN/{1.73_M2}
GLUCOSE BLD-MCNC: 99 MG/DL (ref 70–99)
HCT VFR BLD CALC: 44 % (ref 40.7–50.3)
HEMOGLOBIN: 14.9 G/DL (ref 13–17)
MCH RBC QN AUTO: 28.4 PG (ref 25.2–33.5)
MCHC RBC AUTO-ENTMCNC: 33.9 G/DL (ref 28.4–34.8)
MCV RBC AUTO: 83.8 FL (ref 82.6–102.9)
NRBC AUTOMATED: 0 PER 100 WBC
PARTIAL THROMBOPLASTIN TIME: 27.4 SEC (ref 20.5–30.5)
PARTIAL THROMBOPLASTIN TIME: 30.2 SEC (ref 20.5–30.5)
PARTIAL THROMBOPLASTIN TIME: 40.1 SEC (ref 20.5–30.5)
PDW BLD-RTO: 14.4 % (ref 11.8–14.4)
PLATELET # BLD: 146 K/UL (ref 138–453)
PMV BLD AUTO: 11 FL (ref 8.1–13.5)
POTASSIUM SERPL-SCNC: 4.4 MMOL/L (ref 3.7–5.3)
RBC # BLD: 5.25 M/UL (ref 4.21–5.77)
SODIUM BLD-SCNC: 134 MMOL/L (ref 135–144)
TSH SERPL DL<=0.05 MIU/L-ACNC: 1.47 MIU/L (ref 0.3–5)
WBC # BLD: 7.9 K/UL (ref 3.5–11.3)

## 2022-02-01 PROCEDURE — 85027 COMPLETE CBC AUTOMATED: CPT

## 2022-02-01 PROCEDURE — 6360000004 HC RX CONTRAST MEDICATION: Performed by: STUDENT IN AN ORGANIZED HEALTH CARE EDUCATION/TRAINING PROGRAM

## 2022-02-01 PROCEDURE — 2060000000 HC ICU INTERMEDIATE R&B

## 2022-02-01 PROCEDURE — C9113 INJ PANTOPRAZOLE SODIUM, VIA: HCPCS | Performed by: STUDENT IN AN ORGANIZED HEALTH CARE EDUCATION/TRAINING PROGRAM

## 2022-02-01 PROCEDURE — 6360000002 HC RX W HCPCS

## 2022-02-01 PROCEDURE — 2580000003 HC RX 258: Performed by: STUDENT IN AN ORGANIZED HEALTH CARE EDUCATION/TRAINING PROGRAM

## 2022-02-01 PROCEDURE — 36415 COLL VENOUS BLD VENIPUNCTURE: CPT

## 2022-02-01 PROCEDURE — 84443 ASSAY THYROID STIM HORMONE: CPT

## 2022-02-01 PROCEDURE — 74250 X-RAY XM SM INT 1CNTRST STD: CPT

## 2022-02-01 PROCEDURE — 6360000002 HC RX W HCPCS: Performed by: STUDENT IN AN ORGANIZED HEALTH CARE EDUCATION/TRAINING PROGRAM

## 2022-02-01 PROCEDURE — 85730 THROMBOPLASTIN TIME PARTIAL: CPT

## 2022-02-01 PROCEDURE — 80048 BASIC METABOLIC PNL TOTAL CA: CPT

## 2022-02-01 PROCEDURE — 99223 1ST HOSP IP/OBS HIGH 75: CPT | Performed by: INTERNAL MEDICINE

## 2022-02-01 RX ORDER — ATORVASTATIN CALCIUM 20 MG/1
20 TABLET, FILM COATED ORAL DAILY
Status: DISCONTINUED | OUTPATIENT
Start: 2022-02-01 | End: 2022-02-02 | Stop reason: HOSPADM

## 2022-02-01 RX ORDER — SODIUM CHLORIDE 9 MG/ML
25 INJECTION, SOLUTION INTRAVENOUS PRN
Status: DISCONTINUED | OUTPATIENT
Start: 2022-02-01 | End: 2022-02-02 | Stop reason: HOSPADM

## 2022-02-01 RX ORDER — ONDANSETRON 4 MG/1
4 TABLET, ORALLY DISINTEGRATING ORAL EVERY 8 HOURS PRN
Status: DISCONTINUED | OUTPATIENT
Start: 2022-02-01 | End: 2022-02-02 | Stop reason: HOSPADM

## 2022-02-01 RX ORDER — ACETAMINOPHEN 650 MG/1
650 SUPPOSITORY RECTAL EVERY 6 HOURS PRN
Status: DISCONTINUED | OUTPATIENT
Start: 2022-02-01 | End: 2022-02-02 | Stop reason: HOSPADM

## 2022-02-01 RX ORDER — POLYETHYLENE GLYCOL 3350 17 G/17G
17 POWDER, FOR SOLUTION ORAL DAILY PRN
Status: DISCONTINUED | OUTPATIENT
Start: 2022-02-01 | End: 2022-02-02 | Stop reason: HOSPADM

## 2022-02-01 RX ORDER — LORAZEPAM 2 MG/ML
0.5 INJECTION INTRAMUSCULAR ONCE
Status: COMPLETED | OUTPATIENT
Start: 2022-02-01 | End: 2022-02-01

## 2022-02-01 RX ORDER — SODIUM CHLORIDE 0.9 % (FLUSH) 0.9 %
5-40 SYRINGE (ML) INJECTION PRN
Status: DISCONTINUED | OUTPATIENT
Start: 2022-02-01 | End: 2022-02-02 | Stop reason: HOSPADM

## 2022-02-01 RX ORDER — POTASSIUM CHLORIDE 7.45 MG/ML
10 INJECTION INTRAVENOUS PRN
Status: DISCONTINUED | OUTPATIENT
Start: 2022-02-01 | End: 2022-02-02 | Stop reason: HOSPADM

## 2022-02-01 RX ORDER — HEPARIN SODIUM 1000 [USP'U]/ML
4000 INJECTION, SOLUTION INTRAVENOUS; SUBCUTANEOUS PRN
Status: DISCONTINUED | OUTPATIENT
Start: 2022-02-01 | End: 2022-02-02

## 2022-02-01 RX ORDER — PANTOPRAZOLE SODIUM 40 MG/10ML
40 INJECTION, POWDER, LYOPHILIZED, FOR SOLUTION INTRAVENOUS DAILY
Status: DISCONTINUED | OUTPATIENT
Start: 2022-02-01 | End: 2022-02-02 | Stop reason: HOSPADM

## 2022-02-01 RX ORDER — SODIUM CHLORIDE 9 MG/ML
INJECTION, SOLUTION INTRAVENOUS CONTINUOUS
Status: DISCONTINUED | OUTPATIENT
Start: 2022-02-01 | End: 2022-02-02

## 2022-02-01 RX ORDER — HEPARIN SODIUM 1000 [USP'U]/ML
2000 INJECTION, SOLUTION INTRAVENOUS; SUBCUTANEOUS PRN
Status: DISCONTINUED | OUTPATIENT
Start: 2022-02-01 | End: 2022-02-02

## 2022-02-01 RX ORDER — ONDANSETRON 2 MG/ML
4 INJECTION INTRAMUSCULAR; INTRAVENOUS EVERY 6 HOURS PRN
Status: DISCONTINUED | OUTPATIENT
Start: 2022-02-01 | End: 2022-02-02 | Stop reason: HOSPADM

## 2022-02-01 RX ORDER — SODIUM CHLORIDE 0.9 % (FLUSH) 0.9 %
5-40 SYRINGE (ML) INJECTION EVERY 12 HOURS SCHEDULED
Status: DISCONTINUED | OUTPATIENT
Start: 2022-02-01 | End: 2022-02-02 | Stop reason: HOSPADM

## 2022-02-01 RX ORDER — HEPARIN SODIUM 1000 [USP'U]/ML
4000 INJECTION, SOLUTION INTRAVENOUS; SUBCUTANEOUS ONCE
Status: COMPLETED | OUTPATIENT
Start: 2022-02-01 | End: 2022-02-01

## 2022-02-01 RX ORDER — MORPHINE SULFATE 2 MG/ML
2 INJECTION, SOLUTION INTRAMUSCULAR; INTRAVENOUS EVERY 4 HOURS PRN
Status: DISCONTINUED | OUTPATIENT
Start: 2022-02-01 | End: 2022-02-02 | Stop reason: HOSPADM

## 2022-02-01 RX ORDER — ACETAMINOPHEN 325 MG/1
650 TABLET ORAL EVERY 6 HOURS PRN
Status: DISCONTINUED | OUTPATIENT
Start: 2022-02-01 | End: 2022-02-02 | Stop reason: HOSPADM

## 2022-02-01 RX ORDER — MAGNESIUM SULFATE IN WATER 40 MG/ML
2000 INJECTION, SOLUTION INTRAVENOUS PRN
Status: DISCONTINUED | OUTPATIENT
Start: 2022-02-01 | End: 2022-02-02 | Stop reason: HOSPADM

## 2022-02-01 RX ORDER — HEPARIN SODIUM 10000 [USP'U]/100ML
5-30 INJECTION, SOLUTION INTRAVENOUS CONTINUOUS
Status: DISCONTINUED | OUTPATIENT
Start: 2022-02-01 | End: 2022-02-02

## 2022-02-01 RX ORDER — SODIUM CHLORIDE 9 MG/ML
10 INJECTION INTRAVENOUS DAILY
Status: DISCONTINUED | OUTPATIENT
Start: 2022-02-01 | End: 2022-02-02 | Stop reason: HOSPADM

## 2022-02-01 RX ADMIN — HEPARIN SODIUM 2000 UNITS: 1000 INJECTION INTRAVENOUS; SUBCUTANEOUS at 21:59

## 2022-02-01 RX ADMIN — MORPHINE SULFATE 2 MG: 2 INJECTION, SOLUTION INTRAMUSCULAR; INTRAVENOUS at 08:58

## 2022-02-01 RX ADMIN — SODIUM CHLORIDE: 9 INJECTION, SOLUTION INTRAVENOUS at 01:57

## 2022-02-01 RX ADMIN — IOHEXOL 300 ML: 240 INJECTION, SOLUTION INTRATHECAL; INTRAVASCULAR; INTRAVENOUS; ORAL at 09:45

## 2022-02-01 RX ADMIN — HEPARIN SODIUM 7.11 UNITS/KG/HR: 5000 INJECTION INTRAVENOUS; SUBCUTANEOUS at 04:08

## 2022-02-01 RX ADMIN — SODIUM CHLORIDE, PRESERVATIVE FREE 10 ML: 5 INJECTION INTRAVENOUS at 08:42

## 2022-02-01 RX ADMIN — LORAZEPAM 0.5 MG: 2 INJECTION INTRAMUSCULAR; INTRAVENOUS at 04:07

## 2022-02-01 RX ADMIN — MORPHINE SULFATE 2 MG: 2 INJECTION, SOLUTION INTRAMUSCULAR; INTRAVENOUS at 21:49

## 2022-02-01 RX ADMIN — HEPARIN SODIUM 2000 UNITS: 1000 INJECTION INTRAVENOUS; SUBCUTANEOUS at 13:28

## 2022-02-01 RX ADMIN — SODIUM CHLORIDE, PRESERVATIVE FREE 10 ML: 5 INJECTION INTRAVENOUS at 21:48

## 2022-02-01 RX ADMIN — PANTOPRAZOLE SODIUM 40 MG: 40 INJECTION, POWDER, FOR SOLUTION INTRAVENOUS at 08:42

## 2022-02-01 RX ADMIN — ONDANSETRON 4 MG: 2 INJECTION INTRAMUSCULAR; INTRAVENOUS at 13:28

## 2022-02-01 RX ADMIN — HEPARIN SODIUM 4000 UNITS: 1000 INJECTION INTRAVENOUS; SUBCUTANEOUS at 04:08

## 2022-02-01 ASSESSMENT — ENCOUNTER SYMPTOMS
CONSTIPATION: 1
VOMITING: 0
CHEST TIGHTNESS: 0
ABDOMINAL PAIN: 1
SHORTNESS OF BREATH: 0
RESPIRATORY NEGATIVE: 1
BLOOD IN STOOL: 0
COUGH: 0
NAUSEA: 1
EYES NEGATIVE: 1

## 2022-02-01 ASSESSMENT — PAIN SCALES - GENERAL
PAINLEVEL_OUTOF10: 7
PAINLEVEL_OUTOF10: 0
PAINLEVEL_OUTOF10: 0
PAINLEVEL_OUTOF10: 4

## 2022-02-01 NOTE — H&P
General Surgery  H&P    PATIENT NAME: Bharath Corona  AGE: 78 y.o. MEDICAL RECORD NO. [id number]  DATE: 1/31/2022  SURGEON: Jeannine Langston  PRIMARY CARE PHYSICIAN: Gregorio Cheng    Patient evaluated at the request of  Dr. Sheryl Edwards  Reason for evaluation: SBO    Patient information was obtained from patient. History/Exam limitations: none. Patient presented to the Emergency Department by private vehicle. IMPRESSION:     Patient Active Problem List   Diagnosis    Chest pain    Paroxysmal atrial fibrillation (HCC)    Acute vasomotor rhinitis    Adenocarcinoma of prostate ([address]    Adiposity    Cardiomyopathy ([address]    Dermatitis    Eczema    Erectile dysfunction following radical prostatectomy    Fear of flying    GERD (gastroesophageal reflux disease)    Herpes simplex type 1 infection    Hyperlipidemia    Hypertension    Incisional hernia, without obstruction or gangrene    Intestinal volvulus (HCC)    Obstructive sleep apnea syndrome    Osteoarthritis of knee    Overactive bladder    Strain of lumbar region    Ventral incisional hernia    Viral upper respiratory tract infection    Atrial fibrillation with RVR (HCC)    S/P prostatectomy           PLAN:   Admit to medicine   NPO  NG to LIWS  Will continue to monitor. Non-op management at this time. If patient clinical status deteriorates will plan for operative exploration. HISTORY:   History of Chief Complaint:    Bharath Corona is a 78 y.o. male who presents with abdominal pain since noon today. Patient tolerated breakfast, had BMx2 this AM. Around noon pain started and since that time has not passed flatus, had some nausea that has improved. No emesis. Patient denies any abd pain at this time. CT with evidence of SBO related to ventral hernia. No skin changes, nontender on exam. Labs WNL. Patient was recently discharged on 1/25 for afib. Presented with palpitations at that time.  He was restarted on metoprolol, transitioned from Eliquis to heparin to warfarin. Denies any recent use of steroids. Surgical history includes robotic prostatectomy, ex-lap for SBO ~5 years ago, ventral hernia repair. Past Medical History   has a past medical history of Adiposity, Atrial fibrillation and flutter ([address] Cancer ([facility] Chronic kidney disease, Fatigue, Fear of flying, GERD (gastroesophageal reflux disease), Hyperlipidemia, Hypertension, Osteoarthritis of knee, Overactive bladder, Palpitations, and Sleep apnea. Past Surgical History   has a past surgical history that includes Kidney stone surgery (y-4); hernia repair; joint replacement; Cardioversion; Cardiac surgery; and ablation of dysrhythmic focus (8/13/15). Medications  Prior to Admission medications    Medication Sig Start Date End Date Taking? Authorizing Provider   apixaban (ELIQUIS) 5 MG TABS tablet Take 1 tablet by mouth 2 times daily 1/24/22   Isaak Lr MD   atorvastatin (LIPITOR) 20 MG tablet Take 20 mg by mouth 2/21/18   Historical Provider, MD   predniSONE (DELTASONE) 20 MG tablet TAKE 1 TABLET BY MOUTH THREE TIMES DAILY FOR 3 DAYS THEN 1,TABLET. ..  (REFER TO PRESCRIPTION NOTES). 4/17/17   Historical Provider, MD   ibuprofen (ADVIL;MOTRIN) 400 MG tablet Take 1 tablet by mouth 3 times daily (with meals) 8/16/15   Suellen Franklin MD   pantoprazole (PROTONIX) 40 MG tablet   daily  4/16/15   Historical Provider, MD   VESICARE 5 MG tablet   daily  4/16/15   Historical Provider, MD   tamsulosin (FLOMAX) 0.4 MG capsule   daily  4/22/15   Historical Provider, MD    Scheduled Meds:   betamethasone acetate-betamethasone sodium phosphate  3 mg Intra-LESional Once     Continuous Infusions:   PRN Meds:. Allergies  has No Known Allergies. Family History  family history includes Colon Cancer in his mother; Heart Disease in his father. Social History   reports that he has quit smoking. He has never used smokeless tobacco.   reports no history of alcohol use. reports no history of drug use.   Review of Systems  General Denies any fever or chills  HEENT  Denies any diplopia, tinnitus or vertigo  Resp Denies any shortness of breath, cough or wheezing  Cardiac Denies any chest pain, palpitations, claudication or edema  GI Denies any melena, hematochezia, hematemesis or pyrosis   Denies any frequency, urgency, hesitancy or incontinence  Heme Denies bruising or bleeding easily  Endocrine Denies any history of diabetes or thyroid disease  Neuro Denies any focal motor or sensory deficits    PHYSICAL:   VITALS:  height is 6' 3\" (1.905 m) and weight is 310 lb (140.6 kg) (abnormal). His oral temperature is 97.7 °F (36.5 °C). His blood pressure is 171/97 (abnormal) and his pulse is 66. His respiration is 18 and oxygen saturation is 94%. CONSTITUTIONAL: Alert and oriented times 3, no acute distress and cooperative to examination. HEENT: Head is normocephalic, atraumatic. EOMI, PERRLA  NECK: Soft, trachea midline and straight  LUNGS: Chest expands equally bilaterally upon respiration  CARDIOVASCULAR: Heart sounds are normal.  Regular rate and rhythm without murmur, gallop or rub. ABDOMEN: soft, nontender, obese, no masses or organomegaly, no hernias palpable, no guarding or peritoneal signs. Scars are consistent with previous surgical history. NEUROLOGIC: CN II-XII are grossly intact.  There are no focalizing motor or sensory deficits  EXTREMITIES: no cyanosis, clubbing or edema    LABS:     Recent Labs     01/31/22 1749 01/31/22  1845   WBC 9.4  --    HGB 15.5  --    HCT 46.0  --      --      --    K 4.1  --      --    CO2 23  --    BUN 15  --    CREATININE 1.02  --    CALCIUM 9.6  --    AST 11  --    ALT 17  --    BILITOT 0.74  --    NITRU  --  NEGATIVE   COLORU  --  Yellow     Recent Labs     01/31/22 1749   ALKPHOS 80   ALT 17   AST 11   BILITOT 0.74   LABALBU 4.4   LIPASE 22     CBC:   Lab Results   Component Value Date    WBC 9.4 01/31/2022    RBC 5.51 01/31/2022    HGB 15.5 01/31/2022    HCT 46.0 01/31/2022    MCV 83.5 01/31/2022    MCH 28.1 01/31/2022    MCHC 33.7 01/31/2022    RDW 14.4 01/31/2022     01/31/2022    MPV 10.7 01/31/2022     BMP:    Lab Results   Component Value Date     01/31/2022    K 4.1 01/31/2022     01/31/2022    CO2 23 01/31/2022    BUN 15 01/31/2022    LABALBU 4.4 01/31/2022    CREATININE 1.02 01/31/2022    CALCIUM 9.6 01/31/2022    GFRAA >60 01/31/2022    LABGLOM >60 01/31/2022    GLUCOSE 88 01/31/2022       RADIOLOGY:   CT scan abd/pelvis:    Acute small bowel obstruction due to a focal periumbilical ventral abdominal   wall hernia just left of midline. Sigmoid diverticulosis. Small hiatal hernia. Nonobstructing bilateral renal calculi. Thank you for the interesting evaluation. Further recommendations to follow. Sue Sanz DO  1/31/2022, 9:17 PM      Attending Note      I have reviewed the above [facility] note(s) and I either performed the key elements of the medical history and physical exam or was present with the surgery resident when the key elements of the medical history and physical exam were performed. I have discussed the findings, established the care plan and recommendations with the surgical team.  Recent start coumadin for afib. Denies pain, states passing flatus. Possible SBFT, clarify anticoag. Possible surgery.     Dariana Lindsey MD  2/1/2022  7:31 AM

## 2022-02-01 NOTE — FLOWSHEET NOTE
Assessment: Patient is a 78 y.o. male who arrived to the ED due to a \"bowel obstruction. \" Patient was sitting up in hospital bed, watching TV, when  visited. Intervention:  visited patient per initial rounding visits in the ED.  introduced herself to patient and learned about his well-being. Per patient, his spouse was present in the ED with him earlier, however, he \"sent her home. \" Patient is coping well and is \"not in too much pain. \" Patient thanked  for visit and care. Outcome: Patient was receptive of 's visit and support. Plan: Chaplains can make follow-up visit, per request. Yonathan Titus can be reached 24/7 via [employer]. Jorge L Gillespie     02/01/22 0039   Encounter Summary   Services provided to: Patient   Referral/Consult From: Rounding  (ED Rounding)   Support System Spouse   Continue Visiting   (1/31/2022)   Complexity of Encounter Low   Length of Encounter 15 minutes   Spiritual Assessment Completed Yes   Routine   Type Initial   Spiritual/Hinduism   Type Spiritual support   Assessment Calm; Approachable; Hopeful;Coping   Intervention Sustaining presence/ Ministry of presence   Outcome Receptive

## 2022-02-01 NOTE — PROGRESS NOTES
Physician Progress Note      Dorothea Lowe  CSN #:                  [id number]  :                       1955  ADMIT DATE:       2022 12:14 PM  [address] DATE:        2022 11:32 AM  RESPONDING  PROVIDER #:        Marko Morse          QUERY TEXT:    Patient admitted with Afib and is maintained on Eliquis. If possible, please   document in progress notes and discharge summary if you are evaluating and/or   treating any of the following: The medical record reflects the following:  Risk Factors:  78year old male with hypertension  Clinical Indicators: paroxysmal Afib, hx Afib with ablation 6-7 years ago   \"patient was not on any anticoagulation after the ablation\"  Treatment: started on Eliquis, heparin and cardizem gtts, echo, telemetry,   labs, monitoring    Call if any questions. Thank you, Armida Saucedo, [address] [place] [address]  Options provided:  -- Secondary hypercoagulable state in a patient with atrial fibrillation  -- Other - I will add my own diagnosis  -- Disagree - Not applicable / Not valid  -- Disagree - Clinically unable to determine / Unknown  -- Refer to Clinical Documentation Reviewer    PROVIDER RESPONSE TEXT:    Patient was admitted for Atrial Fibrillation. Was discharged on Eliquis.     Query created by: Bruna Irving on 2022 9:42 AM      Electronically signed by:  Marko Morse 2022 2:32 AM

## 2022-02-01 NOTE — PROGRESS NOTES
Physical Therapy        Physical Therapy Cancel Note      DATE: 2022    NAME: Natividad Mckeon  MRN: [id number]   : 1955      Patient not seen this date for Physical Therapy due to: Other: Pt out of room at small bowel follow through. Will check back later as time permits.       Electronically signed by Natty Aguayo PT on 2022 at 9:14 AM

## 2022-02-01 NOTE — ED NOTES
Pt resting on stretcher. RR even and unlabored. Provided a warm blanket. Denies any other needs. Call light within reach. Wife at bedside.      Abisai Tilley RN  01/31/22 1956

## 2022-02-01 NOTE — PLAN OF CARE
Problem: Falls - Risk of:  Goal: Will remain free from falls  Description: Will remain free from falls  2/1/2022 1042 by Bill Avina RN  Outcome: Ongoing  2/1/2022 0513 by Alice Fuentes RN  Outcome: Ongoing  Goal: Absence of physical injury  Description: Absence of physical injury  2/1/2022 1042 by Bill Avina RN  Outcome: Ongoing  2/1/2022 0513 by Alice Fuentes RN  Outcome: Ongoing     Problem: Pain:  Goal: Pain level will decrease  Description: Pain level will decrease  2/1/2022 1042 by Bill Avina RN  Outcome: Ongoing  2/1/2022 0513 by Alice Fuentes RN  Outcome: Ongoing  Goal: Control of acute pain  Description: Control of acute pain  2/1/2022 1042 by iBll Avina RN  Outcome: Ongoing  2/1/2022 0513 by Alice Fuentes RN  Outcome: Ongoing  Goal: Control of chronic pain  Description: Control of chronic pain  2/1/2022 1042 by Bill Avina RN  Outcome: Ongoing  2/1/2022 0513 by Alice Fuentes RN  Outcome: Ongoing     Problem: Fluid Volume - Imbalance:  Goal: Absence of imbalanced fluid volume signs and symptoms  Description: Absence of imbalanced fluid volume signs and symptoms  2/1/2022 1042 by Bill Avina RN  Outcome: Ongoing  2/1/2022 0513 by Alice Fuentes RN  Outcome: Ongoing     Problem: Activity:  Goal: Risk for activity intolerance will decrease  Description: Risk for activity intolerance will decrease  2/1/2022 1042 by Bill Avina RN  Outcome: Ongoing  2/1/2022 0513 by Alice Fuentes RN  Outcome: Ongoing     Problem:  Bowel/Gastric:  Goal: Bowel function will improve  Description: Bowel function will improve  2/1/2022 1042 by Bill Avina RN  Outcome: Ongoing  2/1/2022 0513 by Alice Fuentes RN  Outcome: Ongoing  Goal: Diagnostic test results will improve  Description: Diagnostic test results will improve  2/1/2022 1042 by Bill Avina RN  Outcome: Ongoing  2/1/2022 0513 by Alice Fuentes RN  Outcome: Ongoing  Goal: Occurrences of nausea will decrease  Description: Occurrences of nausea will decrease  2/1/2022 1042 by Jero Campbell RN  Outcome: Ongoing  2/1/2022 0513 by Mariya Persaud RN  Outcome: Ongoing  Goal: Occurrences of vomiting will decrease  Description: Occurrences of vomiting will decrease  2/1/2022 1042 by Jero Campbell RN  Outcome: Ongoing  2/1/2022 0513 by Mariya Persaud RN  Outcome: Ongoing     Problem: Fluid Volume:  Goal: Maintenance of adequate hydration will improve  Description: Maintenance of adequate hydration will improve  2/1/2022 1042 by Jero Campbell RN  Outcome: Ongoing  2/1/2022 0513 by Mariya Persaud RN  Outcome: Ongoing     Problem: Health Behavior:  Goal: Ability to state signs and symptoms to report to health care provider will improve  Description: Ability to state signs and symptoms to report to health care provider will improve  2/1/2022 1042 by Jero Campbell RN  Outcome: Ongoing  2/1/2022 0513 by Mariya Persaud RN  Outcome: Ongoing     Problem: Physical Regulation:  Goal: Complications related to the disease process, condition or treatment will be avoided or minimized  Description: Complications related to the disease process, condition or treatment will be avoided or minimized  2/1/2022 1042 by Jero Campbell RN  Outcome: Ongoing  2/1/2022 0513 by Mariya Persaud RN  Outcome: Ongoing  Goal: Ability to maintain clinical measurements within normal limits will improve  Description: Ability to maintain clinical measurements within normal limits will improve  2/1/2022 1042 by Jero Campbell RN  Outcome: Ongoing  2/1/2022 0513 by Mariya Persaud RN  Outcome: Ongoing     Problem: Sensory:  Goal: Pain level will decrease  Description: Pain level will decrease  2/1/2022 1042 by Jero Campbell RN  Outcome: Ongoing  2/1/2022 0513 by Mariya Persaud RN  Outcome: Ongoing  Goal: Ability to identify factors that increase the pain will improve  Description: Ability to identify factors that increase the pain will improve  2/1/2022 1042 by Jero Campbell RN  Outcome: Ongoing  2/1/2022 0513 by Ankit Alatorre RN  Outcome: Ongoing  Goal: Ability to notify healthcare provider of pain before it becomes unmanageable or unbearable will improve  Description: Ability to notify healthcare provider of pain before it becomes unmanageable or unbearable will improve  2/1/2022 1042 by Court Huang RN  Outcome: Ongoing  2/1/2022 0513 by Ankit Alatorre RN  Outcome: Ongoing

## 2022-02-01 NOTE — PLAN OF CARE
Problem: Falls - Risk of:  Goal: Will remain free from falls  Description: Will remain free from falls  Outcome: Ongoing  Goal: Absence of physical injury  Description: Absence of physical injury  Outcome: Ongoing     Problem: Pain:  Goal: Pain level will decrease  Description: Pain level will decrease  Outcome: Ongoing  Goal: Control of acute pain  Description: Control of acute pain  Outcome: Ongoing  Goal: Control of chronic pain  Description: Control of chronic pain  Outcome: Ongoing     Problem: Fluid Volume - Imbalance:  Goal: Absence of imbalanced fluid volume signs and symptoms  Description: Absence of imbalanced fluid volume signs and symptoms  Outcome: Ongoing     Problem: Activity:  Goal: Risk for activity intolerance will decrease  Description: Risk for activity intolerance will decrease  Outcome: Ongoing     Problem:  Bowel/Gastric:  Goal: Bowel function will improve  Description: Bowel function will improve  Outcome: Ongoing  Goal: Diagnostic test results will improve  Description: Diagnostic test results will improve  Outcome: Ongoing  Goal: Occurrences of nausea will decrease  Description: Occurrences of nausea will decrease  Outcome: Ongoing  Goal: Occurrences of vomiting will decrease  Description: Occurrences of vomiting will decrease  Outcome: Ongoing     Problem: Fluid Volume:  Goal: Maintenance of adequate hydration will improve  Description: Maintenance of adequate hydration will improve  Outcome: Ongoing     Problem: Health Behavior:  Goal: Ability to state signs and symptoms to report to health care provider will improve  Description: Ability to state signs and symptoms to report to health care provider will improve  Outcome: Ongoing     Problem: Physical Regulation:  Goal: Complications related to the disease process, condition or treatment will be avoided or minimized  Description: Complications related to the disease process, condition or treatment will be avoided or minimized  Outcome: Ongoing  Goal: Ability to maintain clinical measurements within normal limits will improve  Description: Ability to maintain clinical measurements within normal limits will improve  Outcome: Ongoing     Problem: Sensory:  Goal: Pain level will decrease  Description: Pain level will decrease  Outcome: Ongoing  Goal: Ability to identify factors that increase the pain will improve  Description: Ability to identify factors that increase the pain will improve  Outcome: Ongoing  Goal: Ability to notify healthcare provider of pain before it becomes unmanageable or unbearable will improve  Description: Ability to notify healthcare provider of pain before it becomes unmanageable or unbearable will improve  Outcome: Ongoing

## 2022-02-01 NOTE — H&P
[facility]     Department of Internal Medicine - Staff Internal Medicine Teaching Service          ADMISSION NOTE/HISTORY AND PHYSICAL EXAMINATION   Date: 2/1/2022  Patient Name: Pilo Newsome  Date of admission: 1/31/2022  5:27 PM  YOB: 1955  PCP: Kait Chu  History Obtained From:  patient, electronic medical record    CHIEF COMPLAINT     Chief complaint: Right lower quadrant abdominal pain    HISTORY OF PRESENTING ILLNESS     The patient is a pleasant 78 y.o. male presents with a chief complaint of right lower quadrant abdominal pain which began few hours prior to admission and is progressively getting worse. Patient reported that he had 2 bowel movements since morning however was unable to pass any flatus after that. Patient also reported some nausea but no vomiting. Denies any improvement in abdominal pain after bowel movements. Patient reports being constipated for last couple of his. Patient was initially presented to urgent care from where he was sent to the ER. Patient has history of bowel obstruction. Patient's past medical history includes  · Hypertension  · GERD  · A. fib with RVR s/p ablation 6/7 years ago, currently on Cardizem and warfarin  · Prostate cancer s/p prostatectomy 2015. Last Echo-1/23/2022- EF 55%, mild LVH, right atrial dilatation. Last Admission: Patient was discharged on 1/25/2022 when he was admitted with palpitations and found to be in A. fib with RVR. Patient has history of A. fib s/p ablation 6-7 years ago. Patient was not on any anticoagulation after ablation. During the last admission when patient was admitted for A. fib, patient underwent cardioversion 2 times and was an normal sinus rhythm. Patient was also started on Eliquis. Cardiac work-up prior to discharge included echo which showed preserved EF, no wall motion abnormality or valvular disease.   However due to cost issue, patient's Eliquis was switched to warfarin by his primary cardiologist.     ED Course:  Vitals:    02/01/22 0145   BP: (!) 144/86   Pulse: 77   Resp: 18   Temp: 98.1 °F (36.7 °C)   SpO2: 97%     Patient's labs were unremarkable. CT abdomen done in the ER showed acute small bowel obstruction due to focal periumbilical ventral abdominal wall hernia just left of midline, sigmoid diverticulosis, small hiatal hernia, nonobstructing bilateral renal calculi. GEN surge was consulted in the ER, recommended conservative management. Patient had NG placed. Patient was admitted under medicine for history of A. fib and for medical management. Review of Systems:  Review of Systems   Constitutional: Negative. HENT: Negative. Eyes: Negative. Respiratory: Negative. Negative for cough, chest tightness and shortness of breath. Cardiovascular: Negative. Negative for chest pain and palpitations. Gastrointestinal: Positive for abdominal pain, constipation and nausea. Negative for blood in stool and vomiting. Endocrine: Negative. Genitourinary: Negative for decreased urine volume, difficulty urinating, dysuria, flank pain, hematuria and urgency. Musculoskeletal: Negative. Skin: Negative. Neurological: Negative. Hematological: Negative. Psychiatric/Behavioral: Negative.       PAST MEDICAL HISTORY     Past Medical History:   Diagnosis Date    Adiposity 1/20/2017    Atrial fibrillation and flutter ([address]     Initially DX 2010    Cancer ([facility] 8-15    Prostate Cancer, having OR in 10-15    Chronic kidney disease     Fatigue     Fear of flying 1/20/2017    GERD (gastroesophageal reflux disease)     Hyperlipidemia     Hypertension     Osteoarthritis of knee 1/20/2017    Overactive bladder 12/19/2016    Palpitations     Seasonal allergic rhinitis due to pollen 6/25/2020    Sleep apnea     uses C-pap       PAST SURGICAL HISTORY     Past Surgical History:   Procedure Laterality Date    ABLATION OF DYSRHYTHMIC FOCUS  8/13/15  CARDIAC SURGERY      ablation 2015    CARDIOVERSION       and 7-15    HERNIA REPAIR      JOINT REPLACEMENT      Bilateral knees    KIDNEY STONE SURGERY  y-4       ALLERGIES     Patient has no known allergies. MEDICATIONS PRIOR TO ADMISSION     Prior to Admission medications    Medication Sig Start Date End Date Taking? Authorizing Provider   apixaban (ELIQUIS) 5 MG TABS tablet Take 1 tablet by mouth 2 times daily 22   Paige Ibarra MD   atorvastatin (LIPITOR) 20 MG tablet Take 20 mg by mouth 18   Historical Provider, MD   predniSONE (DELTASONE) 20 MG tablet TAKE 1 TABLET BY MOUTH THREE TIMES DAILY FOR 3 DAYS THEN 1,TABLET. ..  (REFER TO PRESCRIPTION NOTES). 17   Historical Provider, MD   ibuprofen (ADVIL;MOTRIN) 400 MG tablet Take 1 tablet by mouth 3 times daily (with meals) 8/16/15   Romaine Ashton MD   pantoprazole (PROTONIX) 40 MG tablet   daily  4/16/15   Historical Provider, MD   VESICARE 5 MG tablet   daily  4/16/15   Historical Provider, MD   tamsulosin [facility]) 0.4 MG capsule   daily  4/22/15   Historical Provider, MD       SOCIAL HISTORY     Tobacco: Former smoker  Alcohol: No  Illicits: No  Occupation: Unknown    FAMILY HISTORY     Family History   Problem Relation Age of Onset    Colon Cancer Mother     Heart Disease Father        PHYSICAL EXAM     Vitals: BP (!) 144/86   Pulse 77   Temp 98.1 °F (36.7 °C) (Axillary)   Resp 18   Ht 6' 3\" (1.905 m)   Wt (!) 310 lb (140.6 kg)   SpO2 97%   BMI 38.75 kg/m²   Tmax: Temp (24hrs), Av.9 °F (36.6 °C), Min:97.7 °F (36.5 °C), Max:98.1 °F (36.7 °C)    Last Body weight:   Wt Readings from Last 3 Encounters:   22 (!) 310 lb (140.6 kg)   22 (!) 316 lb (143.3 kg)   18 297 lb (134.7 kg)     Body Mass Index : Body mass index is 38.75 kg/m². PHYSICAL EXAMINATION:  Physical Exam  Constitutional:       General: He is not in acute distress. Appearance: He is obese.    HENT:      Head: Normocephalic and atraumatic. Nose: Nose normal.      Mouth/Throat:      Mouth: Mucous membranes are moist.      Pharynx: Oropharynx is clear. Eyes:      Extraocular Movements: Extraocular movements intact. Pupils: Pupils are equal, round, and reactive to light. Abdominal:      Tenderness: There is abdominal tenderness. Hernia: A hernia is present. Musculoskeletal:         General: Normal range of motion. Right lower leg: No edema. Left lower leg: No edema. Skin:     General: Skin is warm and dry. Neurological:      General: No focal deficit present. Mental Status: He is alert. Mental status is at baseline.        INVESTIGATIONS     Laboratory Testing:     Recent Results (from the past 24 hour(s))   LIPASE    Collection Time: 01/31/22  5:49 PM   Result Value Ref Range    Lipase 22 13 - 60 U/L   Lactic Acid, Plasma    Collection Time: 01/31/22  5:49 PM   Result Value Ref Range    Lactic Acid NOT REPORTED mmol/L    Lactic Acid, Whole Blood 1.3 0.7 - 2.1 mmol/L   CBC WITH AUTO DIFFERENTIAL    Collection Time: 01/31/22  5:49 PM   Result Value Ref Range    WBC 9.4 3.5 - 11.3 k/uL    RBC 5.51 4.21 - 5.77 m/uL    Hemoglobin 15.5 13.0 - 17.0 g/dL    Hematocrit 46.0 40.7 - 50.3 %    MCV 83.5 82.6 - 102.9 fL    MCH 28.1 25.2 - 33.5 pg    MCHC 33.7 28.4 - 34.8 g/dL    RDW 14.4 11.8 - 14.4 %    Platelets 528 642 - 281 k/uL    MPV 10.7 8.1 - 13.5 fL    NRBC Automated 0.0 0.0 per 100 WBC    Differential Type NOT REPORTED     WBC Morphology NOT REPORTED     RBC Morphology NOT REPORTED     Platelet Estimate NOT REPORTED     Seg Neutrophils 75 (H) 36 - 65 %    Lymphocytes 16 (L) 24 - 43 %    Monocytes 6 3 - 12 %    Eosinophils % 2 1 - 4 %    Basophils 1 0 - 2 %    Immature Granulocytes 0 0 %    Segs Absolute 7.01 1.50 - 8.10 k/uL    Absolute Lymph # 1.48 1.10 - 3.70 k/uL    Absolute Mono # 0.60 0.10 - 1.20 k/uL    Absolute Eos # 0.20 0.00 - 0.44 k/uL    Basophils Absolute 0.07 0.00 - 0.20 k/uL    Absolute Immature Granulocyte 0.03 0.00 - 0.30 k/uL   COMPREHENSIVE METABOLIC PANEL    Collection Time: 01/31/22  5:49 PM   Result Value Ref Range    Glucose 88 70 - 99 mg/dL    BUN 15 8 - 23 mg/dL    CREATININE 1.02 0.70 - 1.20 mg/dL    Bun/Cre Ratio NOT REPORTED 9 - 20    Calcium 9.6 8.6 - 10.4 mg/dL    Sodium 135 135 - 144 mmol/L    Potassium 4.1 3.7 - 5.3 mmol/L    Chloride 102 98 - 107 mmol/L    CO2 23 20 - 31 mmol/L    Anion Gap 10 9 - 17 mmol/L    Alkaline Phosphatase 80 40 - 129 U/L    ALT 17 5 - 41 U/L    AST 11 <40 U/L    Total Bilirubin 0.74 0.3 - 1.2 mg/dL    Total Protein 6.4 6.4 - 8.3 g/dL    Albumin 4.4 3.5 - 5.2 g/dL    Albumin/Globulin Ratio 2.2 1.0 - 2.5    GFR Non-African American >60 >60 mL/min    GFR African American >60 >60 mL/min    GFR Comment          GFR Staging NOT REPORTED    Urinalysis Reflex to Culture    Collection Time: 01/31/22  6:45 PM    Specimen: Urine, clean catch   Result Value Ref Range    Color, UA Yellow Yellow    Turbidity UA Clear Clear    Glucose, Ur NEGATIVE NEGATIVE    Bilirubin Urine NEGATIVE NEGATIVE    Ketones, Urine NEGATIVE NEGATIVE    Specific Gravity, UA 1.020 1.005 - 1.030    Urine Hgb NEGATIVE NEGATIVE    pH, UA 6.5 5.0 - 8.0    Protein, UA NEGATIVE NEGATIVE    Urobilinogen, Urine Normal Normal    Nitrite, Urine NEGATIVE NEGATIVE    Leukocyte Esterase, Urine NEGATIVE NEGATIVE    Urinalysis Comments       Microscopic exam not performed based on chemical results unless requested in original order. Protime-INR    Collection Time: 01/31/22 10:18 PM   Result Value Ref Range    Protime 13.7 (H) 9.1 - 12.3 sec    INR 1.3    COVID-19, Rapid    Collection Time: 01/31/22 10:39 PM    Specimen: Nasopharyngeal Swab   Result Value Ref Range    Specimen Description . NASOPHARYNGEAL SWAB     SARS-CoV-2, Rapid Not Detected Not Detected       Imaging:   CT ABDOMEN PELVIS W IV CONTRAST Additional Contrast? None    Result Date: 1/31/2022  Acute small bowel obstruction due to a focal periumbilical ventral abdominal wall hernia just left of midline. Sigmoid diverticulosis. Small hiatal hernia. Nonobstructing bilateral renal calculi. XR ABDOMEN FOR NG/OG/NE TUBE PLACEMENT    Result Date: 1/31/2022  The enteric tube has been placed in good position as above. ASSESSMENT & PLAN     ASSESSMENT:     Primary Problem  Small bowel obstruction [facility])    Active Hospital Problems    Diagnosis Date Noted    Small bowel obstruction ([address] [K56.609] 01/31/2022    S/P prostatectomy [Z90.79] 01/23/2022    Atrial fibrillation, controlled ([address] [I48.91] 01/23/2022    Intestinal volvulus ([address] [K56.2] 01/20/2017    Ventral incisional hernia [K43.2] 01/20/2017    Morbid obesity due to excess calories ([address] [E66.01] 01/20/2017    Essential hypertension [I10] 01/20/2017    Mixed hyperlipidemia [E78.2] 01/20/2017    Gastroesophageal reflux disease without esophagitis [K21.9] 01/20/2017    Adenocarcinoma of prostate ([address] Dale Lubin 07/11/2016       PLAN:     IMPRESSION  This is a 78 y.o. male who presented with above mentioned complaints and was admitted to inpatient service for further management as follows:     Principal Problem:    Small bowel obstruction ([address]  Active Problems:    Adenocarcinoma of prostate ([address]    Morbid obesity due to excess calories ([address]    Gastroesophageal reflux disease without esophagitis    Mixed hyperlipidemia    Essential hypertension    Intestinal volvulus ([address]    Ventral incisional hernia    Atrial fibrillation, controlled ([address]    S/P prostatectomy  Resolved Problems:    * No resolved hospital problems. *      · Small bowel obstruction-NG in place, general surgery on board, conservative management recommended. NG to suction, continue to keep patient n.p.o.   As needed morphine for pain control, normal saline at 75 mL/h.  · A. fib-rate controlled-takes warfarin at home, hold warfarin for now, PT/INR subtherapeutic, continue heparin drip for now, if no surgical intervention planned, will resume warfarin. · Morbid obesity-patient counseled  · History of adenocarcinoma s/p prostatectomy-stable  · Hyperlipidemia-on Lipitor  · BPH-on Flomax.     Diet: N.p.o., advance as per surgery  DVT ppx: Already on heparin drip for A. fib  GI ppx: Protonix    PT/OT/SW-consulted  Discharge Planning:consulted       Chester Carrasquillo MD  Internal Medicine Resident  [address]  2/1/2022 2:36 AM

## 2022-02-01 NOTE — PROGRESS NOTES
PROGRESS NOTE      PATIENT NAME: Arsh Farrell  MEDICAL RECORD NO. [id number]  DATE: 2022  PRIMARY CARE PHYSICIAN: Ila Mueller    HD: # 1    ASSESSMENT    Patient Active Problem List   Diagnosis    Chest pain    Paroxysmal atrial fibrillation (HCC)    Acute vasomotor rhinitis    Adenocarcinoma of prostate ([address]    Morbid obesity due to excess calories ([address]    Cardiomyopathy ([address]    Dermatitis    Eczema    Erectile dysfunction following radical prostatectomy    Fear of flying    Gastroesophageal reflux disease without esophagitis    Herpes simplex type 1 infection    Mixed hyperlipidemia    Essential hypertension    Intestinal volvulus (HCC)    Obstructive sleep apnea syndrome    Osteoarthritis of knee    Overactive bladder    Strain of lumbar region    Ventral incisional hernia    Viral upper respiratory tract infection    Atrial fibrillation, controlled ([address]    S/P prostatectomy    Small bowel obstruction (HCC)    COVID-19    Seasonal allergic rhinitis due to pollen       MEDICAL DECISION MAKING AND PLAN    Continue NPO with NGT, ordered SBFT  Supportive care per primary  Patient not having abdominal pain at this time, labs wnl. No further nausea, will continue conservative management and see what SBFT shows. Further recs to follow imaging. SUBJECTIVE    Arsh Farrell was seen and examined no acute events overnight. NPO with NGT. No abd pain at this time. No nausea.  NGT with 250cc output      OBJECTIVE  VITALS: Temp: Temp: 98.1 °F (36.7 °C)Temp  Av °F (36.7 °C)  Min: 97.7 °F (36.5 °C)  Max: 98.1 °F (30.9 °C) BP Systolic (08UYE), CQX:396 , Min:124 , VQP:029   Diastolic (23UIL), AGW:54, Min:77, Max:100   Pulse Pulse  Av.6  Min: 58  Max: 77 Resp Resp  Av.8  Min: 17  Max: 21 Pulse ox SpO2  Av.9 %  Min: 90 %  Max: 97 %  GENERAL: alert, no distress  LUNGS: no inc WOB  HEART: normal rate and regular rhythm  ABDOMEN: soft, non-tender, obese, previous scars noted    I/O last 3 completed shifts: In: 320 [I.V.:320]  Out: 250 [Emesis/NG output:250]    Drain/tube output: In: 320 [I.V.:320]  Out: 250     LAB:  CBC:   Recent Labs     01/31/22 1749 02/01/22 0255   WBC 9.4 7.9   HGB 15.5 14.9   HCT 46.0 44.0   MCV 83.5 83.8    146     BMP:   Recent Labs     01/31/22 1749 02/01/22 0255    134*   K 4.1 4.4    105   CO2 23 19*   BUN 15 13   CREATININE 1.02 0.91   GLUCOSE 88 99     COAGS:   Recent Labs     01/31/22 1749 01/31/22 2218 02/01/22 0255   APTT  --   --  27.4   PROT 6.4  --   --    INR  --  1.3  --        RADIOLOGY:  pending      Ruth Perla DO  2/1/22, 8:33 AM       Attending Note      I have reviewed the above [facility] note(s) and I either performed the key elements of the medical history and physical exam or was present with the surgery resident when the key elements of the medical history and physical exam were performed. I have discussed the findings, established the care plan and recommendations with the surgical team.  Feels somewhat better. Denies abd pain, passing small amt flatus. Will check SBFT.     Hu Wood MD  2/1/2022  8:41 AM

## 2022-02-01 NOTE — ED PROVIDER NOTES
regurgitation, unable to estimate RVSP. Pulmonic Valve Pulmonic valve is normal in structure and function. Trivial pulmonic insufficiency. No evidence of pulmonic stenosis. Pericardial Effusion No pericardial effusion. Miscellaneous Normal aortic root dimension. The ascending aorta is normal in size. E/E' average = 7.8. IVC normal diameter & inspiratory collapse indicating normal RA filling pressure .  M-mode / 2D Measurements & Calculations:   LVIDd:5.6 cm(3.7 - 5.6 cm)       Diastolic HICVJM:955 ml  PWKHI:4.1 cm(2.2 - 4.0 cm)       Systolic RHHUTN:53.8 ml  ZTRH:0.3 cm(0.6 - 1.1 cm)        Aortic Root:4 cm(2.0 - 3.7 cm)  LVPWd:1.2 cm(0.6 - 1.1 cm)       LA Dimension: 4.3 cm(1.9 - 4.0 cm)  Fractional Shortenin.07 %    LA volume/Index: 66.7 ml /25m^2  Calculated LVEF (%): 79.6 %      LVOT:2.2 cm                                   RVDd:4.3 cm   Mitral:                                 Aortic   Valve Area (P1/2-Time): 3.61 cm^2       Peak Velocity: 1.16 m/s  Peak E-Wave: 0.75 m/s                   Mean Velocity: 0.88 m/s  Peak A-Wave: 0.51 m/s                   Peak Gradient: 5.38 mmHg  E/A Ratio: 1.46                         Mean Gradient: 4 mmHg  Peak Gradient: 2.25 mmHg  Mean Gradient: 1 mmHg  Deceleration Time: 229 msec             Area (continuity): 3.23 cm^2  P1/2t: 61 msec                          AV VTI: 25.2 cm   Area (continuity): 3.15 cm^2  Mean Velocity: 0.48 m/s                                           Pulmonic:                                           Peak Velocity: 1.02 m/s                                          Peak Gradient: 4.16 mmHg  Diastology / Tissue Doppler Septal Wall E' velocity:0.08 m/s Septal Wall E/E':8.8 Lateral Wall E' velocity:0.11 m/s Lateral Wall E/E':6.8    CT ABDOMEN PELVIS W IV CONTRAST Additional Contrast? None    Result Date: 2022  EXAMINATION: CT OF THE ABDOMEN AND PELVIS WITH CONTRAST 2022 6:12 pm TECHNIQUE: CT of the abdomen and pelvis was performed with the administration of intravenous contrast. Multiplanar reformatted images are provided for review. Dose modulation, iterative reconstruction, and/or weight based adjustment of the mA/kV was utilized to reduce the radiation dose to as low as reasonably achievable. COMPARISON: None. HISTORY: ORDERING SYSTEM PROVIDED HISTORY: right lower quadrant abdominal pain TECHNOLOGIST PROVIDED HISTORY: right lower quadrant abdominal pain Decision Support Exception - unselect if not a suspected or confirmed emergency medical condition->Emergency Medical Condition (MA) Reason for Exam: right lower quadrant abdominal pain FINDINGS: Lower Chest: Heart size is normal.  The visualized lung bases are clear. Organs: Several benign bilateral renal and hepatic cysts are noted, largest within the left upper pole measuring 7.4 cm. There are numerous nonobstructing stones throughout both kidneys, greater on the left, measuring 1-4 mm in size. Focal chronic scarring of the lower right renal cortex. The liver, spleen, pancreas, kidneys, gallbladder, and adrenal glands otherwise appear normal. GI/Bowel: Post-surgical changes of ventral abdominal wall hernia repair are present. There is an acute obstruction of the small bowel due to a focal segment of herniated small bowel just left of the hernia repair site within the periumbilical ventral left mid abdomen. Distended small bowel loops proximal to this are fluid-filled and measure up to 4.5 cm in caliber. There is mild diffuse mesenteric edema but no bowel wall thickening and no free air or pneumatosis or mesenteric or portal venous gas. A few scattered diverticula are present throughout the sigmoid colon. No findings of acute diverticulitis. Small hiatal hernia. The appendix is normal. Peritoneum/Retroperitoneum: Trace mesenteric free fluid. No free air or abnormal fluid collection. No enlarged or suspicious mesenteric or retroperitoneal lymphadenopathy.  The abdominal aorta and iliac arteries are patent and of normal caliber. Pelvis: No pelvic free fluid or enlarged or suspicious pelvic or inguinal lymphadenopathy. The urinary bladder is decompressed. The prostate gland has been removed. The pelvic bowel loops are unremarkable. Bones/Soft Tissues: Degenerative changes are present throughout the lumbar spine. No acute fracture. No suspicious bone lesions. Acute small bowel obstruction due to a focal periumbilical ventral abdominal wall hernia just left of midline. Sigmoid diverticulosis. Small hiatal hernia. Nonobstructing bilateral renal calculi. XR CHEST PORTABLE    Result Date: 1/23/2022  EXAMINATION: ONE XRAY VIEW OF THE CHEST 1/23/2022 12:32 pm COMPARISON: 08/14/2015 HISTORY: ORDERING SYSTEM PROVIDED HISTORY: Chest pain TECHNOLOGIST PROVIDED HISTORY: Chest pain Reason for Exam: uprt port 71-year-old male with chest pain FINDINGS: Loop recorder device projects over the left upper hemithorax. Portable upright view of the chest. Cardiac monitor leads overlie the chest. Cardiac and mediastinal contours remain unchanged. Mild pulmonary vascular congestion. Stable mild cardiomegaly. No acute focal airspace consolidation or pleural effusions. Visualized osseous structures remain unchanged. Stable mild cardiomegaly. Mild pulmonary vascular congestion. RECENT VITALS:     Temp: 97.7 °F (36.5 °C),  Pulse: 66, Resp: 18, BP: 127/81, SpO2: 93 %        ED Course as of 01/31/22 2231 Mon Jan 31, 2022 1931 Ct reviewed multiple hepatic and renal cyst [BG]   1946 Sudden onset RLQ abdomen pain, had bowel movement, no N/V, no trauma, no concern for AAA, lactic (-), need to follow up on CT abdomen, cysts noted on resident read of CT, if CT (-) d/c with PCP follow-up  [TJ]      ED Course User Index  [BG] Karina Worthington DO  [TJ] Kwame Shine MD     The abdomen diagnostic for small bowel obstruction secondary to ventral hernia. Will decompressed with NG.   Will consult surgery for admission. Surgery declined admission because patient has history of atrial fibrillation on Coumadin. Patient admitted to the medicine service. OUTSTANDING TASKS / RECOMMENDATIONS:    1. CT abdomen     FINAL IMPRESSION:     1. Abdominal pain, right lower quadrant    2. Small bowel obstruction (HCC)        DISPOSITION:         DISPOSITION:  []  Discharge   []  Transfer -    [x]  Admission -  Surgery   []  Against Medical Advice   []  Eloped   FOLLOW-UP: No follow-up provider specified.    DISCHARGE MEDICATIONS: New Prescriptions    No medications on file          Ebony Keita MD  Emergency Medicine Resident  [facility]       Ebony Keita MD  Resident  01/31/22 2019       Ebony Keita MD  Resident  01/31/22 [address] [place] [address], MD  Resident  01/31/22 6038

## 2022-02-01 NOTE — ED NOTES
The following labs labeled with pt sticker and tubed to lab:     [] Blue     [] Lavender   [] on ice  [] Green/yellow  [] Green/black [] on ice  [] Yellow  [] Red  [] Pink      [x] COVID-19 swab    [x] Rapid  [] PCR  [] Flu swab  [] Peds Viral Panel     [] Urine Sample  [] Pelvic Cultures  [] Blood Cultures            Carolyn Villareal RN  01/31/22 1776

## 2022-02-01 NOTE — PROGRESS NOTES
Pt admitted to room 2024 arrived via stretcher. Orientation provided to pt. NG attached to low intermittent suction. Plan of care reviewed with pt. Pt to remain NPO per order.

## 2022-02-01 NOTE — ED PROVIDER NOTES
Faculty Sign-Out Attestation  Handoff taken on the following patient from prior Attending Physician: Neelam Khan    I was available and discussed any additional care issues that arose and coordinated the management plans with the resident(s) caring for the patient during my duty period. Any areas of disagreement with residents documentation of care or procedures are noted on the chart. I was personally present for the key portions of any/all procedures during my duty period. I have documented in the chart those procedures where I was not present during the key portions. CT ABDOMEN PELVIS W IV CONTRAST Additional Contrast? None   Final Result   Acute small bowel obstruction due to a focal periumbilical ventral abdominal   wall hernia just left of midline. Sigmoid diverticulosis. Small hiatal hernia. Nonobstructing bilateral renal calculi.          XR ABDOMEN FOR NG/OG/NE TUBE PLACEMENT    (Results Pending)         Osmany Sanders MD  Attending Physician       Osmany Sanders MD  01/31/22 9483

## 2022-02-01 NOTE — CARE COORDINATION
02/01/22 1254   Readmission Assessment   Number of Days since last admission? 1-7 days   Previous Disposition Home with Family   Who is being Interviewed Patient   What was the patient's/caregiver's perception as to why they think they needed to return back to the hospital? Other (Comment)  (abd. pain)   Did you visit your Primary Care Physician after you left the hospital, before you returned this time? No   Why weren't you able to visit your PCP? Other (Comment)  (appt. was today)   Did you see a specialist, such as Cardiac, Pulmonary, Orthopedic Physician, etc. after you left the hospital? No   Who advised the patient to return to the hospital? Self-referral   Does the patient report anything that got in the way of taking their medications? No   In our efforts to provide the best possible care to you and others like you, can you think of anything that we could have done to help you after you left the hospital the first time, so that you might not have needed to return so soon?  Other (Comment)  (nothing)

## 2022-02-01 NOTE — CARE COORDINATION
Case Management Initial Discharge Plan  Kay Chaudhry,             Met with:patient or spouse/SO Gold Tejeda to discuss discharge plans. Information verified: address, contacts, phone number, , insurance Yes  Insurance Provider: Medicare    Emergency Contact/Next of Kin name & number: wife [phone]  Who are involved in patient's support system? Wife Gold Tejeda    PCP: ROSLYN Ryan CNP  Date of last visit: 6 months ago      Discharge Planning    Living Arrangements:  Spouse/Significant Other     Home has one story  3 stairs to climb to get into front door    Patient able to perform ADL's:Independent    Current Services (outpatient & in home) none  DME equipment: CPAP  DME provider:     Is patient receiving oral anticoagulation therapy? Yes-PO eliquis          Potential Assistance Needed:  N/A    Patient agreeable to home care: No  [place] of choice provided:  n/a    Prior SNF/Rehab Placement and Facility: N/A  Agreeable to SNF/Rehab: No  [place] of choice provided: n/a     Evaluation: n/a    Expected Discharge date:  22    Patient expects to be discharged to:    home  If home: is the family and/or caregiver wiling & able to provide support at home? yes  Who will be providing this support? Wife Gold Tejeda*    Follow Up Appointment: Best Day/ Time:      Transportation provider: wife  Transportation arrangements needed for discharge: No    Readmission Risk              Risk of Unplanned Readmission:  11             Does patient have a readmission risk score greater than 14?: No  If yes, follow-up appointment must be made within 7 days of discharge.      Goals of Care: no abd pain, no SBO      Educated patient and wife on transitional options, provided freedom of choice and are agreeable with plan      Discharge Plan: home with wife Matias baer [place]          Electronically signed by Smitha Heath RN on 22 at 12:57 PM EST

## 2022-02-02 VITALS
BODY MASS INDEX: 38.54 KG/M2 | SYSTOLIC BLOOD PRESSURE: 149 MMHG | TEMPERATURE: 98.6 F | DIASTOLIC BLOOD PRESSURE: 62 MMHG | WEIGHT: 310 LBS | HEIGHT: 75 IN | RESPIRATION RATE: 19 BRPM | OXYGEN SATURATION: 95 % | HEART RATE: 77 BPM

## 2022-02-02 LAB
ANION GAP SERPL CALCULATED.3IONS-SCNC: 10 MMOL/L (ref 9–17)
BUN BLDV-MCNC: 15 MG/DL (ref 8–23)
BUN/CREAT BLD: NORMAL (ref 9–20)
CALCIUM SERPL-MCNC: 8.7 MG/DL (ref 8.6–10.4)
CHLORIDE BLD-SCNC: 106 MMOL/L (ref 98–107)
CO2: 21 MMOL/L (ref 20–31)
CREAT SERPL-MCNC: 1.12 MG/DL (ref 0.7–1.2)
GFR AFRICAN AMERICAN: >60 ML/MIN
GFR NON-AFRICAN AMERICAN: >60 ML/MIN
GFR SERPL CREATININE-BSD FRML MDRD: NORMAL ML/MIN/{1.73_M2}
GFR SERPL CREATININE-BSD FRML MDRD: NORMAL ML/MIN/{1.73_M2}
GLUCOSE BLD-MCNC: 92 MG/DL (ref 70–99)
HCT VFR BLD CALC: 44.6 % (ref 40.7–50.3)
HEMOGLOBIN: 14.5 G/DL (ref 13–17)
INR BLD: 1.2
MCH RBC QN AUTO: 27.4 PG (ref 25.2–33.5)
MCHC RBC AUTO-ENTMCNC: 32.5 G/DL (ref 28.4–34.8)
MCV RBC AUTO: 84.2 FL (ref 82.6–102.9)
NRBC AUTOMATED: 0 PER 100 WBC
PARTIAL THROMBOPLASTIN TIME: 49.5 SEC (ref 20.5–30.5)
PARTIAL THROMBOPLASTIN TIME: 54.5 SEC (ref 20.5–30.5)
PDW BLD-RTO: 14.2 % (ref 11.8–14.4)
PLATELET # BLD: 141 K/UL (ref 138–453)
PMV BLD AUTO: 10.5 FL (ref 8.1–13.5)
POTASSIUM SERPL-SCNC: 4 MMOL/L (ref 3.7–5.3)
PROTHROMBIN TIME: 12.2 SEC (ref 9.1–12.3)
RBC # BLD: 5.3 M/UL (ref 4.21–5.77)
SODIUM BLD-SCNC: 137 MMOL/L (ref 135–144)
WBC # BLD: 6.7 K/UL (ref 3.5–11.3)

## 2022-02-02 PROCEDURE — 80048 BASIC METABOLIC PNL TOTAL CA: CPT

## 2022-02-02 PROCEDURE — 6360000002 HC RX W HCPCS: Performed by: STUDENT IN AN ORGANIZED HEALTH CARE EDUCATION/TRAINING PROGRAM

## 2022-02-02 PROCEDURE — 36415 COLL VENOUS BLD VENIPUNCTURE: CPT

## 2022-02-02 PROCEDURE — C9113 INJ PANTOPRAZOLE SODIUM, VIA: HCPCS | Performed by: STUDENT IN AN ORGANIZED HEALTH CARE EDUCATION/TRAINING PROGRAM

## 2022-02-02 PROCEDURE — 99233 SBSQ HOSP IP/OBS HIGH 50: CPT | Performed by: INTERNAL MEDICINE

## 2022-02-02 PROCEDURE — 2580000003 HC RX 258: Performed by: STUDENT IN AN ORGANIZED HEALTH CARE EDUCATION/TRAINING PROGRAM

## 2022-02-02 PROCEDURE — 85027 COMPLETE CBC AUTOMATED: CPT

## 2022-02-02 PROCEDURE — 6370000000 HC RX 637 (ALT 250 FOR IP): Performed by: STUDENT IN AN ORGANIZED HEALTH CARE EDUCATION/TRAINING PROGRAM

## 2022-02-02 PROCEDURE — 85610 PROTHROMBIN TIME: CPT

## 2022-02-02 PROCEDURE — 85730 THROMBOPLASTIN TIME PARTIAL: CPT

## 2022-02-02 RX ORDER — METOPROLOL SUCCINATE 50 MG/1
50 TABLET, EXTENDED RELEASE ORAL DAILY
Qty: 30 TABLET | Refills: 3 | Status: SHIPPED | OUTPATIENT
Start: 2022-02-02

## 2022-02-02 RX ORDER — WARFARIN SODIUM 5 MG/1
TABLET ORAL
Qty: 30 TABLET | Refills: 0 | Status: SHIPPED | OUTPATIENT
Start: 2022-02-02

## 2022-02-02 RX ORDER — METOPROLOL SUCCINATE 50 MG/1
50 TABLET, EXTENDED RELEASE ORAL DAILY
Status: DISCONTINUED | OUTPATIENT
Start: 2022-02-02 | End: 2022-02-02 | Stop reason: HOSPADM

## 2022-02-02 RX ORDER — CARVEDILOL 3.12 MG/1
3.12 TABLET ORAL 2 TIMES DAILY WITH MEALS
Status: DISCONTINUED | OUTPATIENT
Start: 2022-02-02 | End: 2022-02-02

## 2022-02-02 RX ORDER — WARFARIN SODIUM 7.5 MG/1
7.5 TABLET ORAL
Status: COMPLETED | OUTPATIENT
Start: 2022-02-02 | End: 2022-02-02

## 2022-02-02 RX ADMIN — SODIUM CHLORIDE, PRESERVATIVE FREE 10 ML: 5 INJECTION INTRAVENOUS at 08:23

## 2022-02-02 RX ADMIN — METOPROLOL SUCCINATE 50 MG: 50 TABLET, FILM COATED, EXTENDED RELEASE ORAL at 17:38

## 2022-02-02 RX ADMIN — MORPHINE SULFATE 2 MG: 2 INJECTION, SOLUTION INTRAMUSCULAR; INTRAVENOUS at 05:50

## 2022-02-02 RX ADMIN — HEPARIN SODIUM 11 UNITS/KG/HR: 5000 INJECTION INTRAVENOUS; SUBCUTANEOUS at 02:00

## 2022-02-02 RX ADMIN — ATORVASTATIN CALCIUM 20 MG: 20 TABLET, FILM COATED ORAL at 08:22

## 2022-02-02 RX ADMIN — HEPARIN SODIUM 2000 UNITS: 1000 INJECTION INTRAVENOUS; SUBCUTANEOUS at 05:30

## 2022-02-02 RX ADMIN — PANTOPRAZOLE SODIUM 40 MG: 40 INJECTION, POWDER, FOR SOLUTION INTRAVENOUS at 08:22

## 2022-02-02 RX ADMIN — SODIUM CHLORIDE: 9 INJECTION, SOLUTION INTRAVENOUS at 04:09

## 2022-02-02 RX ADMIN — WARFARIN SODIUM 7.5 MG: 7.5 TABLET ORAL at 17:38

## 2022-02-02 ASSESSMENT — PAIN SCALES - GENERAL
PAINLEVEL_OUTOF10: 4
PAINLEVEL_OUTOF10: 0

## 2022-02-02 NOTE — PROGRESS NOTES
Pharmacy Note  Warfarin Consult    Haley Mitchell is a 78 y.o. male for whom pharmacy has been consulted to manage warfarin therapy. 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 Thank you for the consult. Will continue to follow. [address] [place] [address]  Ph., CACP, Clinical Pharmacist  Anticoagulation Services, [facility]  2/2/2022  11:40 AM

## 2022-02-02 NOTE — PROGRESS NOTES
Rounded with attending. Tolerated CLD breakfast. Advanced to low fiber and NGT removed. If tolerating diet ok for DC this evening around dinner time. Ok to resume coumadin.      Electronically signed by Jesus Daly DO on 2/2/2022 at 10:49 AM

## 2022-02-02 NOTE — PROGRESS NOTES
Vern Rucker  Occupational Therapy Not Seen Note    DATE: 2022    NAME: Liberty Goodpasture  MRN: [id number]   : 1955      Patient not seen this date for Occupational Therapy due to:    Patient independent with ADLs and functional tasks with no acute OT needs. Pt demo independent bed mobility and functional mobility within hospital room. Pt states no safety concerns for returning home at discharge. Will defer OT evaluation at this time. Please reorder OT if future needs arise.          Electronically signed by Olegario Pillai OT on 2022 at 12:52 PM

## 2022-02-02 NOTE — FLOWSHEET NOTE
Discharge instructions reviewed with pt/family, discussed medications, VU, denies any further questions or anxiety related to discharge. IV/tele discontinued. Pt discharged to home with spouse. Taken from room via wheelchair by RN, personal belongings taken with.

## 2022-02-02 NOTE — PLAN OF CARE
Problem: Falls - Risk of:  Goal: Will remain free from falls  Description: Will remain free from falls  2/2/2022 1246 by Lillie Grey RN  Outcome: Ongoing  2/2/2022 0254 by Michaela Mueller RN  Outcome: Ongoing  Goal: Absence of physical injury  Description: Absence of physical injury  2/2/2022 1246 by Lillie Grey RN  Outcome: Ongoing  2/2/2022 0254 by Michaela Mueller RN  Outcome: Ongoing     Problem: Pain:  Goal: Pain level will decrease  Description: Pain level will decrease  2/2/2022 1246 by Lillie Grey RN  Outcome: Ongoing  2/2/2022 0254 by Michaela Mueller RN  Outcome: Ongoing  Goal: Control of acute pain  Description: Control of acute pain  2/2/2022 1246 by Lillie Grey RN  Outcome: Ongoing  2/2/2022 0254 by Michaela Mueller RN  Outcome: Ongoing  Goal: Control of chronic pain  Description: Control of chronic pain  2/2/2022 1246 by Lillie Grey RN  Outcome: Ongoing  2/2/2022 0254 by Michaela Mueller RN  Outcome: Ongoing     Problem: Fluid Volume - Imbalance:  Goal: Absence of imbalanced fluid volume signs and symptoms  Description: Absence of imbalanced fluid volume signs and symptoms  2/2/2022 1246 by Lillie Grey RN  Outcome: Ongoing  2/2/2022 0254 by Michaela Mueller RN  Outcome: Ongoing     Problem: Activity:  Goal: Risk for activity intolerance will decrease  Description: Risk for activity intolerance will decrease  2/2/2022 1246 by Lillie Grey RN  Outcome: Ongoing  2/2/2022 0254 by Michaela Mueller RN  Outcome: Ongoing     Problem:  Bowel/Gastric:  Goal: Bowel function will improve  Description: Bowel function will improve  2/2/2022 1246 by Lillie Grey RN  Outcome: Ongoing  2/2/2022 0254 by Michaela Mueller RN  Outcome: Ongoing  Goal: Diagnostic test results will improve  Description: Diagnostic test results will improve  2/2/2022 1246 by Lillie Grey RN  Outcome: Ongoing  2/2/2022 0254 by Michaela Mueller RN  Outcome: Ongoing  Goal: Occurrences of nausea will decrease  Description: Occurrences of nausea will decrease  2/2/2022 1246 by Shawn Argueta RN  Outcome: Ongoing  2/2/2022 0254 by Ilya Laguna RN  Outcome: Ongoing  Goal: Occurrences of vomiting will decrease  Description: Occurrences of vomiting will decrease  2/2/2022 1246 by Shawn Argueta RN  Outcome: Ongoing  2/2/2022 0254 by Ilya Laguna RN  Outcome: Ongoing     Problem: Fluid Volume:  Goal: Maintenance of adequate hydration will improve  Description: Maintenance of adequate hydration will improve  2/2/2022 1246 by Shawn Argueta RN  Outcome: Ongoing  2/2/2022 0254 by Ilya Laguna RN  Outcome: Ongoing     Problem: Health Behavior:  Goal: Ability to state signs and symptoms to report to health care provider will improve  Description: Ability to state signs and symptoms to report to health care provider will improve  2/2/2022 1246 by Shawn Argueta RN  Outcome: Ongoing  2/2/2022 0254 by Ilya Laguna RN  Outcome: Ongoing     Problem: Physical Regulation:  Goal: Complications related to the disease process, condition or treatment will be avoided or minimized  Description: Complications related to the disease process, condition or treatment will be avoided or minimized  2/2/2022 1246 by Shawn Argueta RN  Outcome: Ongoing  2/2/2022 0254 by Ilya Laguna RN  Outcome: Ongoing  Goal: Ability to maintain clinical measurements within normal limits will improve  Description: Ability to maintain clinical measurements within normal limits will improve  2/2/2022 1246 by Shawn Argueta RN  Outcome: Ongoing  2/2/2022 0254 by Ilya Laguna RN  Outcome: Ongoing     Problem: Sensory:  Goal: Pain level will decrease  Description: Pain level will decrease  2/2/2022 1246 by Shawn Argueta RN  Outcome: Ongoing  2/2/2022 0254 by Ilya Laguna RN  Outcome: Ongoing  Goal: Ability to identify factors that increase the pain will improve  Description: Ability to identify factors that increase the pain will improve  2/2/2022 1246 by Shawn Argueta RN  Outcome: Ongoing  2/2/2022 0254 by Nellie Painting RN  Outcome: Ongoing  Goal: Ability to notify healthcare provider of pain before it becomes unmanageable or unbearable will improve  Description: Ability to notify healthcare provider of pain before it becomes unmanageable or unbearable will improve  2/2/2022 1246 by Lucero Turpin RN  Outcome: Ongoing  2/2/2022 0254 by Nellie Painting RN  Outcome: Ongoing

## 2022-02-02 NOTE — PROGRESS NOTES
[facility]  Internal Medicine Teaching Residency Program  Inpatient Daily Progress Note  ______________________________________________________________________________    Patient: Jarad Dominguez  YOB: 1955   FDB:[id number]    Acct: [de-identified]     Room: 2024/2024-01  Admit date: 1/31/2022  Today's date: 02/02/22  Number of days in the hospital: 2    SUBJECTIVE   Admitting Diagnosis: Small bowel obstruction ([address]  CC: Abdominal pain    Patient was seen and examined at bedside, no acute events overnight. Small bowel follow through was negative for SBO. The patient's symptoms have resolved, NG tube was discontinued and was able to tolerate CLD this am, advanced to regular diet now. He was on Toprol XL 50 mg at home along with lipitor and coumadin, reconciled the same. INR pending, will decide on coumadin dose until Friday as per Pharmacy based on the same and he will follow up with [facility] on Friday. Labs and imaging reviewed, unremarkable. ROS:  Constitutional:  negative for chills, fevers, sweats  Respiratory:  negative for cough, dyspnea on exertion, hemoptysis, shortness of breath, wheezing  Cardiovascular:  negative for chest pain, chest pressure/discomfort, lower extremity edema, palpitations  Gastrointestinal:  negative for abdominal pain, constipation, diarrhea, nausea, vomiting  Neurological:  negative for dizziness, headache  BRIEF HISTORY     The patient is a pleasant 78 y.o. male presents with a chief complaint of right lower quadrant abdominal pain which began few hours prior to admission and is progressively getting worse. Patient reported that he had 2 bowel movements since morning however was unable to pass any flatus after that. Patient also reported some nausea but no vomiting. Denies any improvement in abdominal pain after bowel movements. Patient reports being constipated for last couple of his.   Patient was initially presented to urgent care from where he was sent to the ER. Patient has history of bowel obstruction. Patient's past medical history includes  · Hypertension  · GERD  · A. fib with RVR s/p ablation 6/7 years ago, currently on Cardizem and warfarin  · Prostate cancer s/p prostatectomy 2015. OBJECTIVE     Vital Signs:  BP (!) 149/62   Pulse 77   Temp 98.6 °F (37 °C) (Oral)   Resp 19   Ht 6' 3\" (1.905 m)   Wt (!) 310 lb (140.6 kg)   SpO2 95%   BMI 38.75 kg/m²     Temp (24hrs), Av.2 °F (36.8 °C), Min:97.7 °F (36.5 °C), Max:98.6 °F (37 °C)    In: 1054   Out: 1600 [Urine:1050]    Physical Exam:  Constitutional: This is a well developed, well nourished, 35-39.9 - Obesity Grade II 78y.o. year old male who is alert, oriented, cooperative and in no apparent distress. Head:normocephalic and atraumatic. EENT:  PERRLA. No conjunctival injections. Septum was midline, mucosa was without erythema, exudates or cobblestoning. No thrush was noted. Neck: Supple without thyromegaly. No elevated JVP. Trachea was midline. Respiratory: Chest was symmetrical without dullness to percussion. Breath sounds bilaterally were clear to auscultation. There were no wheezes, rhonchi or rales. There is no intercostal retraction or use of accessory muscles. No egophony noted. Cardiovascular: Regular without murmur, clicks, gallops or rubs. Abdomen: Slightly rounded and soft without organomegaly. No rebound, rigidity or guarding was appreciated. Lymphatic: No lymphadenopathy. Musculoskeletal: Normal curvature of the spine. No gross muscle weakness. Extremities:  No lower extremity edema, ulcerations, tenderness, varicosities or erythema. Muscle size, tone and strength are normal.  No involuntary movements are noted. Skin:  Warm and dry. Good color, turgor and pigmentation. No lesions or scars.   No cyanosis or clubbing  Neurological/Psychiatric: The patient's general behavior, level of consciousness, thought content and emotional status is normal.        Medications:  Scheduled Medications:    carvedilol  3.125 mg Oral BID WC    atorvastatin  20 mg Oral Daily    sodium chloride flush  5-40 mL IntraVENous 2 times per day    pantoprazole  40 mg IntraVENous Daily    And    sodium chloride (PF)  10 mL IntraVENous Daily     Continuous Infusions:    heparin (PORCINE) Infusion 13 Units/kg/hr (02/02/22 0530)    sodium chloride      sodium chloride 75 mL/hr at 02/02/22 0409     PRN Medicationsheparin (porcine), 4,000 Units, PRN  heparin (porcine), 2,000 Units, PRN  sodium chloride flush, 5-40 mL, PRN  sodium chloride, 25 mL, PRN  ondansetron, 4 mg, Q8H PRN   Or  ondansetron, 4 mg, Q6H PRN  polyethylene glycol, 17 g, Daily PRN  acetaminophen, 650 mg, Q6H PRN   Or  acetaminophen, 650 mg, Q6H PRN  potassium chloride, 10 mEq, PRN  magnesium sulfate, 2,000 mg, PRN  morphine, 2 mg, Q4H PRN        Diagnostic Labs:  CBC:   Recent Labs     01/31/22  1749 02/01/22  0255 02/02/22  0349   WBC 9.4 7.9 6.7   RBC 5.51 5.25 5.30   HGB 15.5 14.9 14.5   HCT 46.0 44.0 44.6   MCV 83.5 83.8 84.2   RDW 14.4 14.4 14.2    146 141     BMP:   Recent Labs     01/31/22  1749 02/01/22  0255 02/02/22  0349    134* 137   K 4.1 4.4 4.0    105 106   CO2 23 19* 21   BUN 15 13 15   CREATININE 1.02 0.91 1.12     BNP: No results for input(s): BNP in the last 72 hours. PT/INR:   Recent Labs     01/31/22  2218   PROTIME 13.7*   INR 1.3     APTT:   Recent Labs     02/01/22  1157 02/01/22  1940 02/02/22  0349   APTT 30.2 40.1* 49.5*     CARDIAC ENZYMES: No results for input(s): CKMB, CKMBINDEX, TROPONINI in the last 72 hours.     Invalid input(s): CKTOTAL;3  FASTING LIPID PANEL:No results found for: CHOL, HDL, TRIG  LIVER PROFILE:   Recent Labs     01/31/22  1749   AST 11   ALT 17   BILITOT 0.74   ALKPHOS 80      MICROBIOLOGY: No results found for: CULTURE    Imaging:    CT ABDOMEN PELVIS W IV CONTRAST Additional Contrast? None    Result Date: 1/31/2022  Acute small bowel obstruction due to a focal periumbilical ventral abdominal wall hernia just left of midline. Sigmoid diverticulosis. Small hiatal hernia. Nonobstructing bilateral renal calculi. XR ABDOMEN FOR NG/OG/NE TUBE PLACEMENT    Result Date: 1/31/2022  The enteric tube has been placed in good position as above. FL SMALL BOWEL FOLLOW THROUGH ONLY    Result Date: 2/1/2022  No evidence of bowel obstruction. ASSESSMENT & PLAN     ASSESSMENT / PLAN:      1. Small bowel obstruction (HCC)  Plan: Resolved, tolerated to CLD, advanced to regular diet, if able to tolerate, will d/c with OP follow up with General Surgery, appreciate recommendations. 2.   Atrial fibrillation, controlled ([facility] - rate controlled, continue toprol XL 50 mg daily, start coumadin - pharmacy to dose and f/u with Coumadin Clinic at [facility], d/c heparin, not on DOAC due to insurance issues. 3. Adenocarcinoma of prostate ([address]   4. Morbid obesity due to excess calories ([address] - Counseled on weight loss. 5. Gastroesophageal reflux disease without esophagitis - Continue Protonix   6. Mixed hyperlipidemia - Continue Lipitor. 7. Essential hypertension - Started on home dose Toprol XL 50 mg daily, follow up with PCP for BP check. 8. H/O Ventral incisional hernia - stable. 9. S/P prostatectomy    DVT ppx : Started on Coumadin, Heparin d/c d this am.   GI ppx: Protonix 40 mg daily    PT/OT: Consulted  Discharge Planning / SW:  Plan to d/c home today. Raymond Cuenca MD  Internal Medicine Resident, PGY-3  [address], [place]  2/2/2022, 10:23 AM

## 2022-02-02 NOTE — PROGRESS NOTES
PROGRESS NOTE      PATIENT NAME: Jorge Martinez  MEDICAL RECORD NO. [id number]  DATE: 2022  PRIMARY CARE PHYSICIAN: Sveta Abebe, APRN - CNP    HD: # 2    ASSESSMENT    Patient Active Problem List   Diagnosis    Chest pain    Paroxysmal atrial fibrillation ([address]    Acute vasomotor rhinitis    Adenocarcinoma of prostate ([address]    Morbid obesity due to excess calories ([address]    Cardiomyopathy ([address]    Dermatitis    Eczema    Erectile dysfunction following radical prostatectomy    Fear of flying    Gastroesophageal reflux disease without esophagitis    Herpes simplex type 1 infection    Mixed hyperlipidemia    Essential hypertension    Intestinal volvulus (HCC)    Obstructive sleep apnea syndrome    Osteoarthritis of knee    Overactive bladder    Strain of lumbar region    Ventral incisional hernia    Viral upper respiratory tract infection    Atrial fibrillation, controlled ([address]    S/P prostatectomy    Small bowel obstruction (HCC)    COVID-19    Seasonal allergic rhinitis due to pollen    Abdominal pain, right lower quadrant       MEDICAL DECISION MAKING AND PLAN    1. CLD, NGT clamped this morning, if tolerates will DC NG in 4rs  2. Supportive care per primary  3. Pain improved, no obstruction on SBFT. No surgical intervention at this time      [address] was seen and examined no acute events overnight. NGT with ice chips. No abd pain at this time. No nausea.  Passing flatus      OBJECTIVE  VITALS: Temp: Temp: 98.6 °F (37 °C)Temp  Av.2 °F (36.8 °C)  Min: 97.7 °F (36.5 °C)  Max: 98.6 °F (37 °C) BP Systolic (30ZJV), VIF:403 , Min:140 , HIO:303   Diastolic (35OSH), YGB:01, Min:62, Max:96   Pulse Pulse  Av.8  Min: 64  Max: 77 Resp Resp  Av  Min: 13  Max: 19 Pulse ox SpO2  Av.3 %  Min: 92 %  Max: 96 %  GENERAL: alert, no distress  LUNGS: no inc WOB  HEART: normal rate and regular rhythm  ABDOMEN: soft, non-tender, obese, previous scars noted    I/O last 3 completed shifts: In: 5057 [P. O.:90; I.V.:1284]  Out: 0836 [Urine:1050; Emesis/NG output:800]    Drain/tube output: In: 1054 [P.O.:90; I.V.:964]  Out: 1600 [Urine:1050]    LAB:  CBC:   Recent Labs     01/31/22 1749 02/01/22 0255 02/02/22 0349   WBC 9.4 7.9 6.7   HGB 15.5 14.9 14.5   HCT 46.0 44.0 44.6   MCV 83.5 83.8 84.2    146 141     BMP:   Recent Labs     01/31/22 1749 02/01/22 0255 02/02/22 0349    134* 137   K 4.1 4.4 4.0    105 106   CO2 23 19* 21   BUN 15 13 15   CREATININE 1.02 0.91 1.12   GLUCOSE 88 99 92     COAGS:   Recent Labs     01/31/22 1749 01/31/22 2218 02/01/22 0255 02/01/22  1157 02/01/22  1940 02/02/22 0349   APTT  --   --    < > 30.2 40.1* 49.5*   PROT 6.4  --   --   --   --   --    INR  --  1.3  --   --   --   --     < > = values in this interval not displayed. RADIOLOGY:    FL SMALL BOWEL FOLLOW THROUGH ONLY    Result Date: 2/1/2022  EXAMINATION: SMALL BOWEL FOLLOW THROUGH SERIES 2/1/2022 TECHNIQUE: Small bowel follow through series was performed with overhead images. COMPARISON: None HISTORY: ORDERING SYSTEM PROVIDED HISTORY: r/o sbo FINDINGS:  image of the abdomen demonstrates nonspecific bowel gas pattern. There is normal transit time as contrast reaches the colon by 60 minutes. No gross focal abnormalities, strictures or obstructions are seen of the small bowel. No evidence of bowel obstruction.          Electronically signed by Jian Fontana DO on 2/2/2022 at 7:46 AM

## 2022-02-02 NOTE — PROGRESS NOTES
CLINICAL PHARMACY NOTE: MEDS TO BEDS    Total # of Prescriptions Filled: 1   The following medications were delivered to the patient:  · Metoprolol succinate er 50 mg tab    Additional Documentation:Medication delivered to the patient in room 2024 @ 1:11 pm clover payment.

## 2022-02-02 NOTE — PROGRESS NOTES
Physical Therapy        Physical Therapy Cancel Note      DATE: 2022    NAME: Nellie Thorne  MRN: [id number]   : 1955      Patient not seen this date for Physical Therapy due to:    Patient independent with functional mobility. Will defer PT evaluation at this time. Please reorder PT if future needs arise. Discussed with patient who verbalized agreement and understanding and denies concerns returning home.       Electronically signed by Mary Castro PT on 2022 at 2:43 PM

## 2022-02-03 ENCOUNTER — CARE COORDINATION (OUTPATIENT)
Dept: CASE MANAGEMENT | Age: 67
End: 2022-02-03

## 2022-02-03 NOTE — CARE COORDINATION
[facility] Transitions Initial Follow Up Call    Call within 2 business days of discharge: Yes    Patient: Jarad Dominguez Patient : 1955    MRN: [id number]  Reason for Admission: Small bowel obstruction     Discharge Date: 22 RARS: Readmission Risk Score: 11.8 ( )      Last Discharge [facility]       Complaint Diagnosis Description Type Department Provider    22 Abdominal Pain Abdominal pain, right lower quadrant . .. ED to Hosp-Admission (Discharged) (ADMITTED) [facility] 2 Jaqueline Burciaga MD; John Dodson. .. Spoke with: Gurpreet Morillo who states he is doing pretty good today. Denies chest pain, SOB, fever, chills. Constipation. Has loose stools, normal bladder elimination. Medications reviewed and taking as directed. patient has f/u with PCP tomorrow. No concerns at present time. Facility: [facility]    Non-face-to-face services provided:  Obtained and reviewed discharge summary and/or continuity of care documents  Transitions of Care Initial Call    Was this an external facility discharge? No     Challenges to be reviewed by the provider   Additional needs identified to be addressed with provider: No  none             Method of communication with provider : none      Advance Care Planning:   Does patient have an Advance Directive: reviewed and current, reviewed and needs to be updated, not on file; education provided, patient declined education, decision maker updated and referral to internal ACP facilitator. Was this a readmission? No   Patient stated reason for admission: Small bowel obstruction     Patients top risk factors for readmission: lack of knowledge about disease  medication management    Care Transition Nurse (CTN) contacted the patient by telephone to perform post hospital discharge assessment. Verified name and  with patient as identifiers. Provided introduction to self, and explanation of the CTN role.      CTN reviewed discharge instructions, medical action plan and red flags with patient who verbalized understanding. Patient given an opportunity to ask questions and does not have any further questions or concerns at this time. Were discharge instructions available to patient? Yes. Reviewed appropriate site of care based on symptoms and resources available to patient including: PCP and Specialist. The patient agrees to contact the PCP office for questions related to their healthcare. Medication reconciliation was performed with patient, who verbalizes understanding of administration of home medications. Advised obtaining a 90-day supply of all daily and as-needed medications. Covid Risk Education     Educated patient about risk for severe COVID-19 due to risk factors according to CDC guidelines. LPN CC reviewed discharge instructions, medical action plan and red flag symptoms with the patient who verbalized understanding. Discussed COVID vaccination status: Yes. Education provided on COVID-19 vaccination as appropriate. Discussed exposure protocols and quarantine with CDC Guidelines. Patient was given an opportunity to verbalize any questions and concerns and agrees to contact LPN CC or health care provider for questions related to their healthcare. Reviewed and educated patient on any new and changed medications related to discharge diagnosis. Was patient discharged with a pulse oximeter? No Discussed and confirmed pulse oximeter discharge instructions and when to notify provider or seek emergency care. LPN CC provided contact information. No further follow-up call indicated based on severity of symptoms and risk factors.       Care Transitions 24 Hour Call    Do you have any ongoing symptoms?: Yes  Patient-reported symptoms: Other (Comment: loose stools)  Interventions for patient-reported symptoms: Other  Do you have a copy of your discharge instructions?: Yes  Do you have all of your prescriptions and are they filled?: Yes  Have you been contacted by a [employer]?: No  Have you scheduled your follow up appointment?: Yes  How are you going to get to your appointment?: Car - drive self  Were you discharged with any Home Care or Post Acute Services: No  Do you feel like you have everything you need to keep you well at home?: Yes  Care Transitions Interventions         Follow Up  No future appointments.     Jaime Reid LPN

## 2022-02-04 NOTE — DISCHARGE SUMMARY
[facility]     Department of Internal Medicine - Staff Internal Medicine Teaching Service    INPATIENT DISCHARGE SUMMARY      Patient Identification:  Chasity Merida is a 78 y.o. male. :  1955  MRN: [id number]     Acct: [de-identified]   PCP: ROSLYN Martinez CNP  Admit Date:  2022  Discharge date and time: 2022  6:00 PM   Attending Provider: No att. providers found                                     [address] Problem Lists:  Principal Problem:    Small bowel obstruction ([address]  Active Problems:    Adenocarcinoma of prostate ([address]    Morbid obesity due to excess calories ([address]    Gastroesophageal reflux disease without esophagitis    Mixed hyperlipidemia    Essential hypertension    Intestinal volvulus (HCC)    Ventral incisional hernia    Atrial fibrillation, controlled ([address]    S/P prostatectomy    Abdominal pain, right lower quadrant  Resolved Problems:    * No resolved hospital problems. [facility] STAY     Brief Inpatient course:   Chasity Merida is a 78 y.o. male who was admitted for the management of Small bowel obstruction [facility]), referred from the urgent care to the emergency department for the concern of small bowel obstruction. Patient was complaining of lower abdominal pain, constipation with severe nausea for the last couple of days. CT abdomen pelvis with IV contrast showed acute small bowel obstruction due to focal periumbilical ventral abdominal wall hernia just left of the midline, sigmoid diverticulosis. Nonobstructing bilateral renal calculi. General surgery was consulted for SBO. Recommend conservative management including NG tube placement with suction. Subsequently, patient was started on clear liquid diet and he tolerated well.   Small bowel follow-through imaging revealed normal transit time is contrast reaches the colon by 60 minutes with no gross focal abnormalities, strictures or obstruction within the small bowel. His diet was advanced to regular, which he tolerated well. General surgery cleared him for discharge. Of note, his past medical history significant for hypertension, atrial fibrillation, prostate cancer status post prostatectomy, history of small bowel obstruction. His home medications were resumed. And he is discharged in stable condition. Procedures/ Significant Interventions:    NG tube with suction.   Small bowel follow-through    Consults:     Consults:     Final Specialist Recommendations/Findings:   IP CONSULT TO GENERAL SURGERY  IP CONSULT TO HOSPITALIST  IP CONSULT TO INTERNAL MEDICINE  IP CONSULT TO CASE MANAGEMENT  PHARMACY TO DOSE WARFARIN      Any Hospital Acquired Infections: none    Discharge Functional Status:  stable    DISCHARGE PLAN     Disposition: home    Patient Instructions:   Discharge Medication List as of 2/2/2022  5:30 PM      START taking these medications    Details   metoprolol succinate (TOPROL XL) 50 MG extended release tablet Take 1 tablet by mouth daily, Disp-30 tablet, R-3Normal      warfarin (COUMADIN) 5 MG tablet Take Coumadin daily as per Coumadin clinic follow up., Disp-30 tablet, R-0Normal         CONTINUE these medications which have NOT CHANGED    Details   atorvastatin (LIPITOR) 20 MG tablet Take 20 mg by mouthHistorical Med      ibuprofen (ADVIL;MOTRIN) 400 MG tablet Take 1 tablet by mouth 3 times daily (with meals), Disp-120 tablet, R-3      pantoprazole (PROTONIX) 40 MG tablet   daily       VESICARE 5 MG tablet   daily       tamsulosin (FLOMAX) 0.4 MG capsule   daily          STOP taking these medications       apixaban (ELIQUIS) 5 MG TABS tablet Comments:   Reason for Stopping:         predniSONE (DELTASONE) 20 MG tablet Comments:   Reason for Stopping:               Activity: activity as tolerated    Diet: regular diet    Follow-up:    [facility] [place]  [address] [address]  [phone]  Schedule an appointment as soon as possible for a visit in 1 month  follow up in Surgery Clinic for Scout Walsh, APRN - [facility]  [address]  [address]  [phone]    Schedule an appointment as soon as possible for a visit in 2 weeks  Post hospitalization follow up. Coumadin Clinic    Schedule an appointment as soon as possible for a visit in 2 days  Please follow up for your appointment on Friday for INR check and dosage recommendation. Patient Instructions:   Please continue to take Toprol XL 50 mg daily and Coumadin as advised. Please follow up with Coumadin clinic on Friday for INR check and for dosage adjustment. Please follow up with Cardiology and PCP. Please follow up with General Surgery as per scheduled appointment for follow up of SBO. Advance diet slowly to solid food. Follow up labs: PT/INR on Friday in Coumadin clinic. Follow up imaging: None  Note that over 30 minutes was spent in preparing discharge papers, discussing discharge with patient, medication review, etc.      Ousmane Vargas MD,   Internal Medicine Resident, PGY-1  [address], [place]  2/3/2022, 8:07 PM

## 2022-02-19 ENCOUNTER — HOSPITAL ENCOUNTER (EMERGENCY)
Facility: HOSPITAL | Age: 67
Discharge: HOME | End: 2022-02-19
Attending: EMERGENCY MEDICINE
Payer: MEDICARE

## 2022-02-19 ENCOUNTER — APPOINTMENT (OUTPATIENT)
Dept: CT IMAGING | Facility: HOSPITAL | Age: 67
End: 2022-02-19
Attending: EMERGENCY MEDICINE
Payer: MEDICARE

## 2022-02-19 VITALS
WEIGHT: 150 LBS | SYSTOLIC BLOOD PRESSURE: 166 MMHG | TEMPERATURE: 97 F | HEART RATE: 79 BPM | DIASTOLIC BLOOD PRESSURE: 85 MMHG | RESPIRATION RATE: 18 BRPM | BODY MASS INDEX: 21 KG/M2 | OXYGEN SATURATION: 98 % | HEIGHT: 71 IN

## 2022-02-19 DIAGNOSIS — S02.2XXA CLOSED FRACTURE OF NASAL BONE, INITIAL ENCOUNTER: Primary | ICD-10-CM

## 2022-02-19 DIAGNOSIS — S09.8XXA BLUNT HEAD TRAUMA, INITIAL ENCOUNTER: ICD-10-CM

## 2022-02-19 LAB
BASOPHILS # BLD AUTO: 0.01 X10(3) UL (ref 0–0.2)
BASOPHILS NFR BLD AUTO: 0.1 %
BUN BLD-MCNC: 18 MG/DL (ref 7–18)
BUN/CREAT SERPL: 15.4 (ref 10–20)
CALCIUM BLD-MCNC: 9.5 MG/DL (ref 8.5–10.1)
CHLORIDE SERPL-SCNC: 108 MMOL/L (ref 98–112)
CO2 SERPL-SCNC: 28 MMOL/L (ref 21–32)
CREAT BLD-MCNC: 1.17 MG/DL
DEPRECATED RDW RBC AUTO: 44.3 FL (ref 35.1–46.3)
EOSINOPHIL # BLD AUTO: 0.04 X10(3) UL (ref 0–0.7)
EOSINOPHIL NFR BLD AUTO: 0.4 %
ERYTHROCYTE [DISTWIDTH] IN BLOOD BY AUTOMATED COUNT: 12.6 % (ref 11–15)
GLUCOSE BLD-MCNC: 90 MG/DL (ref 70–99)
HCT VFR BLD AUTO: 37.9 %
HGB BLD-MCNC: 12.3 G/DL
IMM GRANULOCYTES # BLD AUTO: 0.01 X10(3) UL (ref 0–1)
IMM GRANULOCYTES NFR BLD: 0.1 %
LYMPHOCYTES # BLD AUTO: 3.91 X10(3) UL (ref 1–4)
LYMPHOCYTES NFR BLD AUTO: 42.5 %
MCH RBC QN AUTO: 31.4 PG (ref 26–34)
MCHC RBC AUTO-ENTMCNC: 32.5 G/DL (ref 31–37)
MCV RBC AUTO: 96.7 FL
MONOCYTES # BLD AUTO: 0.78 X10(3) UL (ref 0.1–1)
MONOCYTES NFR BLD AUTO: 8.5 %
NEUTROPHILS # BLD AUTO: 4.46 X10 (3) UL (ref 1.5–7.7)
NEUTROPHILS # BLD AUTO: 4.46 X10(3) UL (ref 1.5–7.7)
NEUTROPHILS NFR BLD AUTO: 48.4 %
OSMOLALITY SERPL CALC.SUM OF ELEC: 289 MOSM/KG (ref 275–295)
PLATELET # BLD AUTO: 196 10(3)UL (ref 150–450)
RBC # BLD AUTO: 3.92 X10(6)UL
SODIUM SERPL-SCNC: 139 MMOL/L (ref 136–145)
WBC # BLD AUTO: 9.2 X10(3) UL (ref 4–11)

## 2022-02-19 PROCEDURE — 99284 EMERGENCY DEPT VISIT MOD MDM: CPT

## 2022-02-19 PROCEDURE — 80048 BASIC METABOLIC PNL TOTAL CA: CPT | Performed by: EMERGENCY MEDICINE

## 2022-02-19 PROCEDURE — 70486 CT MAXILLOFACIAL W/O DYE: CPT | Performed by: EMERGENCY MEDICINE

## 2022-02-19 PROCEDURE — 70450 CT HEAD/BRAIN W/O DYE: CPT | Performed by: EMERGENCY MEDICINE

## 2022-02-19 PROCEDURE — 85025 COMPLETE CBC W/AUTO DIFF WBC: CPT | Performed by: EMERGENCY MEDICINE

## 2022-02-19 PROCEDURE — 93005 ELECTROCARDIOGRAM TRACING: CPT

## 2022-02-19 PROCEDURE — 93010 ELECTROCARDIOGRAM REPORT: CPT | Performed by: EMERGENCY MEDICINE

## 2022-02-19 PROCEDURE — 36415 COLL VENOUS BLD VENIPUNCTURE: CPT

## 2022-02-19 RX ORDER — POLYETHYLENE GLYCOL 3350 17 G/17G
17 POWDER, FOR SOLUTION ORAL DAILY PRN
COMMUNITY

## 2022-02-19 RX ORDER — CARBAMAZEPINE 200 MG/1
200 TABLET, EXTENDED RELEASE ORAL 2 TIMES DAILY
COMMUNITY

## 2022-02-19 RX ORDER — LISINOPRIL 30 MG/1
30 TABLET ORAL DAILY
COMMUNITY

## 2022-02-19 RX ORDER — TAMSULOSIN HYDROCHLORIDE 0.4 MG/1
0.4 CAPSULE ORAL DAILY
COMMUNITY

## 2022-02-19 NOTE — ED INITIAL ASSESSMENT (HPI)
Pt arrived via EMS from the [facility], pt was found on the bathroom floor, pt was on the toilet and passed out. Pt was found   per nursing home. Pt has a lac on right cheek and eyebrow.

## 2022-07-26 RX ORDER — METOPROLOL SUCCINATE 50 MG/1
TABLET, EXTENDED RELEASE ORAL
Qty: 90 TABLET | Refills: 0 | OUTPATIENT
Start: 2022-07-26

## 2022-07-28 RX ORDER — METOPROLOL SUCCINATE 50 MG/1
TABLET, EXTENDED RELEASE ORAL
Qty: 90 TABLET | Refills: 0 | OUTPATIENT
Start: 2022-07-28

## 2022-09-21 ENCOUNTER — LAB REQUISITION (OUTPATIENT)
Dept: EXTERNAL CLINIC | Age: 67
End: 2022-09-21

## 2022-09-21 DIAGNOSIS — R56.1 POST TRAUMATIC SEIZURES (CMD): ICD-10-CM

## 2022-09-21 PROCEDURE — 80156 ASSAY CARBAMAZEPINE TOTAL: CPT | Performed by: CLINICAL MEDICAL LABORATORY

## 2022-09-21 PROCEDURE — PSEU8244 CARBAMAZEPINE: Performed by: CLINICAL MEDICAL LABORATORY

## 2022-09-22 LAB — CARBAMAZEPINE SERPL-MCNC: 4.5 MCG/ML (ref 4–12)

## 2023-04-18 ENCOUNTER — LAB REQUISITION (OUTPATIENT)
Dept: LAB | Age: 68
End: 2023-04-18

## 2023-04-18 DIAGNOSIS — Z13.9 ENCOUNTER FOR SCREENING, UNSPECIFIED: ICD-10-CM

## 2023-04-18 LAB — CARBAMAZEPINE SERPL-MCNC: 7.2 MCG/ML (ref 4–12)

## 2023-04-18 PROCEDURE — PSEU8244 CARBAMAZEPINE: Performed by: CLINICAL MEDICAL LABORATORY

## 2023-04-18 PROCEDURE — 80156 ASSAY CARBAMAZEPINE TOTAL: CPT | Performed by: CLINICAL MEDICAL LABORATORY

## 2023-06-20 ENCOUNTER — APPOINTMENT (OUTPATIENT)
Dept: GENERAL RADIOLOGY | Facility: HOSPITAL | Age: 68
DRG: 481 | End: 2023-06-20
Attending: EMERGENCY MEDICINE
Payer: MEDICARE

## 2023-06-20 ENCOUNTER — APPOINTMENT (OUTPATIENT)
Dept: CT IMAGING | Facility: HOSPITAL | Age: 68
End: 2023-06-20
Attending: EMERGENCY MEDICINE
Payer: MEDICARE

## 2023-06-20 ENCOUNTER — APPOINTMENT (OUTPATIENT)
Dept: CT IMAGING | Facility: HOSPITAL | Age: 68
DRG: 481 | End: 2023-06-20
Attending: EMERGENCY MEDICINE
Payer: MEDICARE

## 2023-06-20 ENCOUNTER — HOSPITAL ENCOUNTER (INPATIENT)
Facility: HOSPITAL | Age: 68
LOS: 3 days | Discharge: NURSING, REHAB OR LONG-TERM CARE | DRG: 481 | End: 2023-06-23
Attending: EMERGENCY MEDICINE | Admitting: EMERGENCY MEDICINE
Payer: MEDICARE

## 2023-06-20 ENCOUNTER — HOSPITAL ENCOUNTER (INPATIENT)
Facility: HOSPITAL | Age: 68
LOS: 3 days | Discharge: NURSING, REHAB OR LONG-TERM CARE | End: 2023-06-23
Attending: EMERGENCY MEDICINE | Admitting: INTERNAL MEDICINE
Payer: MEDICARE

## 2023-06-20 ENCOUNTER — HOSPITAL ENCOUNTER (INPATIENT)
Facility: HOSPITAL | Age: 68
LOS: 3 days | Discharge: NURSING, REHAB OR LONG-TERM CARE | DRG: 481 | End: 2023-06-23
Attending: EMERGENCY MEDICINE | Admitting: EMERGENCY MEDICINE
Payer: MEDICARE

## 2023-06-20 ENCOUNTER — APPOINTMENT (OUTPATIENT)
Dept: GENERAL RADIOLOGY | Facility: HOSPITAL | Age: 68
End: 2023-06-20
Attending: EMERGENCY MEDICINE
Payer: MEDICARE

## 2023-06-20 ENCOUNTER — HOSPITAL ENCOUNTER (INPATIENT)
Facility: HOSPITAL | Age: 68
LOS: 3 days | Discharge: NURSING, REHAB OR LONG-TERM CARE | End: 2023-06-23
Attending: EMERGENCY MEDICINE | Admitting: EMERGENCY MEDICINE
Payer: MEDICARE

## 2023-06-20 DIAGNOSIS — S72.002A CLOSED LEFT HIP FRACTURE (HCC): Primary | ICD-10-CM

## 2023-06-20 DIAGNOSIS — S72.002A CLOSED FRACTURE OF LEFT HIP, INITIAL ENCOUNTER (HCC): Primary | ICD-10-CM

## 2023-06-20 LAB
ANION GAP SERPL CALC-SCNC: 4 MMOL/L (ref 0–18)
ANTIBODY SCREEN: NEGATIVE
ATRIAL RATE: 55 BPM
BASOPHILS # BLD AUTO: 0.02 X10(3) UL (ref 0–0.2)
BASOPHILS NFR BLD AUTO: 0.2 %
BILIRUB UR QL: NEGATIVE
BUN BLD-MCNC: 16 MG/DL (ref 7–18)
BUN/CREAT SERPL: 18 (ref 10–20)
CALCIUM BLD-MCNC: 9.1 MG/DL (ref 8.5–10.1)
CHLORIDE SERPL-SCNC: 107 MMOL/L (ref 98–112)
CO2 SERPL-SCNC: 30 MMOL/L (ref 21–32)
COLOR UR: YELLOW
CREAT BLD-MCNC: 0.89 MG/DL
DEPRECATED RDW RBC AUTO: 43.2 FL (ref 35.1–46.3)
EOSINOPHIL # BLD AUTO: 0.08 X10(3) UL (ref 0–0.7)
EOSINOPHIL NFR BLD AUTO: 0.9 %
ERYTHROCYTE [DISTWIDTH] IN BLOOD BY AUTOMATED COUNT: 12.4 % (ref 11–15)
GFR SERPLBLD BASED ON 1.73 SQ M-ARVRAT: 93 ML/MIN/1.73M2 (ref 60–?)
GLUCOSE BLD-MCNC: 89 MG/DL (ref 70–99)
GLUCOSE UR-MCNC: NORMAL MG/DL
HCT VFR BLD AUTO: 36 %
HGB BLD-MCNC: 11.9 G/DL
IMM GRANULOCYTES # BLD AUTO: 0.02 X10(3) UL (ref 0–1)
IMM GRANULOCYTES NFR BLD: 0.2 %
LEUKOCYTE ESTERASE UR QL STRIP.AUTO: NEGATIVE
LYMPHOCYTES # BLD AUTO: 3.38 X10(3) UL (ref 1–4)
LYMPHOCYTES NFR BLD AUTO: 38 %
MCH RBC QN AUTO: 31.2 PG (ref 26–34)
MCHC RBC AUTO-ENTMCNC: 33.1 G/DL (ref 31–37)
MCV RBC AUTO: 94.2 FL
MONOCYTES # BLD AUTO: 0.81 X10(3) UL (ref 0.1–1)
MONOCYTES NFR BLD AUTO: 9.1 %
MRSA NASAL: NEGATIVE
NEUTROPHILS # BLD AUTO: 4.58 X10 (3) UL (ref 1.5–7.7)
NEUTROPHILS # BLD AUTO: 4.58 X10(3) UL (ref 1.5–7.7)
NEUTROPHILS NFR BLD AUTO: 51.6 %
NITRITE UR QL STRIP.AUTO: NEGATIVE
OSMOLALITY SERPL CALC.SUM OF ELEC: 293 MOSM/KG (ref 275–295)
P AXIS: 78 DEGREES
P-R INTERVAL: 152 MS
PH UR: 6 [PH] (ref 5–8)
PLATELET # BLD AUTO: 191 10(3)UL (ref 150–450)
POTASSIUM SERPL-SCNC: 3.9 MMOL/L (ref 3.5–5.1)
PROT UR-MCNC: 20 MG/DL
Q-T INTERVAL: 448 MS
QRS DURATION: 72 MS
QTC CALCULATION (BEZET): 428 MS
R AXIS: 72 DEGREES
RBC # BLD AUTO: 3.82 X10(6)UL
RH BLOOD TYPE: POSITIVE
RH BLOOD TYPE: POSITIVE
SODIUM SERPL-SCNC: 141 MMOL/L (ref 136–145)
SP GR UR STRIP: 1.02 (ref 1–1.03)
STAPH A BY PCR: NEGATIVE
T AXIS: 77 DEGREES
UROBILINOGEN UR STRIP-ACNC: 2
VENTRICULAR RATE: 55 BPM
WBC # BLD AUTO: 8.9 X10(3) UL (ref 4–11)

## 2023-06-20 PROCEDURE — 86901 BLOOD TYPING SEROLOGIC RH(D): CPT | Performed by: EMERGENCY MEDICINE

## 2023-06-20 PROCEDURE — 85025 COMPLETE CBC W/AUTO DIFF WBC: CPT | Performed by: EMERGENCY MEDICINE

## 2023-06-20 PROCEDURE — 73502 X-RAY EXAM HIP UNI 2-3 VIEWS: CPT | Performed by: EMERGENCY MEDICINE

## 2023-06-20 PROCEDURE — 99285 EMERGENCY DEPT VISIT HI MDM: CPT

## 2023-06-20 PROCEDURE — 80048 BASIC METABOLIC PNL TOTAL CA: CPT

## 2023-06-20 PROCEDURE — 71045 X-RAY EXAM CHEST 1 VIEW: CPT | Performed by: EMERGENCY MEDICINE

## 2023-06-20 PROCEDURE — 86900 BLOOD TYPING SEROLOGIC ABO: CPT | Performed by: EMERGENCY MEDICINE

## 2023-06-20 PROCEDURE — 72125 CT NECK SPINE W/O DYE: CPT | Performed by: EMERGENCY MEDICINE

## 2023-06-20 PROCEDURE — 85025 COMPLETE CBC W/AUTO DIFF WBC: CPT

## 2023-06-20 PROCEDURE — 96374 THER/PROPH/DIAG INJ IV PUSH: CPT

## 2023-06-20 PROCEDURE — 81001 URINALYSIS AUTO W/SCOPE: CPT | Performed by: INTERNAL MEDICINE

## 2023-06-20 PROCEDURE — 86850 RBC ANTIBODY SCREEN: CPT | Performed by: EMERGENCY MEDICINE

## 2023-06-20 PROCEDURE — 93005 ELECTROCARDIOGRAM TRACING: CPT

## 2023-06-20 PROCEDURE — 70450 CT HEAD/BRAIN W/O DYE: CPT | Performed by: EMERGENCY MEDICINE

## 2023-06-20 PROCEDURE — 87641 MR-STAPH DNA AMP PROBE: CPT | Performed by: EMERGENCY MEDICINE

## 2023-06-20 PROCEDURE — 80048 BASIC METABOLIC PNL TOTAL CA: CPT | Performed by: EMERGENCY MEDICINE

## 2023-06-20 PROCEDURE — 87640 STAPH A DNA AMP PROBE: CPT | Performed by: EMERGENCY MEDICINE

## 2023-06-20 PROCEDURE — 93010 ELECTROCARDIOGRAM REPORT: CPT

## 2023-06-20 PROCEDURE — 73552 X-RAY EXAM OF FEMUR 2/>: CPT | Performed by: EMERGENCY MEDICINE

## 2023-06-20 RX ORDER — SERTRALINE HYDROCHLORIDE 25 MG/1
12.5 TABLET, FILM COATED ORAL DAILY
COMMUNITY

## 2023-06-20 RX ORDER — CARBAMAZEPINE 200 MG/1
200 TABLET, EXTENDED RELEASE ORAL 2 TIMES DAILY
Status: DISCONTINUED | OUTPATIENT
Start: 2023-06-20 | End: 2023-06-23

## 2023-06-20 RX ORDER — FINASTERIDE 5 MG/1
5 TABLET, FILM COATED ORAL DAILY
Status: DISCONTINUED | OUTPATIENT
Start: 2023-06-20 | End: 2023-06-23

## 2023-06-20 RX ORDER — MORPHINE SULFATE 2 MG/ML
2 INJECTION, SOLUTION INTRAMUSCULAR; INTRAVENOUS ONCE
Status: COMPLETED | OUTPATIENT
Start: 2023-06-20 | End: 2023-06-20

## 2023-06-20 RX ORDER — SENNOSIDES 8.6 MG
8.6 TABLET ORAL 2 TIMES DAILY
Status: DISCONTINUED | OUTPATIENT
Start: 2023-06-20 | End: 2023-06-23

## 2023-06-20 RX ORDER — TAMSULOSIN HYDROCHLORIDE 0.4 MG/1
0.4 CAPSULE ORAL DAILY
Status: DISCONTINUED | OUTPATIENT
Start: 2023-06-20 | End: 2023-06-23

## 2023-06-20 RX ORDER — SERTRALINE HYDROCHLORIDE 25 MG/1
12.5 TABLET, FILM COATED ORAL DAILY
Status: DISCONTINUED | OUTPATIENT
Start: 2023-06-20 | End: 2023-06-23

## 2023-06-20 RX ORDER — SENNOSIDES 8.6 MG
8.6 TABLET ORAL 2 TIMES DAILY
COMMUNITY

## 2023-06-20 RX ORDER — POLYETHYLENE GLYCOL 3350 17 G/17G
17 POWDER, FOR SOLUTION ORAL DAILY PRN
Status: DISCONTINUED | OUTPATIENT
Start: 2023-06-20 | End: 2023-06-23

## 2023-06-20 RX ORDER — MORPHINE SULFATE 2 MG/ML
2 INJECTION, SOLUTION INTRAMUSCULAR; INTRAVENOUS EVERY 4 HOURS PRN
Status: DISCONTINUED | OUTPATIENT
Start: 2023-06-20 | End: 2023-06-23

## 2023-06-20 RX ORDER — FINASTERIDE 5 MG/1
5 TABLET, FILM COATED ORAL DAILY
COMMUNITY

## 2023-06-20 NOTE — ED INITIAL ASSESSMENT (HPI)
Unwitnessed fall in nursing home, per pt they just tripped and fell. EMS found pt on ground. C collar in place. Left leg appears to be shorter and patient c/o of severe pain in the left hip. Denies hitting head, or neck pain, no blood thinners.  AOx1 baseline,

## 2023-06-20 NOTE — ED QUICK NOTES
Orders for admission. Patient and/or next of kin aware of plan and is ready to go upstairs. Please call ED RN Sim Luna at listed extension should you have any further questions or concerns. Thank you. Nurse and Kindra Cho RN, [phone]    Chief Presentation: FALL    Admission Diagnosis: CLOSED FRACTURE OF LEFT HIP    Admitting/Attending Physician: CONSUELO    Neuro: A&OX1 (BASELINE)    Cardiac: SB/SR    Resp: ROOM AIR    GI: WNL    : WNL    Skin/IV: L AC (20) PATENT AND SALINE LOCKED.     Other Pertinent Information: UA (WHEN PT ABLE TO PROVIDE)    Type of COVID Test Sent: N/A    COVID Level of Suspicion: LOW    PRIMARY CARE PARTNER:

## 2023-06-20 NOTE — PROGRESS NOTES
ORTHO    Full consult to follow. Patient with a mildly displaced left IT fracture. Will need surgery to fix the fracture when cleared. Will plan on surgery tomorrow after a thorough discussion with the patient and medical clearance is obtained.     Surya Palumbo MD

## 2023-06-20 NOTE — PLAN OF CARE
Problem: Patient Centered Care  Goal: Patient preferences are identified and integrated in the patient's plan of care  Description: Interventions:  - What would you like us to know as we care for you? I want to be able to walk again.  - Provide timely, complete, and accurate information to patient/family  - Incorporate patient and family knowledge, values, beliefs, and cultural backgrounds into the planning and delivery of care  - Encourage patient/family to participate in care and decision-making at the level they choose  - Honor patient and family perspectives and choices  Outcome: Progressing   Patient can have pain medication eery 4 hours. Ortho consult for possible surgery for fracture.

## 2023-06-21 ENCOUNTER — APPOINTMENT (OUTPATIENT)
Dept: GENERAL RADIOLOGY | Facility: HOSPITAL | Age: 68
DRG: 481 | End: 2023-06-21
Attending: ORTHOPAEDIC SURGERY
Payer: MEDICARE

## 2023-06-21 ENCOUNTER — APPOINTMENT (OUTPATIENT)
Dept: GENERAL RADIOLOGY | Facility: HOSPITAL | Age: 68
End: 2023-06-21
Attending: ORTHOPAEDIC SURGERY
Payer: MEDICARE

## 2023-06-21 ENCOUNTER — ANESTHESIA EVENT (OUTPATIENT)
Dept: SURGERY | Facility: HOSPITAL | Age: 68
End: 2023-06-21
Payer: MEDICARE

## 2023-06-21 ENCOUNTER — ANESTHESIA (OUTPATIENT)
Dept: SURGERY | Facility: HOSPITAL | Age: 68
End: 2023-06-21
Payer: MEDICARE

## 2023-06-21 LAB
DEPRECATED RDW RBC AUTO: 42.5 FL (ref 35.1–46.3)
ERYTHROCYTE [DISTWIDTH] IN BLOOD BY AUTOMATED COUNT: 12.6 % (ref 11–15)
HCT VFR BLD AUTO: 31.7 %
HGB BLD-MCNC: 10.7 G/DL
MCH RBC QN AUTO: 31.2 PG (ref 26–34)
MCHC RBC AUTO-ENTMCNC: 33.8 G/DL (ref 31–37)
MCV RBC AUTO: 92.4 FL
PLATELET # BLD AUTO: 183 10(3)UL (ref 150–450)
RBC # BLD AUTO: 3.43 X10(6)UL
WBC # BLD AUTO: 6.9 X10(3) UL (ref 4–11)

## 2023-06-21 PROCEDURE — 76000 FLUOROSCOPY <1 HR PHYS/QHP: CPT | Performed by: ORTHOPAEDIC SURGERY

## 2023-06-21 PROCEDURE — 0QS706Z REPOSITION LEFT UPPER FEMUR WITH INTRAMEDULLARY INTERNAL FIXATION DEVICE, OPEN APPROACH: ICD-10-PCS | Performed by: ORTHOPAEDIC SURGERY

## 2023-06-21 PROCEDURE — 85027 COMPLETE CBC AUTOMATED: CPT | Performed by: INTERNAL MEDICINE

## 2023-06-21 DEVICE — IMPLANTABLE DEVICE: Type: IMPLANTABLE DEVICE | Site: HIP | Status: FUNCTIONAL

## 2023-06-21 RX ORDER — DIPHENHYDRAMINE HCL 25 MG
25 CAPSULE ORAL EVERY 4 HOURS PRN
Status: DISCONTINUED | OUTPATIENT
Start: 2023-06-21 | End: 2023-06-23

## 2023-06-21 RX ORDER — CEFAZOLIN SODIUM/WATER 2 G/20 ML
2 SYRINGE (ML) INTRAVENOUS ONCE
Status: COMPLETED | OUTPATIENT
Start: 2023-06-21 | End: 2023-06-21

## 2023-06-21 RX ORDER — SODIUM CHLORIDE, SODIUM LACTATE, POTASSIUM CHLORIDE, CALCIUM CHLORIDE 600; 310; 30; 20 MG/100ML; MG/100ML; MG/100ML; MG/100ML
INJECTION, SOLUTION INTRAVENOUS CONTINUOUS
Status: DISCONTINUED | OUTPATIENT
Start: 2023-06-21 | End: 2023-06-21 | Stop reason: HOSPADM

## 2023-06-21 RX ORDER — DIPHENHYDRAMINE HYDROCHLORIDE 50 MG/ML
12.5 INJECTION INTRAMUSCULAR; INTRAVENOUS EVERY 4 HOURS PRN
Status: DISCONTINUED | OUTPATIENT
Start: 2023-06-21 | End: 2023-06-23

## 2023-06-21 RX ORDER — EPHEDRINE SULFATE 50 MG/ML
INJECTION, SOLUTION INTRAVENOUS AS NEEDED
Status: DISCONTINUED | OUTPATIENT
Start: 2023-06-21 | End: 2023-06-21 | Stop reason: SURG

## 2023-06-21 RX ORDER — ENOXAPARIN SODIUM 100 MG/ML
40 INJECTION SUBCUTANEOUS DAILY
Status: DISCONTINUED | OUTPATIENT
Start: 2023-06-22 | End: 2023-06-23

## 2023-06-21 RX ORDER — HYDROMORPHONE HYDROCHLORIDE 1 MG/ML
0.6 INJECTION, SOLUTION INTRAMUSCULAR; INTRAVENOUS; SUBCUTANEOUS EVERY 5 MIN PRN
Status: DISCONTINUED | OUTPATIENT
Start: 2023-06-21 | End: 2023-06-21 | Stop reason: HOSPADM

## 2023-06-21 RX ORDER — ENEMA 19; 7 G/133ML; G/133ML
1 ENEMA RECTAL ONCE AS NEEDED
Status: DISCONTINUED | OUTPATIENT
Start: 2023-06-21 | End: 2023-06-23

## 2023-06-21 RX ORDER — HYDROMORPHONE HYDROCHLORIDE 1 MG/ML
0.4 INJECTION, SOLUTION INTRAMUSCULAR; INTRAVENOUS; SUBCUTANEOUS EVERY 5 MIN PRN
Status: DISCONTINUED | OUTPATIENT
Start: 2023-06-21 | End: 2023-06-21 | Stop reason: HOSPADM

## 2023-06-21 RX ORDER — METOCLOPRAMIDE HYDROCHLORIDE 5 MG/ML
10 INJECTION INTRAMUSCULAR; INTRAVENOUS EVERY 8 HOURS PRN
Status: DISCONTINUED | OUTPATIENT
Start: 2023-06-21 | End: 2023-06-23

## 2023-06-21 RX ORDER — ACETAMINOPHEN 500 MG
1000 TABLET ORAL EVERY 8 HOURS PRN
Status: DISCONTINUED | OUTPATIENT
Start: 2023-06-21 | End: 2023-06-23

## 2023-06-21 RX ORDER — DOCUSATE SODIUM 100 MG/1
100 CAPSULE, LIQUID FILLED ORAL 2 TIMES DAILY
Status: DISCONTINUED | OUTPATIENT
Start: 2023-06-21 | End: 2023-06-23

## 2023-06-21 RX ORDER — HYDROCODONE BITARTRATE AND ACETAMINOPHEN 10; 325 MG/1; MG/1
1 TABLET ORAL EVERY 4 HOURS PRN
Status: DISCONTINUED | OUTPATIENT
Start: 2023-06-21 | End: 2023-06-23

## 2023-06-21 RX ORDER — MORPHINE SULFATE 4 MG/ML
4 INJECTION, SOLUTION INTRAMUSCULAR; INTRAVENOUS EVERY 10 MIN PRN
Status: DISCONTINUED | OUTPATIENT
Start: 2023-06-21 | End: 2023-06-21 | Stop reason: HOSPADM

## 2023-06-21 RX ORDER — MORPHINE SULFATE 4 MG/ML
2 INJECTION, SOLUTION INTRAMUSCULAR; INTRAVENOUS EVERY 10 MIN PRN
Status: DISCONTINUED | OUTPATIENT
Start: 2023-06-21 | End: 2023-06-21 | Stop reason: HOSPADM

## 2023-06-21 RX ORDER — DEXAMETHASONE SODIUM PHOSPHATE 4 MG/ML
VIAL (ML) INJECTION AS NEEDED
Status: DISCONTINUED | OUTPATIENT
Start: 2023-06-21 | End: 2023-06-21 | Stop reason: SURG

## 2023-06-21 RX ORDER — BISACODYL 10 MG
10 SUPPOSITORY, RECTAL RECTAL
Status: DISCONTINUED | OUTPATIENT
Start: 2023-06-21 | End: 2023-06-23

## 2023-06-21 RX ORDER — ASPIRIN 325 MG
325 TABLET, DELAYED RELEASE (ENTERIC COATED) ORAL 2 TIMES DAILY
Status: CANCELLED | OUTPATIENT
Start: 2023-06-21

## 2023-06-21 RX ORDER — SODIUM CHLORIDE, SODIUM LACTATE, POTASSIUM CHLORIDE, CALCIUM CHLORIDE 600; 310; 30; 20 MG/100ML; MG/100ML; MG/100ML; MG/100ML
INJECTION, SOLUTION INTRAVENOUS CONTINUOUS PRN
Status: DISCONTINUED | OUTPATIENT
Start: 2023-06-21 | End: 2023-06-21 | Stop reason: SURG

## 2023-06-21 RX ORDER — PHENYLEPHRINE HCL 10 MG/ML
VIAL (ML) INJECTION AS NEEDED
Status: DISCONTINUED | OUTPATIENT
Start: 2023-06-21 | End: 2023-06-21 | Stop reason: SURG

## 2023-06-21 RX ORDER — HYDROMORPHONE HYDROCHLORIDE 1 MG/ML
0.2 INJECTION, SOLUTION INTRAMUSCULAR; INTRAVENOUS; SUBCUTANEOUS EVERY 5 MIN PRN
Status: DISCONTINUED | OUTPATIENT
Start: 2023-06-21 | End: 2023-06-21 | Stop reason: HOSPADM

## 2023-06-21 RX ORDER — MORPHINE SULFATE 10 MG/ML
6 INJECTION, SOLUTION INTRAMUSCULAR; INTRAVENOUS EVERY 10 MIN PRN
Status: DISCONTINUED | OUTPATIENT
Start: 2023-06-21 | End: 2023-06-21 | Stop reason: HOSPADM

## 2023-06-21 RX ORDER — DIPHENHYDRAMINE HYDROCHLORIDE 50 MG/ML
25 INJECTION INTRAMUSCULAR; INTRAVENOUS ONCE AS NEEDED
Status: ACTIVE | OUTPATIENT
Start: 2023-06-21 | End: 2023-06-21

## 2023-06-21 RX ORDER — LIDOCAINE HYDROCHLORIDE 10 MG/ML
INJECTION, SOLUTION EPIDURAL; INFILTRATION; INTRACAUDAL; PERINEURAL AS NEEDED
Status: DISCONTINUED | OUTPATIENT
Start: 2023-06-21 | End: 2023-06-21 | Stop reason: SURG

## 2023-06-21 RX ORDER — SENNOSIDES 8.6 MG
17.2 TABLET ORAL NIGHTLY
Status: DISCONTINUED | OUTPATIENT
Start: 2023-06-21 | End: 2023-06-23

## 2023-06-21 RX ORDER — CYCLOBENZAPRINE HCL 10 MG
10 TABLET ORAL EVERY 8 HOURS PRN
Status: DISCONTINUED | OUTPATIENT
Start: 2023-06-21 | End: 2023-06-23

## 2023-06-21 RX ORDER — NALOXONE HYDROCHLORIDE 0.4 MG/ML
80 INJECTION, SOLUTION INTRAMUSCULAR; INTRAVENOUS; SUBCUTANEOUS AS NEEDED
Status: DISCONTINUED | OUTPATIENT
Start: 2023-06-21 | End: 2023-06-21 | Stop reason: HOSPADM

## 2023-06-21 RX ORDER — ONDANSETRON 2 MG/ML
INJECTION INTRAMUSCULAR; INTRAVENOUS AS NEEDED
Status: DISCONTINUED | OUTPATIENT
Start: 2023-06-21 | End: 2023-06-21 | Stop reason: SURG

## 2023-06-21 RX ORDER — ONDANSETRON 2 MG/ML
4 INJECTION INTRAMUSCULAR; INTRAVENOUS EVERY 6 HOURS PRN
Status: DISCONTINUED | OUTPATIENT
Start: 2023-06-21 | End: 2023-06-23

## 2023-06-21 RX ADMIN — CEFAZOLIN SODIUM/WATER 2 G: 2 G/20 ML SYRINGE (ML) INTRAVENOUS at 11:17:00

## 2023-06-21 RX ADMIN — EPHEDRINE SULFATE 10 MG: 50 INJECTION, SOLUTION INTRAVENOUS at 11:38:00

## 2023-06-21 RX ADMIN — PHENYLEPHRINE HCL 100 MCG: 10 MG/ML VIAL (ML) INJECTION at 11:44:00

## 2023-06-21 RX ADMIN — LIDOCAINE HYDROCHLORIDE 50 MG: 10 INJECTION, SOLUTION EPIDURAL; INFILTRATION; INTRACAUDAL; PERINEURAL at 11:06:00

## 2023-06-21 RX ADMIN — EPHEDRINE SULFATE 10 MG: 50 INJECTION, SOLUTION INTRAVENOUS at 11:33:00

## 2023-06-21 RX ADMIN — ONDANSETRON 4 MG: 2 INJECTION INTRAMUSCULAR; INTRAVENOUS at 11:06:00

## 2023-06-21 RX ADMIN — DEXAMETHASONE SODIUM PHOSPHATE 4 MG: 4 MG/ML VIAL (ML) INJECTION at 11:06:00

## 2023-06-21 RX ADMIN — SODIUM CHLORIDE, SODIUM LACTATE, POTASSIUM CHLORIDE, CALCIUM CHLORIDE: 600; 310; 30; 20 INJECTION, SOLUTION INTRAVENOUS at 11:05:00

## 2023-06-21 NOTE — ANESTHESIA PROCEDURE NOTES
Airway  Date/Time: 6/21/2023 11:12 AM  Urgency: Elective    Airway not difficult    General Information and Staff    Patient location during procedure: OR  Anesthesiologist: Julianna Boone MD  Resident/CRNA: Samantha Andrew CRNA  Performed: CRNA   Performed by: Samantha Andrew CRNA  Authorized by: Julianna Boone MD      Indications and Patient Condition  Indications for airway management: anesthesia  Spontaneous ventilation: present  Sedation level: deep  Preoxygenated: yes  Patient position: sniffing  Mask difficulty assessment: 1 - vent by mask  No planned trial extubation    Final Airway Details  Final airway type: endotracheal airway      Successful airway: ETT  Cuffed: yes   Successful intubation technique: direct laryngoscopy  Endotracheal tube insertion site: oral  Blade: Priscilla  Blade size: #4  ETT size (mm): 7.0    Cormack-Lehane Classification: grade III - view of epiglottis only  Placement verified by: capnometry   Measured from: teeth  ETT to teeth (cm): 22  Number of attempts at approach: 1    Additional Comments  Atraumatic / extremely poor dentition

## 2023-06-21 NOTE — DISCHARGE INSTRUCTIONS
The patient will call for an appointment in the next 2 weeks for follow-up. The patient has been instructed on wound care and may shower if wound is clean and dry. If the wound has drainage then dry dressing changes should be performed daily until the wound is dry. The patient has been instructed to contact the office if increasing drainage, redness, or swelling to the operative wound/extremity occurs. Similarly, if they develop fevers and chills they should call the office ASAP. The patient will continue to wear LOLA hose to both legs for 3 weeks. They may remove them at night or if they are causing skin irritation. Prescribed DVT prophylaxis should be taken for 6 weeks after discharge (as written on prescription). Oral pain medications have been provided and instruction on administration given to the patient. If there are any questions or increasing or uncontrolled pain, the patient should call the office [phone]. The patient will resume a normal diet. They should anticipate a slight decrease in appetite after surgery, but should notify the office if there are any problems with nausea, vomiting, constipation or diarrhea. A stool softner has been ordered and should be taken regularly while on pain medications.

## 2023-06-21 NOTE — PLAN OF CARE
Patient pleasantly confused, very cooperative. Reorientation as needed. Possible hip surgery planned today, MD has to speak to the state guardian for consent. Plan when medically clear to return to [address]. Problem: Patient Centered Care  Goal: Patient preferences are identified and integrated in the patient's plan of care  Description: Interventions:  - What would you like us to know as we care for you?  I FELL DOWN  - Provide timely, complete, and accurate information to patient/family  - Incorporate patient and family knowledge, values, beliefs, and cultural backgrounds into the planning and delivery of care  - Encourage patient/family to participate in care and decision-making at the level they choose  - Honor patient and family perspectives and choices  Outcome: Not Progressing     Problem: Patient/Family Goals  Goal: Patient/Family Long Term Goal  Description: Patient's Long Term Goal: FIX MY LEG    Interventions:  - SURGERY CONSULT  - PAIN MANAGEMENT    - See additional Care Plan goals for specific interventions  Outcome: Not Progressing  Goal: Patient/Family Short Term Goal  Description: Patient's Short Term Goal: NO PAIN IN LEG    Interventions:   - PAIN MANAGEMENT  - SURGICAL CONSULT  -PT/OT WHEN MEDICALLY CLEAR  - See additional Care Plan goals for specific interventions  Outcome: Not Progressing     Problem: SKIN/TISSUE INTEGRITY - ADULT  Goal: Skin integrity remains intact  Description: INTERVENTIONS  - Assess and document risk factors for pressure ulcer development  - Assess and document skin integrity  - Monitor for areas of redness and/or skin breakdown  - Initiate interventions, skin care algorithm/standards of care as needed  Outcome: Progressing  Goal: Incision(s), wounds(s) or drain site(s) healing without S/S of infection  Description: INTERVENTIONS:  - Assess and document risk factors for pressure ulcer development  - Assess and document skin integrity  - Assess and document dressing/incision, wound bed, drain sites and surrounding tissue  - Implement wound care per orders  - Initiate isolation precautions as appropriate  - Initiate Pressure Ulcer prevention bundle as indicated  Outcome: Not Progressing  Goal: Oral mucous membranes remain intact  Description: INTERVENTIONS  - Assess oral mucosa and hygiene practices  - Implement preventative oral hygiene regimen  - Implement oral medicated treatments as ordered  Outcome: Progressing     Problem: MUSCULOSKELETAL - ADULT  Goal: Return mobility to safest level of function  Description: INTERVENTIONS:  - Assess patient stability and activity tolerance for standing, transferring and ambulating w/ or w/o assistive devices  - Assist with transfers and ambulation using safe patient handling equipment as needed  - Ensure adequate protection for wounds/incisions during mobilization  - Obtain PT/OT consults as needed  - Advance activity as appropriate  - Communicate ordered activity level and limitations with patient/family  Outcome: Not Progressing  Goal: Maintain proper alignment of affected body part  Description: INTERVENTIONS:  - Support and protect limb and body alignment per provider's orders  - Instruct and reinforce with patient and family use of appropriate assistive device and precautions (e.g. spinal or hip dislocation precautions)  Outcome: Not Progressing     Problem: NEUROLOGICAL - ADULT  Goal: Achieves stable or improved neurological status  Description: INTERVENTIONS  - Assess for and report changes in neurological status  - Initiate measures to prevent increased intracranial pressure  - Maintain blood pressure and fluid volume within ordered parameters to optimize cerebral perfusion and minimize risk of hemorrhage  - Monitor temperature, glucose, and sodium.  Initiate appropriate interventions as ordered  Outcome: Not Progressing  Goal: Achieves maximal functionality and self care  Description: INTERVENTIONS  - Monitor swallowing and airway patency with patient fatigue and changes in neurological status  - Encourage and assist patient to increase activity and self care with guidance from PT/OT  - Encourage visually impaired, hearing impaired and aphasic patients to use assistive/communication devices  Outcome: Not Progressing     Problem: Impaired Activities of Daily Living  Goal: Achieve highest/safest level of independence in self care  Description: Interventions:  - Assess ability and encourage patient to participate in ADLs to maximize function  - Promote sitting position while performing ADLs such as feeding, grooming, and bathing  - Educate and encourage patient/family in tolerated functional activity level and precautions during self-care    Outcome: Not Progressing     Problem: PAIN - ADULT  Goal: Verbalizes/displays adequate comfort level or patient's stated pain goal  Description: INTERVENTIONS:  - Encourage pt to monitor pain and request assistance  - Assess pain using appropriate pain scale  - Administer analgesics based on type and severity of pain and evaluate response  - Implement non-pharmacological measures as appropriate and evaluate response  - Consider cultural and social influences on pain and pain management  - Manage/alleviate anxiety  - Utilize distraction and/or relaxation techniques  - Monitor for opioid side effects  - Notify MD/LIP if interventions unsuccessful or patient reports new pain  - Anticipate increased pain with activity and pre-medicate as appropriate  Outcome: Not Progressing     Problem: RISK FOR INFECTION - ADULT  Goal: Absence of fever/infection during anticipated neutropenic period  Description: INTERVENTIONS  - Monitor WBC  - Administer growth factors as ordered  - Implement neutropenic guidelines  Outcome: Progressing     Problem: SAFETY ADULT - FALL  Goal: Free from fall injury  Description: INTERVENTIONS:  - Assess pt frequently for physical needs  - Identify cognitive and physical deficits and behaviors that affect risk of falls.   - Rush fall precautions as indicated by assessment.  - Educate pt/family on patient safety including physical limitations  - Instruct pt to call for assistance with activity based on assessment  - Modify environment to reduce risk of injury  - Provide assistive devices as appropriate  - Consider OT/PT consult to assist with strengthening/mobility  - Encourage toileting schedule  Outcome: Progressing     Problem: DISCHARGE PLANNING  Goal: Discharge to home or other facility with appropriate resources  Description: INTERVENTIONS:  - Identify barriers to discharge w/pt and caregiver  - Include patient/family/discharge partner in discharge planning  - Arrange for needed discharge resources and transportation as appropriate  - Identify discharge learning needs (meds, wound care, etc)  - Arrange for interpreters to assist at discharge as needed  - Consider post-discharge preferences of patient/family/discharge partner  - Complete POLST form as appropriate  - Assess patient's ability to be responsible for managing their own health  - Refer to Case Management Department for coordinating discharge planning if the patient needs post-hospital services based on physician/LIP order or complex needs related to functional status, cognitive ability or social support system  Outcome: Not Progressing

## 2023-06-21 NOTE — OPERATIVE REPORT
[facility]    Operative Note    José Antonio Brennan Patient Status:  Inpatient    1932 MRN [id number]   Location [address] [facility] [address] Attending Alma Zaman MD   DOS: 23  PCP No primary care provider on file. Preop DX: left  Intertrochanteric Hip Fracture. Postop DX: Same  Procedure:  left hip IMN of IT hip fracture  Surgeon: Luther Juan MD  Assistants:  Stephan Reyes, NP  Anesthesia: General Anesthesia  EBL: 50 mL  Tourniquet Time: 0 minutes  Fluids: 800 mL  Findings: Left Intertrochanteric Hip Fracture   Specimens: None  Drain: None  Implants: Synthes TFN  Complications: none  All needle and sponge counts correct prior to leaving the operating room. Plan: Transfer to recovery room in stable condition. Transition to floor when stable. INDICATIONS: DX:    José Antonio Brennan is a 75year old year old female with a history of a fall sustaining a left IT hip fracture. We were consulted for operative management of the fracture and the patient and family understand the risks, benefits and alternatives of the surgery. They have been cleared for surgery by their primary care physician for IMN of the left hip. All risks, benefits and alternatives for the procedure have been discussed and informed consent obtained. There are no contraindications to proceed with the surgery today. The risks discussed include but are not limited to: malunion, nonunion, infection, nerve injury, vessel damage, VTE, need for re-operation, loss of limb and loss of life. After all was explained in detail they do understand that with fixation of her fracture we will likely need to use a walker and may drop down a level of function over time. On 23, José Antonio Brennan was identified in the holding and taken to the operating room. After perioperative antibiotics were administered  the patient was given a general anesthetic and placed supine on the operating room table.   The patient was positioned on the fracture table and traction internal rotation was applied to the operative left leg. It was confirmed that we had a good reduction on the AP and lateral fluoroscopic planes we then went ahead and plan to start the surgery. The left leg was then sterilely prepped and draped in standard surgical fashion. A timeout was called and it was noted that the left side was the appropriate site for the surgery and we were allowed to proceed. We then directed our attention to the exposure. APPROACH:    An incision was made over the lateral side of the left hip based upon the fluoroscopic imaging. We dissected sharply down to the level of the deep fascia and incised in line with the incision and then spread bluntly. A guidewire was then introduced to the tip of the greater trochanter. PROCEDURE:    Once exposed we then attempted to address the pathology. The guidewire was then advanced under the AP and lateral fluoroscopic imaging and placed on the shaft of the femur. Our reduction was made during this process and our opening reamer was used to open up the greater trochanter. Once this was done we elected to move forward with a short TFN intramedullary device. Based upon the diameter of the medullary canal we went with a 11 millimeter nail with 130 degree neck angle. The nail was then inserted into the medullary canal confirming our production as we placed it. It was positioned so the lag screw would go to the center of the head on the AP image. At this point time we made a second incision on the lateral aspect of the thigh using a #15 blade. We then bluntly dissected down to the fascia and incised in line with our incision. We then continued our dissection down to the lateral aspect of the femur and inserted our guide for the helical blade.   At this point time once was fully seated we then inserted our guidewire in the center center position on the AP and lateral fluoroscopic imaging. Once this was done we measured the depth of the guidewire to be 95 millimeters. We then drilled the lateral femoral cortex and inserted the size 95 millimeter helical blade over the guidewire. Once this was fully seated, we then tightened the set screw at the top of the nail and backed off one quarter turn. Based upon the fracture pattern we decided that we did not need to compress the fracture and this was done appropriately. We then went ahead and used our guide to insert our distal screw. We drilled bicortically and confirm this on her imaging and went ahead and inserted a size 34 millimeter distal locking screw. Once this is in position we confirmed on AP and lateral fluoroscopic planes that we had less than a 25 mm tip apex distance for the helical blade and good placement of the remainder of the hardware. The wounds were then irrigated with saline and we directed our attention towards the closure. CLOSURE:    The deep fascia was closed with #0 Vicryl in interrupted figure-of-eight fashion. The subcutaneous tissue was then closed with 2-0 Vicryl in an interrupted inverted fashion. The skin edges were then approximated using staples. A sterile dressing was placed over this. The patient was aroused in the operating room and taken to the recovery room in stable condition. Of note all needle and sponge counts were correct in the operating room. I was present and scrubbed for the entire procedure. PLAN:    Postoperatively the patient will be weightbearing as tolerated. The patient will be on extended DVT prophylaxis to manage the hip fracture.   We will continue to follow the patient closely while in the hospital.    Implants:      Implants: Short TFN 11mm x 130 deg      Ronan Ochoa MD  [phone] (c) [phone] (o)  6/21/2023

## 2023-06-22 LAB
ANION GAP SERPL CALC-SCNC: 5 MMOL/L (ref 0–18)
BUN BLD-MCNC: 16 MG/DL (ref 7–18)
BUN/CREAT SERPL: 20.8 (ref 10–20)
CALCIUM BLD-MCNC: 8.6 MG/DL (ref 8.5–10.1)
CHLORIDE SERPL-SCNC: 108 MMOL/L (ref 98–112)
CO2 SERPL-SCNC: 30 MMOL/L (ref 21–32)
CREAT BLD-MCNC: 0.77 MG/DL
DEPRECATED RDW RBC AUTO: 42.9 FL (ref 35.1–46.3)
ERYTHROCYTE [DISTWIDTH] IN BLOOD BY AUTOMATED COUNT: 12.6 % (ref 11–15)
GFR SERPLBLD BASED ON 1.73 SQ M-ARVRAT: 98 ML/MIN/1.73M2 (ref 60–?)
GLUCOSE BLD-MCNC: 100 MG/DL (ref 70–99)
HCT VFR BLD AUTO: 26.5 %
HGB BLD-MCNC: 8.8 G/DL
MCH RBC QN AUTO: 31 PG (ref 26–34)
MCHC RBC AUTO-ENTMCNC: 33.2 G/DL (ref 31–37)
MCV RBC AUTO: 93.3 FL
OSMOLALITY SERPL CALC.SUM OF ELEC: 297 MOSM/KG (ref 275–295)
PLATELET # BLD AUTO: 155 10(3)UL (ref 150–450)
POTASSIUM SERPL-SCNC: 3.6 MMOL/L (ref 3.5–5.1)
RBC # BLD AUTO: 2.84 X10(6)UL
SODIUM SERPL-SCNC: 143 MMOL/L (ref 136–145)
WBC # BLD AUTO: 7.3 X10(3) UL (ref 4–11)

## 2023-06-22 PROCEDURE — 97165 OT EVAL LOW COMPLEX 30 MIN: CPT

## 2023-06-22 PROCEDURE — 85027 COMPLETE CBC AUTOMATED: CPT | Performed by: INTERNAL MEDICINE

## 2023-06-22 PROCEDURE — 80048 BASIC METABOLIC PNL TOTAL CA: CPT | Performed by: INTERNAL MEDICINE

## 2023-06-22 PROCEDURE — 97110 THERAPEUTIC EXERCISES: CPT

## 2023-06-22 PROCEDURE — 97530 THERAPEUTIC ACTIVITIES: CPT

## 2023-06-22 PROCEDURE — 97161 PT EVAL LOW COMPLEX 20 MIN: CPT

## 2023-06-22 PROCEDURE — 97535 SELF CARE MNGMENT TRAINING: CPT

## 2023-06-22 RX ORDER — ACETAMINOPHEN 500 MG
1000 TABLET ORAL EVERY 8 HOURS PRN
Qty: 30 TABLET | Refills: 0 | Status: SHIPPED | OUTPATIENT
Start: 2023-06-22

## 2023-06-22 RX ORDER — ENOXAPARIN SODIUM 100 MG/ML
40 INJECTION SUBCUTANEOUS DAILY
Qty: 0.4 ML | Refills: 0 | Status: SHIPPED | OUTPATIENT
Start: 2023-06-23

## 2023-06-22 RX ORDER — BISACODYL 10 MG
10 SUPPOSITORY, RECTAL RECTAL
Qty: 10 SUPPOSITORY | Refills: 0 | Status: SHIPPED | OUTPATIENT
Start: 2023-06-22

## 2023-06-22 RX ORDER — SODIUM CHLORIDE, SODIUM LACTATE, POTASSIUM CHLORIDE, CALCIUM CHLORIDE 600; 310; 30; 20 MG/100ML; MG/100ML; MG/100ML; MG/100ML
INJECTION, SOLUTION INTRAVENOUS CONTINUOUS
Status: DISCONTINUED | OUTPATIENT
Start: 2023-06-22 | End: 2023-06-23

## 2023-06-22 RX ORDER — PSEUDOEPHEDRINE HCL 30 MG
100 TABLET ORAL 2 TIMES DAILY
Qty: 30 CAPSULE | Refills: 0 | Status: SHIPPED | OUTPATIENT
Start: 2023-06-22

## 2023-06-22 RX ORDER — HYDROCODONE BITARTRATE AND ACETAMINOPHEN 10; 325 MG/1; MG/1
1 TABLET ORAL EVERY 4 HOURS PRN
Qty: 60 TABLET | Refills: 0 | Status: SHIPPED | OUTPATIENT
Start: 2023-06-22

## 2023-06-22 NOTE — PLAN OF CARE
Problem: Patient Centered Care  Goal: Patient preferences are identified and integrated in the patient's plan of care  Description: Interventions:  - What would you like us to know as we care for you?  I FELL DOWN  - Provide timely, complete, and accurate information to patient/family  - Incorporate patient and family knowledge, values, beliefs, and cultural backgrounds into the planning and delivery of care  - Encourage patient/family to participate in care and decision-making at the level they choose  - Honor patient and family perspectives and choices  Outcome: Progressing     Problem: Patient/Family Goals  Goal: Patient/Family Long Term Goal  Description: Patient's Long Term Goal: FIX MY LEG    Interventions:  - SURGERY CONSULT  - PAIN MANAGEMENT    - See additional Care Plan goals for specific interventions  Outcome: Progressing  Goal: Patient/Family Short Term Goal  Description: Patient's Short Term Goal: NO PAIN IN LEG    Interventions:   - PAIN MANAGEMENT  - SURGICAL CONSULT  -PT/OT WHEN MEDICALLY CLEAR  - See additional Care Plan goals for specific interventions  Outcome: Progressing     Problem: SKIN/TISSUE INTEGRITY - ADULT  Goal: Skin integrity remains intact  Description: INTERVENTIONS  - Assess and document risk factors for pressure ulcer development  - Assess and document skin integrity  - Monitor for areas of redness and/or skin breakdown  - Initiate interventions, skin care algorithm/standards of care as needed  Outcome: Progressing  Goal: Incision(s), wounds(s) or drain site(s) healing without S/S of infection  Description: INTERVENTIONS:  - Assess and document risk factors for pressure ulcer development  - Assess and document skin integrity  - Assess and document dressing/incision, wound bed, drain sites and surrounding tissue  - Implement wound care per orders  - Initiate isolation precautions as appropriate  - Initiate Pressure Ulcer prevention bundle as indicated  Outcome: Progressing  Goal: Oral mucous membranes remain intact  Description: INTERVENTIONS  - Assess oral mucosa and hygiene practices  - Implement preventative oral hygiene regimen  - Implement oral medicated treatments as ordered  Outcome: Progressing     Problem: MUSCULOSKELETAL - ADULT  Goal: Return mobility to safest level of function  Description: INTERVENTIONS:  - Assess patient stability and activity tolerance for standing, transferring and ambulating w/ or w/o assistive devices  - Assist with transfers and ambulation using safe patient handling equipment as needed  - Ensure adequate protection for wounds/incisions during mobilization  - Obtain PT/OT consults as needed  - Advance activity as appropriate  - Communicate ordered activity level and limitations with patient/family  Outcome: Progressing  Goal: Maintain proper alignment of affected body part  Description: INTERVENTIONS:  - Support and protect limb and body alignment per provider's orders  - Instruct and reinforce with patient and family use of appropriate assistive device and precautions (e.g. spinal or hip dislocation precautions)  Outcome: Progressing     Problem: NEUROLOGICAL - ADULT  Goal: Achieves stable or improved neurological status  Description: INTERVENTIONS  - Assess for and report changes in neurological status  - Initiate measures to prevent increased intracranial pressure  - Maintain blood pressure and fluid volume within ordered parameters to optimize cerebral perfusion and minimize risk of hemorrhage  - Monitor temperature, glucose, and sodium.  Initiate appropriate interventions as ordered  Outcome: Progressing  Goal: Achieves maximal functionality and self care  Description: INTERVENTIONS  - Monitor swallowing and airway patency with patient fatigue and changes in neurological status  - Encourage and assist patient to increase activity and self care with guidance from PT/OT  - Encourage visually impaired, hearing impaired and aphasic patients to use assistive/communication devices  Outcome: Progressing     Problem: Impaired Activities of Daily Living  Goal: Achieve highest/safest level of independence in self care  Description: Interventions:  - Assess ability and encourage patient to participate in ADLs to maximize function  - Promote sitting position while performing ADLs such as feeding, grooming, and bathing  - Educate and encourage patient/family in tolerated functional activity level and precautions during self-care    Outcome: Progressing     Problem: PAIN - ADULT  Goal: Verbalizes/displays adequate comfort level or patient's stated pain goal  Description: INTERVENTIONS:  - Encourage pt to monitor pain and request assistance  - Assess pain using appropriate pain scale  - Administer analgesics based on type and severity of pain and evaluate response  - Implement non-pharmacological measures as appropriate and evaluate response  - Consider cultural and social influences on pain and pain management  - Manage/alleviate anxiety  - Utilize distraction and/or relaxation techniques  - Monitor for opioid side effects  - Notify MD/LIP if interventions unsuccessful or patient reports new pain  - Anticipate increased pain with activity and pre-medicate as appropriate  Outcome: Progressing     Problem: RISK FOR INFECTION - ADULT  Goal: Absence of fever/infection during anticipated neutropenic period  Description: INTERVENTIONS  - Monitor WBC  - Administer growth factors as ordered  - Implement neutropenic guidelines  Outcome: Progressing     Problem: SAFETY ADULT - FALL  Goal: Free from fall injury  Description: INTERVENTIONS:  - Assess pt frequently for physical needs  - Identify cognitive and physical deficits and behaviors that affect risk of falls.   - Stella fall precautions as indicated by assessment.  - Educate pt/family on patient safety including physical limitations  - Instruct pt to call for assistance with activity based on assessment  - Modify environment to reduce risk of injury  - Provide assistive devices as appropriate  - Consider OT/PT consult to assist with strengthening/mobility  - Encourage toileting schedule  Outcome: Progressing     Problem: DISCHARGE PLANNING  Goal: Discharge to home or other facility with appropriate resources  Description: INTERVENTIONS:  - Identify barriers to discharge w/pt and caregiver  - Include patient/family/discharge partner in discharge planning  - Arrange for needed discharge resources and transportation as appropriate  - Identify discharge learning needs (meds, wound care, etc)  - Arrange for interpreters to assist at discharge as needed  - Consider post-discharge preferences of patient/family/discharge partner  - Complete POLST form as appropriate  - Assess patient's ability to be responsible for managing their own health  - Refer to Case Management Department for coordinating discharge planning if the patient needs post-hospital services based on physician/LIP order or complex needs related to functional status, cognitive ability or social support system  Outcome: Progressing     Pt resting in bed. Norco prn for pain. Dressing to left hip c/d/I. Ice pack prn. Nuero checks per order. Guerrero in place, borderline output overnight-notified MD, continue IVF at 80ml/hr and keep guerrero in for morning. Forgetful, Pt needing frequent reorientation. Does not try to get out of bed. Tolerating diet. Tolerated bed bath. Safety measures in place. Frequent rounding being done.

## 2023-06-22 NOTE — PLAN OF CARE
From [facility]. Post op day #1. Guardian Will was notified he did get up and take a few weightbearing steps with PT to chair short distance and plan will be to return to [place] and medications for pain include Norco and Tylenol and he is eating. IV fluids maintained and guerrero patent with low urine output, MD aware. 1800-Output increased. Guerrero removed per MD order. Check void. Dressing mepilex aquacells x2  changed to the 3 stapled incisions. No drainage noted. Problem: Patient Centered Care  Goal: Patient preferences are identified and integrated in the patient's plan of care  Description: Interventions:  - What would you like us to know as we care for you?  I FELL DOWN  - Provide timely, complete, and accurate information to patient/family  - Incorporate patient and family knowledge, values, beliefs, and cultural backgrounds into the planning and delivery of care  - Encourage patient/family to participate in care and decision-making at the level they choose  - Honor patient and family perspectives and choices  Outcome: Progressing     Problem: Patient/Family Goals  Goal: Patient/Family Long Term Goal  Description: Patient's Long Term Goal: FIX MY LEG    Interventions:  - SURGERY CONSULT  - PAIN MANAGEMENT    - See additional Care Plan goals for specific interventions  Outcome: Progressing  Goal: Patient/Family Short Term Goal  Description: Patient's Short Term Goal: NO PAIN IN LEG    Interventions:   - PAIN MANAGEMENT  - SURGICAL CONSULT  -PT/OT WHEN MEDICALLY CLEAR  - See additional Care Plan goals for specific interventions  Outcome: Progressing     Problem: SKIN/TISSUE INTEGRITY - ADULT  Goal: Skin integrity remains intact  Description: INTERVENTIONS  - Assess and document risk factors for pressure ulcer development  - Assess and document skin integrity  - Monitor for areas of redness and/or skin breakdown  - Initiate interventions, skin care algorithm/standards of care as needed  Outcome: Progressing  Goal: Incision(s), wounds(s) or drain site(s) healing without S/S of infection  Description: INTERVENTIONS:  - Assess and document risk factors for pressure ulcer development  - Assess and document skin integrity  - Assess and document dressing/incision, wound bed, drain sites and surrounding tissue  - Implement wound care per orders  - Initiate isolation precautions as appropriate  - Initiate Pressure Ulcer prevention bundle as indicated  Outcome: Progressing  Goal: Oral mucous membranes remain intact  Description: INTERVENTIONS  - Assess oral mucosa and hygiene practices  - Implement preventative oral hygiene regimen  - Implement oral medicated treatments as ordered  Outcome: Progressing     Problem: MUSCULOSKELETAL - ADULT  Goal: Return mobility to safest level of function  Description: INTERVENTIONS:  - Assess patient stability and activity tolerance for standing, transferring and ambulating w/ or w/o assistive devices  - Assist with transfers and ambulation using safe patient handling equipment as needed  - Ensure adequate protection for wounds/incisions during mobilization  - Obtain PT/OT consults as needed  - Advance activity as appropriate  - Communicate ordered activity level and limitations with patient/family  Outcome: Progressing  Goal: Maintain proper alignment of affected body part  Description: INTERVENTIONS:  - Support and protect limb and body alignment per provider's orders  - Instruct and reinforce with patient and family use of appropriate assistive device and precautions (e.g. spinal or hip dislocation precautions)  Outcome: Progressing     Problem: NEUROLOGICAL - ADULT  Goal: Achieves stable or improved neurological status  Description: INTERVENTIONS  - Assess for and report changes in neurological status  - Initiate measures to prevent increased intracranial pressure  - Maintain blood pressure and fluid volume within ordered parameters to optimize cerebral perfusion and minimize risk of hemorrhage  - Monitor temperature, glucose, and sodium.  Initiate appropriate interventions as ordered  Outcome: Progressing  Goal: Achieves maximal functionality and self care  Description: INTERVENTIONS  - Monitor swallowing and airway patency with patient fatigue and changes in neurological status  - Encourage and assist patient to increase activity and self care with guidance from PT/OT  - Encourage visually impaired, hearing impaired and aphasic patients to use assistive/communication devices  Outcome: Progressing     Problem: Impaired Activities of Daily Living  Goal: Achieve highest/safest level of independence in self care  Description: Interventions:  - Assess ability and encourage patient to participate in ADLs to maximize function  - Promote sitting position while performing ADLs such as feeding, grooming, and bathing  - Educate and encourage patient/family in tolerated functional activity level and precautions during self-care  Outcome: Progressing     Problem: PAIN - ADULT  Goal: Verbalizes/displays adequate comfort level or patient's stated pain goal  Description: INTERVENTIONS:  - Encourage pt to monitor pain and request assistance  - Assess pain using appropriate pain scale  - Administer analgesics based on type and severity of pain and evaluate response  - Implement non-pharmacological measures as appropriate and evaluate response  - Consider cultural and social influences on pain and pain management  - Manage/alleviate anxiety  - Utilize distraction and/or relaxation techniques  - Monitor for opioid side effects  - Notify MD/LIP if interventions unsuccessful or patient reports new pain  - Anticipate increased pain with activity and pre-medicate as appropriate  Outcome: Progressing     Problem: RISK FOR INFECTION - ADULT  Goal: Absence of fever/infection during anticipated neutropenic period  Description: INTERVENTIONS  - Monitor WBC  - Administer growth factors as ordered  - Implement neutropenic guidelines  Outcome: Progressing     Problem: SAFETY ADULT - FALL  Goal: Free from fall injury  Description: INTERVENTIONS:  - Assess pt frequently for physical needs  - Identify cognitive and physical deficits and behaviors that affect risk of falls.   - [place] fall precautions as indicated by assessment.  - Educate pt/family on patient safety including physical limitations  - Instruct pt to call for assistance with activity based on assessment  - Modify environment to reduce risk of injury  - Provide assistive devices as appropriate  - Consider OT/PT consult to assist with strengthening/mobility  - Encourage toileting schedule  Outcome: Progressing     Problem: DISCHARGE PLANNING  Goal: Discharge to home or other facility with appropriate resources  Description: INTERVENTIONS:  - Identify barriers to discharge w/pt and caregiver  - Include patient/family/discharge partner in discharge planning  - Arrange for needed discharge resources and transportation as appropriate  - Identify discharge learning needs (meds, wound care, etc)  - Arrange for interpreters to assist at discharge as needed  - Consider post-discharge preferences of patient/family/discharge partner  - Complete POLST form as appropriate  - Assess patient's ability to be responsible for managing their own health  - Refer to Case Management Department for coordinating discharge planning if the patient needs post-hospital services based on physician/LIP order or complex needs related to functional status, cognitive ability or social support system  Outcome: Progressing

## 2023-06-23 VITALS
SYSTOLIC BLOOD PRESSURE: 146 MMHG | BODY MASS INDEX: 17.5 KG/M2 | RESPIRATION RATE: 20 BRPM | DIASTOLIC BLOOD PRESSURE: 91 MMHG | WEIGHT: 115.5 LBS | TEMPERATURE: 99 F | HEART RATE: 85 BPM | OXYGEN SATURATION: 98 % | HEIGHT: 68 IN

## 2023-06-23 LAB
ANION GAP SERPL CALC-SCNC: 3 MMOL/L (ref 0–18)
BUN BLD-MCNC: 17 MG/DL (ref 7–18)
BUN/CREAT SERPL: 26.6 (ref 10–20)
CALCIUM BLD-MCNC: 8.2 MG/DL (ref 8.5–10.1)
CHLORIDE SERPL-SCNC: 105 MMOL/L (ref 98–112)
CO2 SERPL-SCNC: 30 MMOL/L (ref 21–32)
CREAT BLD-MCNC: 0.64 MG/DL
DEPRECATED RDW RBC AUTO: 43.8 FL (ref 35.1–46.3)
ERYTHROCYTE [DISTWIDTH] IN BLOOD BY AUTOMATED COUNT: 12.7 % (ref 11–15)
GFR SERPLBLD BASED ON 1.73 SQ M-ARVRAT: 103 ML/MIN/1.73M2 (ref 60–?)
GLUCOSE BLD-MCNC: 102 MG/DL (ref 70–99)
HCT VFR BLD AUTO: 25.1 %
HGB BLD-MCNC: 8.3 G/DL
MCH RBC QN AUTO: 31 PG (ref 26–34)
MCHC RBC AUTO-ENTMCNC: 33.1 G/DL (ref 31–37)
MCV RBC AUTO: 93.7 FL
OSMOLALITY SERPL CALC.SUM OF ELEC: 288 MOSM/KG (ref 275–295)
PLATELET # BLD AUTO: 148 10(3)UL (ref 150–450)
POTASSIUM SERPL-SCNC: 4.1 MMOL/L (ref 3.5–5.1)
RBC # BLD AUTO: 2.68 X10(6)UL
SODIUM SERPL-SCNC: 138 MMOL/L (ref 136–145)
WBC # BLD AUTO: 7.4 X10(3) UL (ref 4–11)

## 2023-06-23 PROCEDURE — 85027 COMPLETE CBC AUTOMATED: CPT | Performed by: INTERNAL MEDICINE

## 2023-06-23 PROCEDURE — 80048 BASIC METABOLIC PNL TOTAL CA: CPT | Performed by: INTERNAL MEDICINE

## 2023-06-23 NOTE — CM/SW NOTE
06/23/23 0840   Discharge disposition   Expected discharge disposition [id number] Provider   ([address])   Discharge transportation [facility]   MDO for dc. Cm notified liaison of above. RN requested first available transport. State guardian Gerry Mtz @ [phone]   Notified. Plan  Adam of [address]  Amb/pcs done  RN report to [phone]    / to remain available for support and/or discharge planning.      Deondre Kasper, RN    Ext [phone]

## 2023-06-23 NOTE — PROGRESS NOTES
Orthopedics    Patient resting comfortably today in the bed. Vital signs are stable. Overall the patient is neurovascular tact motor and sensory function to his left lower extremity the best I can tell. Plan patient is postoperative day 2 status post open reduction and internal fixation of his left hip fracture. 1.  The patient is weightbearing as tolerated on his left lower extremity and can mobilize as  tolerated. 2.  The patient can be discharged to skilled nursing facility when cleared by physical therapy and medically cleared. 3.  The patient should follow-up in our office in approximately 2 weeks for a wound check upon discharge.     Trish Gan MD

## 2023-06-23 NOTE — PLAN OF CARE
Progress adequate for discharge to [facility] today. Scripts sent with packet. Guardian Will informed and updated/aware of discharge plan today and new scripts. Dry dressing aquacell changed, clean and dry. Will follow up in 2 weeks with ortho.

## 2023-07-16 NOTE — DISCHARGE SUMMARY
[facility]    Discharge Summary    Mina Matthew Patient Status:  Inpatient    7/10/1955 MRN [id number]   Location [facility] 4W/SW/SE Attending No att. providers found   Hosp Day # 3 PCP No primary care provider on file.     Date of Admission: 2023 Disposition: SNF Subacute Rehab     Date of Discharge: 2023 11:24 AM    Admitting Diagnosis: Closed fracture of left hip, initial encounter ([facility]) [S72.002A]    Discharge Diagnosis: Patient Active Problem List:     Closed fracture of left hip, initial encounter ([facility])      Reason for Admission: Acute left intertrochanteric fracture    Physical Exam:   General appearance: alert, cooperative and no distress  Pulmonary:  clear to auscultation bilaterally  Cardiovascular: S1, S2 normal  Abdominal: soft, non-tender; bowel sounds normal; no masses,  no organomegaly  Extremities:  Left hip surgical wound with dressing, bilateral distal lower extremity proximal presented    History of Present Illness:   Mechanical fall  Left hip pain  Acute left intertrochanteric fracture  -  Intramedullary nail was placed on 2023  -Weightbearing as tolerated per Ortho     Acute blood loss anemia secondary to surgery  - Hemodynamically stable  - Monitor     Low urine output  - Renal function is stable  - Continue to monitor     Hypertension  -  Blood pressure controlled     Seizure disorder  - Controlled  - Continue meds     Dyslipidemia  Previous history of cardiac arrest  Anoxic encephalopathy     DVT prophylaxis  - [facility] Course:   Mina Matthew is a(n) 67 year old male  with past medical history of dementia, dyslipidemia, hypertension, previous cardiac arrest, anoxic encephalopathy, seizure disorder, chronic ileus, and BPH presented to emergency room after a mechanical fall at nursing home.  No head injuries, loss of consciousness or seizure activities noted.  Imaging showed acute comminuted left femoral intertrochanteric  fracture.  CT scan of the head and neck was unremarkable.  Orthopedic was consulted.     Consultations: Ortho    Procedures: As above    Complications: None    Discharge Condition: Stable         Discharge Medications:      Discharge Medications        START taking these medications        Instructions Prescription details   acetaminophen 500 MG Tabs  Commonly known as: Tylenol Extra Strength      Take 2 tablets (1,000 mg total) by mouth every 8 (eight) hours as needed.   Quantity: 30 tablet  Refills: 0     bisacodyl 10 MG Supp  Commonly known as: Dulcolax      Place 1 suppository (10 mg total) rectally daily as needed.   Quantity: 10 suppository  Refills: 0     docusate sodium 100 MG Caps  Commonly known as: COLACE      Take 100 mg by mouth 2 (two) times daily.   Quantity: 30 capsule  Refills: 0     enoxaparin 40 MG/0.4ML Sosy  Commonly known as: Lovenox      Inject 0.4 mL (40 mg total) into the skin daily.   Quantity: 0.4 mL  Refills: 0     HYDROcodone-acetaminophen  MG Tabs  Commonly known as: Norco      Take 1 tablet by mouth every 4 (four) hours as needed.   Quantity: 60 tablet  Refills: 0            CONTINUE taking these medications        Instructions Prescription details   B Complex-C-Folic Acid Tabs      Take by mouth.   Refills: 0     carBAMazepine  MG Tb12  Commonly known as: TEGRETOL XR      Take 1 tablet (200 mg total) by mouth 2 (two) times daily.   Refills: 0     cholecalciferol 1000 UNITS Caps  Commonly known as: Vitamin D3      Take 1 capsule (1,000 Units total) by mouth daily.   Refills: 0     finasteride 5 MG Tabs  Commonly known as: Proscar      Take 1 tablet (5 mg total) by mouth daily.   Refills: 0     lisinopril 30 MG Tabs      Take 1 tablet (30 mg total) by mouth daily.   Refills: 0     polyethylene glycol (PEG 3350) 17 g Pack  Commonly known as: Miralax      Take 17 g by mouth daily as needed.   Refills: 0     sennosides 8.6 MG Tabs  Commonly known as: Senokot      Take 1 tablet  (8.6 mg total) by mouth 2 (two) times daily.   Refills: 0     sertraline 25 MG Tabs  Commonly known as: Zoloft      Take 0.5 tablets (12.5 mg total) by mouth daily.   Refills: 0     tamsulosin 0.4 MG Caps  Commonly known as: Flomax      Take 1 capsule (0.4 mg total) by mouth daily.   Refills: 0               Where to Get Your Medications        Please  your prescriptions at the location directed by your doctor or nurse    Bring a paper prescription for each of these medications  acetaminophen 500 MG Tabs  bisacodyl 10 MG Supp  docusate sodium 100 MG Caps  enoxaparin 40 MG/0.4ML Sosy  HYDROcodone-acetaminophen  MG Tabs           ZAK HERNANDEZ MD

## 2023-12-19 PROBLEM — I48.91 ATRIAL FIBRILLATION WITH RVR (HCC): Status: ACTIVE | Noted: 2023-12-19

## 2023-12-20 PROBLEM — I48.91 ATRIAL FIBRILLATION WITH RVR (HCC): Status: RESOLVED | Noted: 2023-12-19 | Resolved: 2023-12-20

## 2024-08-27 ENCOUNTER — APPOINTMENT (OUTPATIENT)
Dept: NUCLEAR MEDICINE | Age: 69
End: 2024-08-27
Payer: MEDICARE

## 2024-08-27 ENCOUNTER — HOSPITAL ENCOUNTER (INPATIENT)
Age: 69
LOS: 3 days | Discharge: HOME | End: 2024-08-30
Attending: EMERGENCY MEDICINE
Payer: MEDICARE

## 2024-08-27 ENCOUNTER — APPOINTMENT (OUTPATIENT)
Dept: GENERAL RADIOLOGY | Age: 69
End: 2024-08-27
Payer: MEDICARE

## 2024-08-27 DIAGNOSIS — R07.9 CHEST PAIN, UNSPECIFIED TYPE: ICD-10-CM

## 2024-08-27 DIAGNOSIS — I48.91 ATRIAL FIBRILLATION WITH RAPID VENTRICULAR RESPONSE (HCC): Primary | ICD-10-CM

## 2024-08-27 DIAGNOSIS — I48.19 PERSISTENT ATRIAL FIBRILLATION (HCC): ICD-10-CM

## 2024-08-27 LAB
ANION GAP SERPL CALCULATED.3IONS-SCNC: 10 MMOL/L (ref 9–16)
B PARAP IS1001 DNA NPH QL NAA+NON-PROBE: NOT DETECTED
B PERT DNA SPEC QL NAA+PROBE: NOT DETECTED
BASOPHILS # BLD: <0.03 K/UL (ref 0–0.2)
BASOPHILS NFR BLD: 0 % (ref 0–2)
BUN SERPL-MCNC: 14 MG/DL (ref 8–23)
C PNEUM DNA NPH QL NAA+NON-PROBE: NOT DETECTED
CALCIUM SERPL-MCNC: 9.3 MG/DL (ref 8.6–10.4)
CHLORIDE SERPL-SCNC: 108 MMOL/L (ref 98–107)
CO2 SERPL-SCNC: 23 MMOL/L (ref 20–31)
CREAT SERPL-MCNC: 1.3 MG/DL (ref 0.7–1.2)
EKG ATRIAL RATE: 234 BPM
EKG Q-T INTERVAL: 312 MS
EKG QRS DURATION: 92 MS
EKG QTC CALCULATION (BAZETT): 455 MS
EKG R AXIS: 23 DEGREES
EKG T AXIS: 42 DEGREES
EKG VENTRICULAR RATE: 128 BPM
EOSINOPHIL # BLD: 0.05 K/UL (ref 0–0.44)
EOSINOPHILS RELATIVE PERCENT: 0 % (ref 1–4)
ERYTHROCYTE [DISTWIDTH] IN BLOOD BY AUTOMATED COUNT: 15.7 % (ref 11.8–14.4)
FLUAV RNA NPH QL NAA+NON-PROBE: NOT DETECTED
FLUBV RNA NPH QL NAA+NON-PROBE: NOT DETECTED
GFR, ESTIMATED: 61 ML/MIN/1.73M2
GLUCOSE SERPL-MCNC: 212 MG/DL (ref 74–99)
HADV DNA NPH QL NAA+NON-PROBE: NOT DETECTED
HCOV 229E RNA NPH QL NAA+NON-PROBE: NOT DETECTED
HCOV HKU1 RNA NPH QL NAA+NON-PROBE: NOT DETECTED
HCOV NL63 RNA NPH QL NAA+NON-PROBE: NOT DETECTED
HCOV OC43 RNA NPH QL NAA+NON-PROBE: NOT DETECTED
HCT VFR BLD AUTO: 49.4 % (ref 40.7–50.3)
HGB BLD-MCNC: 15.9 G/DL (ref 13–17)
HMPV RNA NPH QL NAA+NON-PROBE: NOT DETECTED
HPIV1 RNA NPH QL NAA+NON-PROBE: NOT DETECTED
HPIV2 RNA NPH QL NAA+NON-PROBE: NOT DETECTED
HPIV3 RNA NPH QL NAA+NON-PROBE: NOT DETECTED
HPIV4 RNA NPH QL NAA+NON-PROBE: NOT DETECTED
IMM GRANULOCYTES # BLD AUTO: 0.07 K/UL (ref 0–0.3)
IMM GRANULOCYTES NFR BLD: 1 %
INR PPP: 2.5
LYMPHOCYTES NFR BLD: 1.07 K/UL (ref 1.1–3.7)
LYMPHOCYTES RELATIVE PERCENT: 9 % (ref 24–43)
M PNEUMO DNA NPH QL NAA+NON-PROBE: NOT DETECTED
MCH RBC QN AUTO: 27.9 PG (ref 25.2–33.5)
MCHC RBC AUTO-ENTMCNC: 32.2 G/DL (ref 28.4–34.8)
MCV RBC AUTO: 86.8 FL (ref 82.6–102.9)
MONOCYTES NFR BLD: 0.73 K/UL (ref 0.1–1.2)
MONOCYTES NFR BLD: 6 % (ref 3–12)
MYOGLOBIN SERPL-MCNC: 28 NG/ML (ref 28–72)
MYOGLOBIN SERPL-MCNC: 30 NG/ML (ref 28–72)
NEUTROPHILS NFR BLD: 84 % (ref 36–65)
NEUTS SEG NFR BLD: 10.16 K/UL (ref 1.5–8.1)
NRBC BLD-RTO: 0 PER 100 WBC
PLATELET # BLD AUTO: ABNORMAL K/UL (ref 138–453)
PLATELET, FLUORESCENCE: 143 K/UL (ref 138–453)
PLATELETS.RETICULATED NFR BLD AUTO: 4.2 % (ref 1.1–10.3)
POTASSIUM SERPL-SCNC: 3.9 MMOL/L (ref 3.7–5.3)
PROTHROMBIN TIME: 26.7 SEC (ref 11.7–14.9)
RBC # BLD AUTO: 5.69 M/UL (ref 4.21–5.77)
RBC # BLD: ABNORMAL 10*6/UL
RSV RNA NPH QL NAA+NON-PROBE: NOT DETECTED
RV+EV RNA NPH QL NAA+NON-PROBE: NOT DETECTED
SARS-COV-2 RNA NPH QL NAA+NON-PROBE: NOT DETECTED
SODIUM SERPL-SCNC: 141 MMOL/L (ref 136–145)
SPECIMEN DESCRIPTION: NORMAL
TROPONIN I SERPL HS-MCNC: 11 NG/L (ref 0–22)
TROPONIN I SERPL HS-MCNC: 14 NG/L (ref 0–22)
TROPONIN I SERPL HS-MCNC: 21 NG/L (ref 0–22)
TROPONIN I SERPL HS-MCNC: 26 NG/L (ref 0–22)
TSH SERPL DL<=0.05 MIU/L-ACNC: 0.35 UIU/ML (ref 0.27–4.2)
WBC OTHER # BLD: 12.1 K/UL (ref 3.5–11.3)

## 2024-08-27 PROCEDURE — 83874 ASSAY OF MYOGLOBIN: CPT

## 2024-08-27 PROCEDURE — 6370000000 HC RX 637 (ALT 250 FOR IP): Performed by: NURSE PRACTITIONER

## 2024-08-27 PROCEDURE — 84484 ASSAY OF TROPONIN QUANT: CPT

## 2024-08-27 PROCEDURE — 6360000002 HC RX W HCPCS: Performed by: STUDENT IN AN ORGANIZED HEALTH CARE EDUCATION/TRAINING PROGRAM

## 2024-08-27 PROCEDURE — 2500000003 HC RX 250 WO HCPCS

## 2024-08-27 PROCEDURE — 85055 RETICULATED PLATELET ASSAY: CPT

## 2024-08-27 PROCEDURE — 2580000003 HC RX 258: Performed by: NURSE PRACTITIONER

## 2024-08-27 PROCEDURE — 99223 1ST HOSP IP/OBS HIGH 75: CPT | Performed by: INTERNAL MEDICINE

## 2024-08-27 PROCEDURE — 80048 BASIC METABOLIC PNL TOTAL CA: CPT

## 2024-08-27 PROCEDURE — 71045 X-RAY EXAM CHEST 1 VIEW: CPT

## 2024-08-27 PROCEDURE — 96365 THER/PROPH/DIAG IV INF INIT: CPT

## 2024-08-27 PROCEDURE — 84443 ASSAY THYROID STIM HORMONE: CPT

## 2024-08-27 PROCEDURE — 6370000000 HC RX 637 (ALT 250 FOR IP): Performed by: STUDENT IN AN ORGANIZED HEALTH CARE EDUCATION/TRAINING PROGRAM

## 2024-08-27 PROCEDURE — 2580000003 HC RX 258

## 2024-08-27 PROCEDURE — 93005 ELECTROCARDIOGRAM TRACING: CPT

## 2024-08-27 PROCEDURE — 96366 THER/PROPH/DIAG IV INF ADDON: CPT

## 2024-08-27 PROCEDURE — 6370000000 HC RX 637 (ALT 250 FOR IP): Performed by: INTERNAL MEDICINE

## 2024-08-27 PROCEDURE — 0202U NFCT DS 22 TRGT SARS-COV-2: CPT

## 2024-08-27 PROCEDURE — 96375 TX/PRO/DX INJ NEW DRUG ADDON: CPT

## 2024-08-27 PROCEDURE — 85025 COMPLETE CBC W/AUTO DIFF WBC: CPT

## 2024-08-27 PROCEDURE — 2580000003 HC RX 258: Performed by: STUDENT IN AN ORGANIZED HEALTH CARE EDUCATION/TRAINING PROGRAM

## 2024-08-27 PROCEDURE — 85610 PROTHROMBIN TIME: CPT

## 2024-08-27 PROCEDURE — 99285 EMERGENCY DEPT VISIT HI MDM: CPT

## 2024-08-27 PROCEDURE — 2060000000 HC ICU INTERMEDIATE R&B

## 2024-08-27 PROCEDURE — 99223 1ST HOSP IP/OBS HIGH 75: CPT | Performed by: STUDENT IN AN ORGANIZED HEALTH CARE EDUCATION/TRAINING PROGRAM

## 2024-08-27 PROCEDURE — 6360000002 HC RX W HCPCS

## 2024-08-27 RX ORDER — NITROGLYCERIN 0.4 MG/1
0.4 TABLET SUBLINGUAL EVERY 5 MIN PRN
Status: CANCELLED | OUTPATIENT
Start: 2024-08-27 | End: 2024-08-27

## 2024-08-27 RX ORDER — ONDANSETRON 2 MG/ML
4 INJECTION INTRAMUSCULAR; INTRAVENOUS EVERY 6 HOURS PRN
Status: DISCONTINUED | OUTPATIENT
Start: 2024-08-27 | End: 2024-08-30 | Stop reason: HOSPADM

## 2024-08-27 RX ORDER — WARFARIN SODIUM 5 MG/1
5 TABLET ORAL
Status: COMPLETED | OUTPATIENT
Start: 2024-08-27 | End: 2024-08-27

## 2024-08-27 RX ORDER — DILTIAZEM HCL 60 MG
120 TABLET ORAL EVERY 8 HOURS SCHEDULED
Status: DISCONTINUED | OUTPATIENT
Start: 2024-08-27 | End: 2024-08-30 | Stop reason: HOSPADM

## 2024-08-27 RX ORDER — ONDANSETRON 4 MG/1
4 TABLET, ORALLY DISINTEGRATING ORAL EVERY 8 HOURS PRN
Status: DISCONTINUED | OUTPATIENT
Start: 2024-08-27 | End: 2024-08-30 | Stop reason: HOSPADM

## 2024-08-27 RX ORDER — SODIUM CHLORIDE 9 MG/ML
INJECTION, SOLUTION INTRAVENOUS PRN
Status: DISCONTINUED | OUTPATIENT
Start: 2024-08-27 | End: 2024-08-30 | Stop reason: HOSPADM

## 2024-08-27 RX ORDER — REGADENOSON 0.08 MG/ML
0.4 INJECTION, SOLUTION INTRAVENOUS
Status: CANCELLED | OUTPATIENT
Start: 2024-08-27

## 2024-08-27 RX ORDER — DILTIAZEM HYDROCHLORIDE 5 MG/ML
10 INJECTION INTRAVENOUS ONCE
Status: CANCELLED | OUTPATIENT
Start: 2024-08-27 | End: 2024-08-27

## 2024-08-27 RX ORDER — AMINOPHYLLINE 25 MG/ML
50 INJECTION, SOLUTION INTRAVENOUS PRN
Status: CANCELLED | OUTPATIENT
Start: 2024-08-27 | End: 2024-08-27

## 2024-08-27 RX ORDER — METOPROLOL TARTRATE 1 MG/ML
5 INJECTION, SOLUTION INTRAVENOUS EVERY 5 MIN PRN
Status: CANCELLED | OUTPATIENT
Start: 2024-08-27 | End: 2024-08-27

## 2024-08-27 RX ORDER — SODIUM CHLORIDE 0.9 % (FLUSH) 0.9 %
10 SYRINGE (ML) INJECTION PRN
Status: DISCONTINUED | OUTPATIENT
Start: 2024-08-27 | End: 2024-08-30 | Stop reason: HOSPADM

## 2024-08-27 RX ORDER — DILTIAZEM HYDROCHLORIDE 5 MG/ML
25 INJECTION INTRAVENOUS ONCE
Status: COMPLETED | OUTPATIENT
Start: 2024-08-27 | End: 2024-08-27

## 2024-08-27 RX ORDER — SODIUM CHLORIDE 0.9 % (FLUSH) 0.9 %
5-40 SYRINGE (ML) INJECTION PRN
Status: CANCELLED | OUTPATIENT
Start: 2024-08-27 | End: 2024-08-27

## 2024-08-27 RX ORDER — METOPROLOL TARTRATE 1 MG/ML
5 INJECTION, SOLUTION INTRAVENOUS EVERY 4 HOURS PRN
Status: DISCONTINUED | OUTPATIENT
Start: 2024-08-27 | End: 2024-08-30 | Stop reason: HOSPADM

## 2024-08-27 RX ORDER — FENTANYL CITRATE 50 UG/ML
100 INJECTION, SOLUTION INTRAMUSCULAR; INTRAVENOUS ONCE
Status: COMPLETED | OUTPATIENT
Start: 2024-08-27 | End: 2024-08-27

## 2024-08-27 RX ORDER — SODIUM CHLORIDE 0.9 % (FLUSH) 0.9 %
5-40 SYRINGE (ML) INJECTION EVERY 12 HOURS SCHEDULED
Status: DISCONTINUED | OUTPATIENT
Start: 2024-08-27 | End: 2024-08-30 | Stop reason: HOSPADM

## 2024-08-27 RX ORDER — PANTOPRAZOLE SODIUM 40 MG/1
40 TABLET, DELAYED RELEASE ORAL DAILY
Status: DISCONTINUED | OUTPATIENT
Start: 2024-08-27 | End: 2024-08-30 | Stop reason: HOSPADM

## 2024-08-27 RX ORDER — ATORVASTATIN CALCIUM 20 MG/1
20 TABLET, FILM COATED ORAL NIGHTLY
Status: DISCONTINUED | OUTPATIENT
Start: 2024-08-27 | End: 2024-08-30 | Stop reason: HOSPADM

## 2024-08-27 RX ORDER — METOPROLOL SUCCINATE 25 MG/1
50 TABLET, EXTENDED RELEASE ORAL DAILY
Status: DISCONTINUED | OUTPATIENT
Start: 2024-08-27 | End: 2024-08-28

## 2024-08-27 RX ORDER — ATROPINE SULFATE 0.1 MG/ML
0.5 INJECTION INTRAVENOUS EVERY 5 MIN PRN
Status: CANCELLED | OUTPATIENT
Start: 2024-08-27 | End: 2024-08-27

## 2024-08-27 RX ORDER — ALBUTEROL SULFATE 90 UG/1
2 AEROSOL, METERED RESPIRATORY (INHALATION) PRN
Status: CANCELLED | OUTPATIENT
Start: 2024-08-27 | End: 2024-08-27

## 2024-08-27 RX ORDER — POLYETHYLENE GLYCOL 3350 17 G/17G
17 POWDER, FOR SOLUTION ORAL DAILY PRN
Status: DISCONTINUED | OUTPATIENT
Start: 2024-08-27 | End: 2024-08-30 | Stop reason: HOSPADM

## 2024-08-27 RX ORDER — SODIUM CHLORIDE 9 MG/ML
500 INJECTION, SOLUTION INTRAVENOUS CONTINUOUS PRN
Status: CANCELLED | OUTPATIENT
Start: 2024-08-27 | End: 2024-08-27

## 2024-08-27 RX ORDER — ACETAMINOPHEN 650 MG/1
650 SUPPOSITORY RECTAL EVERY 6 HOURS PRN
Status: DISCONTINUED | OUTPATIENT
Start: 2024-08-27 | End: 2024-08-30 | Stop reason: HOSPADM

## 2024-08-27 RX ORDER — WARFARIN SODIUM 5 MG/1
5 TABLET ORAL NIGHTLY
Status: DISCONTINUED | OUTPATIENT
Start: 2024-08-27 | End: 2024-08-27

## 2024-08-27 RX ORDER — ACETAMINOPHEN 325 MG/1
650 TABLET ORAL EVERY 6 HOURS PRN
Status: DISCONTINUED | OUTPATIENT
Start: 2024-08-27 | End: 2024-08-30 | Stop reason: HOSPADM

## 2024-08-27 RX ADMIN — METOPROLOL TARTRATE 5 MG: 5 INJECTION INTRAVENOUS at 20:43

## 2024-08-27 RX ADMIN — DILTIAZEM HYDROCHLORIDE 120 MG: 60 TABLET, FILM COATED ORAL at 21:15

## 2024-08-27 RX ADMIN — DILTIAZEM HYDROCHLORIDE 25 MG: 5 INJECTION, SOLUTION INTRAVENOUS at 02:33

## 2024-08-27 RX ADMIN — WARFARIN SODIUM 5 MG: 5 TABLET ORAL at 16:32

## 2024-08-27 RX ADMIN — DILTIAZEM HYDROCHLORIDE 5 MG/HR: 5 INJECTION, SOLUTION INTRAVENOUS at 02:38

## 2024-08-27 RX ADMIN — DILTIAZEM HYDROCHLORIDE 120 MG: 60 TABLET, FILM COATED ORAL at 11:19

## 2024-08-27 RX ADMIN — ATORVASTATIN CALCIUM 20 MG: 20 TABLET, FILM COATED ORAL at 21:16

## 2024-08-27 RX ADMIN — SODIUM CHLORIDE, PRESERVATIVE FREE 10 ML: 5 INJECTION INTRAVENOUS at 08:49

## 2024-08-27 RX ADMIN — METOPROLOL SUCCINATE 50 MG: 25 TABLET, EXTENDED RELEASE ORAL at 08:49

## 2024-08-27 RX ADMIN — FENTANYL CITRATE 100 MCG: 50 INJECTION, SOLUTION INTRAMUSCULAR; INTRAVENOUS at 02:59

## 2024-08-27 RX ADMIN — AZITHROMYCIN DIHYDRATE 500 MG: 500 INJECTION, POWDER, LYOPHILIZED, FOR SOLUTION INTRAVENOUS at 08:40

## 2024-08-27 RX ADMIN — SODIUM CHLORIDE, PRESERVATIVE FREE 5 ML: 5 INJECTION INTRAVENOUS at 22:00

## 2024-08-27 RX ADMIN — CEFTRIAXONE SODIUM 1000 MG: 1 INJECTION, POWDER, FOR SOLUTION INTRAMUSCULAR; INTRAVENOUS at 08:00

## 2024-08-27 RX ADMIN — PANTOPRAZOLE SODIUM 40 MG: 40 TABLET, DELAYED RELEASE ORAL at 08:49

## 2024-08-27 ASSESSMENT — PAIN DESCRIPTION - LOCATION: LOCATION: CHEST

## 2024-08-27 ASSESSMENT — PAIN SCALES - GENERAL
PAINLEVEL_OUTOF10: 2
PAINLEVEL_OUTOF10: 5

## 2024-08-27 ASSESSMENT — PAIN DESCRIPTION - FREQUENCY: FREQUENCY: CONTINUOUS

## 2024-08-27 ASSESSMENT — PAIN - FUNCTIONAL ASSESSMENT: PAIN_FUNCTIONAL_ASSESSMENT: 0-10

## 2024-08-27 ASSESSMENT — PAIN DESCRIPTION - DESCRIPTORS: DESCRIPTORS: DISCOMFORT;SHARP

## 2024-08-27 NOTE — PROGRESS NOTES
ED to inpatient nurses report      Chief Complaint:  Chief Complaint   Patient presents with    Irregular Heart Beat     States that he has a-fib and was having chest pain earlier      Present to ED from: Home    MOA:     LOC: alert and orientated to name, place, date  Mobility: Independent  Oxygen Baseline: RA    Current needs required: None   Pending ED orders: No  Present condition: Stable    Why did the patient come to the ED? CP -> A.Fib RVR  What is the plan? Stress test 8/28  Any procedures or intervention occur? Cardizem gtt switched to PO  Any safety concerns? N/A    Mental Status:  Level of Consciousness: Alert (0)    Psych Assessment:   Psychosocial  Psychosocial (WDL): Within Defined Limits  Vital signs   Vitals:    08/27/24 1130 08/27/24 1145 08/27/24 1200 08/27/24 1215   BP: 108/81 103/77 107/76 108/65   Pulse: 88 94 86 96   Resp: 21 19 17 18   Temp:   98.1 °F (36.7 °C)    TempSrc:   Oral    SpO2:   96%    Weight:            Vitals:  Patient Vitals for the past 24 hrs:   BP Temp Temp src Pulse Resp SpO2 Weight   08/27/24 1215 108/65 -- -- 96 18 -- --   08/27/24 1200 107/76 98.1 °F (36.7 °C) Oral 86 17 96 % --   08/27/24 1145 103/77 -- -- 94 19 -- --   08/27/24 1130 108/81 -- -- 88 21 -- --   08/27/24 1115 119/81 -- -- 88 19 -- --   08/27/24 1100 115/71 -- -- 90 21 -- --   08/27/24 1045 119/78 -- -- 97 22 -- --   08/27/24 1030 113/86 -- -- 91 23 -- --   08/27/24 1015 105/70 -- -- 95 22 -- --   08/27/24 1000 107/78 -- -- 88 21 -- --   08/27/24 0945 111/75 -- -- 92 24 -- --   08/27/24 0930 117/86 -- -- (!) 102 11 -- --   08/27/24 0915 107/81 -- -- (!) 115 22 -- --   08/27/24 0900 113/78 -- -- (!) 108 23 -- --   08/27/24 0845 118/74 -- -- 91 19 -- --   08/27/24 0830 106/69 -- -- (!) 105 16 -- --   08/27/24 0815 105/66 -- -- 99 21 -- --   08/27/24 0800 (!) 119/58 98 °F (36.7 °C) Oral (!) 105 22 97 % --   08/27/24 0745 109/81 -- -- 85 22 97 % --   08/27/24 0730 101/66 -- -- 99 20 95 % --   08/27/24 0715

## 2024-08-27 NOTE — ED PROVIDER NOTES
[facility] ED  Emergency Department Encounter  Emergency Medicine Resident     Pt Name:Nicholas Izaguirre  MRN: [id number]  Birthdate 1955  Date of evaluation: 8/27/24  PCP:  Rodrigo Saeed MD  Note Started: 2:18 AM EDT      CHIEF COMPLAINT       Chief Complaint   Patient presents with    Irregular Heart Beat     States that he has a-fib and was having chest pain earlier        HISTORY OF PRESENT ILLNESS  (Location/Symptom, Timing/Onset, Context/Setting, Quality, Duration, Modifying Factors, Severity.)      Nicholas Izaguirre is a 69 y.o. male who presents with palpitations.  Patient states that he has a history of A-fib and felt his A-fib \"acting up\" earlier tonight.  States that is also associated with some chest pressure radiating down his left arm.  Denies any radiation of the pain to his back or his abdomen.  Denies any shortness of breath, nausea, vomiting or any other complaints.  Patient does take warfarin for history of A-fib.    PAST MEDICAL / SURGICAL / SOCIAL / FAMILY HISTORY      has a past medical history of Adiposity, Atrial fibrillation and flutter (HCC), Atrial fibrillation with RVR (HCC), Cancer (HCC), Chronic kidney disease, Erectile dysfunction following radical prostatectomy, Fatigue, Fear of flying, GERD (gastroesophageal reflux disease), Hyperlipidemia, Hypertension, Osteoarthritis of knee, Overactive bladder, Palpitations, Seasonal allergic rhinitis due to pollen, and Sleep apnea.       has a past surgical history that includes Kidney stone surgery (y-4); hernia repair; joint replacement; Cardioversion; Cardiac surgery; and ablation of dysrhythmic focus (8/13/15).    Social History     Socioeconomic History    Marital status:      Spouse name: Not on file    Number of children: Not on file    Years of education: Not on file    Highest education level: Not on file   Occupational History    Not on file   Tobacco Use    Smoking status: Former    Smokeless  tobacco: Never   Substance and Sexual Activity    Alcohol use: No    Drug use: No    Sexual activity: Yes     Partners: Female   Other Topics Concern    Not on file   Social History Narrative    Not on file     Social Determinants of Health     Financial Resource Strain: Low Risk  (10/11/2022)    Received from [email or web address] [facility]    Overall Financial Resource Strain (CARDIA)     Difficulty of Paying Living Expenses: Not very hard   Food Insecurity: No Food Insecurity (8/22/2024)    Received from [employer]    Hunger Screening     Within the past 12 months we worried whether our food would run out before we got money to buy more.: Never True     Within the past 12 months the food we bought just didn't last and we didn't have money to get more.: Never True   Transportation Needs: No Transportation Needs (10/11/2022)    [facility] [employer] [facility]    PRAPARE - Transportation     Lack of Transportation (Medical): No     Lack of Transportation (Non-Medical): No   Physical Activity: Insufficiently Active (10/11/2022)    [facility] [employer], [facility]    Exercise Vital Sign     Days of Exercise per Week: 1 day     Minutes of Exercise per Session: 20 min   Stress: No Stress Concern Present (10/11/2022)    Received from [employer], [facility]    [place] [employer] - Occupational Stress Questionnaire     Feeling of Stress : Only a little   Social Connections: Socially Integrated (10/11/2022)    Received from [employer], [facility]    Social Connection and Isolation Panel [NHANES]     Frequency of Communication with Friends and Family: Three times a week     Frequency of Social Gatherings with Friends and Family: Once a week     Attends Taoism Services: 1 to 4 times per year     Active Member of Clubs or Organizations: Yes     Attends Club or  sounds: Normal heart sounds.   Pulmonary:      Effort: Pulmonary effort is normal.      Breath sounds: Normal breath sounds.   Abdominal:      General: Abdomen is flat.      Palpations: Abdomen is soft.      Tenderness: There is no abdominal tenderness.   Skin:     General: Skin is warm and dry.      Capillary Refill: Capillary refill takes less than 2 seconds.   Neurological:      General: No focal deficit present.      Mental Status: He is alert.           DDX/DIAGNOSTIC RESULTS / EMERGENCY DEPARTMENT COURSE / MDM     Medical Decision Making  Nicholas Izaguirre is a 69 y.o. male who presents with palpitations.  Patient is GCS 15, nontoxic appearing, not in acute distress, speaking full sentences, able to ambulate under their own power.  EKG shows patient is in A-fib with rapid ventricular response, complaining of chest pain.  Obtain cardiac workup, PT/INR, TSH, chest x-ray, plan for admission, treat with diltiazem bolus and infusion.      Amount and/or Complexity of Data Reviewed  Labs: ordered.  Radiology: ordered.  ECG/medicine tests: ordered.    Risk  Prescription drug management.  Decision regarding hospitalization.        EKG  Atrial fibrillation, ventricular rate of 128, normal axis, no ST ovation depressions, no T wave versions, A-fib with rapid ventricular response    All EKG's are interpreted by the Emergency Department Physician who either signs or Co-signs this chart in the absence of a cardiologist.    EMERGENCY DEPARTMENT COURSE:           PROCEDURES:      CONSULTS:  IP CONSULT TO HOSPITALIST  IP CONSULT TO CARDIOLOGY    CRITICAL CARE:  There was significant risk of life threatening deterioration of patient's condition requiring my direct management. Critical care time minutes, excluding any documented procedures.    FINAL IMPRESSION      1. Atrial fibrillation with rapid ventricular response (HCC)          DISPOSITION / PLAN     DISPOSITION Admitted 08/27/2024 05:36:14 AM  Condition at Disposition:  Fair      PATIENT REFERRED TO:  No follow-up provider specified.    DISCHARGE MEDICATIONS:  New Prescriptions    No medications on file       Terry Carter MD  Emergency Medicine Resident    (Please note that portions of this note were completed with a voice recognition program.  Efforts were made to edit the dictations but occasionally words are mis-transcribed.)

## 2024-08-27 NOTE — CARE COORDINATION
Case Management Assessment  Initial Evaluation    Date/Time of Evaluation: 8/27/2024 12:15 PM  Assessment Completed by: STONEY RODAS RN    If patient is discharged prior to next notation, then this note serves as note for discharge by case management.    Patient Name: Nicholas Izaguirre                   YOB: 1955  Diagnosis: Atrial fibrillation with RVR (HCC) [I48.91]                   Date / Time: 8/27/2024  2:04 AM    Patient Admission Status: Inpatient   Readmission Risk (Low < 19, Mod (19-27), High > 27): Readmission Risk Score: 11.6    Current PCP: Rodrigo Saeed MD  PCP verified by CM? Yes    Chart Reviewed: Yes      History Provided by: Patient  Patient Orientation: Alert and Oriented    Patient Cognition: Alert    Hospitalization in the last 30 days (Readmission):  No    If yes, Readmission Assessment in CM Navigator will be completed.    Advance Directives:      Code Status: Full Code   Patient's Primary Decision Maker is:        Discharge Planning:    Patient lives with: Spouse/Significant Other Type of Home: House  Primary Care Giver: Self  Patient Support Systems include: Spouse/Significant Other   Current Financial resources:    Current community resources:    Current services prior to admission: None            Current DME:              Type of Home Care services:  None    ADLS  Prior functional level: Independent in ADLs/IADLs  Current functional level: Independent in ADLs/IADLs    PT AM-PAC:   /24  OT AM-PAC:   /24    Family can provide assistance at DC: Yes  Would you like Case Management to discuss the discharge plan with any other family members/significant others, and if so, who? No  Plans to Return to Present Housing: Yes  Other Identified Issues/Barriers to RETURNING to current housing: none  Potential Assistance needed at discharge: N/A            Potential DME:    Patient expects to discharge to:    Plan for transportation at discharge:      Financial    Payor: MEDICARE /  Plan: MEDICARE PART A AND B / Product Type: *No Product type* /     Does insurance require precert for SNF: No    Potential assistance Purchasing Medications: No  Meds-to-Beds request:        [facility] - [place], [place] - [address] P [phone] - F [phone]  [address] [place] [address]  Phone: [phone] Fax: [phone]    [facility] (), [place] - [address] -  [phone] - F [phone]  [address] () [place] [address]  Phone: [phone] Fax: [phone]      Notes:    Factors facilitating achievement of predicted outcomes: Family support    Barriers to discharge: Medical complications    Additional Case Management Notes: home with wife    The Plan for Transition of Care is related to the following treatment goals of Atrial fibrillation with RVR (HCC) [I48.91]    IF APPLICABLE: The Patient and/or patient representative Nicholas and his family were provided with a choice of provider and agrees with the discharge plan. Freedom of choice list with basic dialogue that supports the patient's individualized plan of care/goals and shares the quality data associated with the providers was provided to:     Patient Representative Name:       The Patient and/or Patient Representative Agree with the Discharge Plan?      STONEY RODAS RN  Case Management Department  Ph: [phone] Fax:

## 2024-08-27 NOTE — ED NOTES
Patient presents to ED via triage with complaints of chest pain that started one hour PTA. Patient has hx of afib and takes warfarin. Patient states the pain is in the center of the chest and denies radiating pain. Patient denies N/V, excess sweating, SOB, or abdominal pain. Patient is A&O x4 and connected to full cardiac monitor. IV's placed, EKG done, call light within reach.

## 2024-08-27 NOTE — H&P
[facility]  Office: [phone]  Jessee Lan DO, Anish Jack DO, Ángel Colbert DO, Laurent Root DO, Mile Gibbons MD, Angle Burciaga MD, Bello Belle MD, Catherine Woods MD,  Bryan Adamson MD, Dennis Avila MD, Malcolm Flores DO, Jimmie Temple MD,  Cesar Perry DO, Divya Woodruff MD, Reyes Roldan MD, Romaine Lan DO, Brenda Beverly MD, Reid Rubio MD, Juno Pearce DO, Irene Parisi MD, Essie Milligan MD, Sridevi Woodall MD, Awa Rogers MD,  Vijay Quesada DO, Kristofer Costa MD,  Shirley Waterhouse, CNP,  Kimebrley Negron, CNP, Mell Dutta, CNP, Mohit Alexandra, CNP,  Berta Quiroz, [facility], Zully Winn, CNP, Qing Duong, CNP, Joanne Mckeon, CNP, Tammy Galvan, CNP, Elizabeth Allred, CNP, SANTANA Abdi-C, Sharmila Carmona, CNS, Carmen Brito, CNP, Kristyn Villatoro, CNP         [facility]   IN-PATIENT SERVICE   [facility]    HISTORY AND PHYSICAL EXAMINATION            Date:   8/27/2024  Patient name:  Nicholas Izaguirre  Date of admission:  8/27/2024  2:04 AM  MRN:   [id number]  Account:  [id number]  YOB: 1955  PCP:    Rodrigo Saeed MD  Room:   11/11  Code Status:    Prior    Chief Complaint:     Chief Complaint   Patient presents with    Irregular Heart Beat     States that he has a-fib and was having chest pain earlier        History Obtained From:     patient    History of Present Illness:     Nichloas Izaguirre is a 69 y.o. Non- / non  male who presents with Irregular Heart Beat (States that he has a-fib and was having chest pain earlier )   and is admitted to the hospital for the management of Atrial fibrillation with RVR (HCC).    69-year-old male with past medical history of A-fib on warfarin, CKD, hypertension, hyperlipidemia, sleep apnea on CPAP presented to ED due to complaints of chest pain.  Chest pain started around 1 AM while the patient was sitting watching TV.  Patient mentioned that he went to the    Procedure Laterality Date    ABLATION OF DYSRHYTHMIC FOCUS  8/13/15    CARDIAC SURGERY      ablation 2015    CARDIOVERSION       and 7-15    HERNIA REPAIR      JOINT REPLACEMENT      Bilateral knees    KIDNEY STONE SURGERY  y-4        Medications Prior to Admission:     Prior to Admission medications    Medication Sig Start Date End Date Taking? Authorizing Provider   metoprolol succinate (TOPROL XL) 50 MG extended release tablet Take 1 tablet by mouth daily 22   Michelle Resendiz MD   warfarin (COUMADIN) 5 MG tablet Take Coumadin daily as per Coumadin clinic follow up.  Patient taking differently: Take 1 tablet by mouth at bedtime 22   Michelle Resendiz MD   atorvastatin (LIPITOR) 20 MG tablet Take 1 tablet by mouth 18   Gaviota Nuno MD   pantoprazole (PROTONIX) 40 MG tablet   daily  4/16/15   Gaviota Nuno MD   tamsulosin (FLOMAX) 0.4 MG capsule   daily   Patient not taking: Reported on 2023 4/22/15   Gaviota Nuno MD        Allergies:     Patient has no known allergies.    Social History:     Tobacco:    reports that he has quit smoking. He has never used smokeless tobacco.  Alcohol:      reports no history of alcohol use.  Drug Use:  reports no history of drug use.    Family History:     Family History   Problem Relation Age of Onset    Colon Cancer Mother     Heart Disease Father        Review of Systems:     Positive and Negative as described in HPI.      Physical Exam:   /87   Pulse (!) 112   Temp 97 °F (36.1 °C) (Oral)   Resp 19   Wt 132.5 kg (292 lb)   SpO2 96%   BMI 36.50 kg/m²   Temp (24hrs), Av °F (36.1 °C), Min:97 °F (36.1 °C), Max:97 °F (36.1 °C)    No results for input(s): \"POCGLU\" in the last 72 hours.    Intake/Output Summary (Last 24 hours) at 2024 0635  Last data filed at 2024 0328  Gross per 24 hour   Intake 5.01 ml   Output --   Net 5.01 ml       General Appearance: alert, well appearing, and in no acute  rate improved after Cardizem.  Continue on Cardizem IV drip.  Cardiology consultation.  Resuming patient warfarin.  Pharmacy to dose  Resuming metoprolol succinate 50 mg daily  Slight elevation of troponin from 11--> 14.  Continue to trend  Ordering echocardiogram.  Patient last echo was in 2022 with EF of 55%.      Acute bronchitis: Chest x-ray concerning for bronchiolitis versus peribronchial edema.  Ordering respiratory culture, viral respiratory panel.  Patient on ceftriaxone and azithromycin.  Checking echocardiogram to rule out CHF.  Denies recent smoking, quit many many years ago.  Denies marijuana use or illicit drug use.    Hypertension: On metoprolol succinate  ANJU: CPAP if needed at night  History of prostate cancer status post prostatectomy  Elevated blood glucose levels: Ordering hemoglobin A1c.      Consultations:   IP CONSULT TO HOSPITALIST  IP CONSULT TO CARDIOLOGY     Patient is admitted as inpatient status because of co-morbidities listed above, severity of signs and symptoms as outlined, requirement for current medical therapies and most importantly because of direct risk to patient if care not provided in a hospital setting.  Expected length of stay > 48 hours.    Diana Be MD  8/27/2024  6:28 AM    Copy sent to Dr. Saeed, Rodrigo FERREIRA MD

## 2024-08-27 NOTE — CONSULTS
[facility]    Consult               Today's Date: 8/27/2024  Patient Name: Nicholas Izaguirre  Date of admission: 8/27/2024  2:04 AM  Patient's age: 69 y.o., 1955  Admission Dx: Atrial fibrillation with RVR (HCC) [I48.91]    Requesting Physician: No admitting provider for patient encounter.    Cardiac Evaluation Reason:  A.fib with RVR and chest pain    History Obtained From: patient and chart review     History of Present Illness:    This patient 69 y.o. years old with past medical history given below. Patient presented to the hospital with chest pain which started around 1 AM while watching TV.  The chest pain was midsternal, 8/10 in intensity, radiating to  left arm, lasted for 45.  In ER patient was found to be in A-fib with RVR. Troponin's are 11 and 14. Cardiology was consulted for further evaluation. On my evaluation patient is on diltiazem drip and chest pain and currently in A.fib with heart rate around 100 - 110.    Past Medical History:   has a past medical history of Adiposity, Atrial fibrillation and flutter (HCC), Atrial fibrillation with RVR (HCC), Cancer (HCC), Chronic kidney disease, Erectile dysfunction following radical prostatectomy, Fatigue, Fear of flying, GERD (gastroesophageal reflux disease), Hyperlipidemia, Hypertension, Osteoarthritis of knee, Overactive bladder, Palpitations, Seasonal allergic rhinitis due to pollen, and Sleep apnea.    Past Surgical History:   has a past surgical history that includes Kidney stone surgery (y-4); hernia repair; joint replacement; Cardioversion; Cardiac surgery; and ablation of dysrhythmic focus (8/13/15).     Home Medications:    Prior to Admission medications    Medication Sig Start Date End Date Taking? Authorizing Provider   metoprolol succinate (TOPROL XL) 50 MG extended release tablet Take 1 tablet by mouth daily 2/2/22   Michelle Resendiz MD   warfarin (COUMADIN) 5 MG tablet Take Coumadin daily as per Coumadin clinic follow  up.  Patient taking differently: Take 1 tablet by mouth at bedtime 2/2/22   Michelle Resendiz MD   atorvastatin (LIPITOR) 20 MG tablet Take 1 tablet by mouth 2/21/18   Gaviota Nuno MD   pantoprazole (PROTONIX) 40 MG tablet   daily  4/16/15   Gaviota Nuno MD   tamsulosin (FLOMAX) 0.4 MG capsule   daily   Patient not taking: Reported on 12/19/2023 4/22/15   Gaviota Nuno MD       Allergies:  Patient has no known allergies.    Social History:   reports that he has quit smoking. He has never used smokeless tobacco. He reports that he does not drink alcohol and does not use drugs.     Family History: family history includes Colon Cancer in his mother; Heart Disease in his father.     REVIEW OF SYSTEMS:    Constitutional: Negative for fatigue, weight loss, loss of appetite   Cardiovascular: as per HPI  Respiratory: as per HPI  Gastrointestinal: Negative for abdominal pain, N/V  Genitourinary: No dysuria, trouble voiding, or hematuria.  Musculoskeletal:  No gait disturbance, No weakness or joint complaints.  Neurological: No headache, diplopia, change in muscle strength, numbness or tingling. No change in gate.   Endocrine: No temperature intolerance. No excessive thirst, fluid intake, or urination. No tremor.  Hematologic/Lymphatic: No abnormal bruising or bleeding    PHYSICAL EXAM:      /75   Pulse 92   Temp 98 °F (36.7 °C) (Oral)   Resp 24   Wt 132.5 kg (292 lb)   SpO2 97%   BMI 36.50 kg/m²    Constitutional and General Appearance: alert, cooperative, in no distress   HEENT: atraumatic, normocephalic.   Respiratory:  Clear to auscultation bilaterally  Cardiovascular:  Regular S1 and S2.  No JVD  Peripheral pulses are symmetrical and full   Abdomen:   Soft, non tender   Bowel sounds present  Extremities:  No Le edema or cyanosis   Neurological:  Deferred     LABS:   CBC:   Recent Labs     08/27/24  0215   WBC 12.1*   HGB 15.9   HCT 49.4   PLT See Reflexed IPF Result     BMP:   Recent  been performed by me. I agree with the assessment, plan and orders as documented by the fellow/resident, after I modified exam findings and plan of treatments, and the final version is my approved version of the assessment.     Additional Comments:    Patient seen examined   Has known history of atrial fibrillation status post ablation in 2015, and recently has been developing more and more increasing episodes of AFib with RVR.    Here with AFib with RVR.    Started to notice some increasing chest pain and shortness of breath  Echo from 02/24 was reviewed   Will change his Cardizem to p.o.   Will plan for a Lexiscan stress test tomorrow a.m.   If his stress test is low risk the patient can be discharged from my standpoint with outpatient follow up with his primary cardiologist at which time they can consider repeat ablation versus possible cardioversion    Discussed with patient in detail at bedside. All questions answered. Patient agrees with plan as outlined above.     Thank you for allowing me to participate in the care of this patient, please do not hesitate to call if you have any questions.    Sg Johnson DO, FACC, RPVI, CHARISSE, DAMIEN  [facility]  [email or web address]  [phone]

## 2024-08-27 NOTE — CONSULTS
[facility]    Consult               Today's Date: 8/27/2024  Patient Name: Nicholas Izaguirre  Date of admission: 8/27/2024  2:04 AM  Patient's age: 69 y.o., 1955  Admission Dx: Atrial fibrillation with RVR (HCC) [I48.91]    Requesting Physician: No admitting provider for patient encounter.    Cardiac Evaluation Reason: A-fib with RVR    History Obtained From: patient and chart review     History of Present Illness:    This patient 69 y.o. years old with past medical history given below.  Patient present was ed to the hospital with chest pain which started around 1 AM while watching TV.  The chest pain was midsternal, 8/10 in intensity, radiating to  left arm, lasted for 45.  In ER patient was found to be in A-fib with RVR. Troponin's are 11 and 14. Cardiology     Past Medical History:   has a past medical history of Adiposity, Atrial fibrillation and flutter (HCC), Atrial fibrillation with RVR (HCC), Cancer (HCC), Chronic kidney disease, Erectile dysfunction following radical prostatectomy, Fatigue, Fear of flying, GERD (gastroesophageal reflux disease), Hyperlipidemia, Hypertension, Osteoarthritis of knee, Overactive bladder, Palpitations, Seasonal allergic rhinitis due to pollen, and Sleep apnea.    Past Surgical History:   has a past surgical history that includes Kidney stone surgery (y-4); hernia repair; joint replacement; Cardioversion; Cardiac surgery; and ablation of dysrhythmic focus (8/13/15).     Home Medications:    Prior to Admission medications    Medication Sig Start Date End Date Taking? Authorizing Provider   metoprolol succinate (TOPROL XL) 50 MG extended release tablet Take 1 tablet by mouth daily 2/2/22   Michelle Resendiz MD   warfarin (COUMADIN) 5 MG tablet Take Coumadin daily as per Coumadin clinic follow up.  Patient taking differently: Take 1 tablet by mouth at bedtime 2/2/22   Michelle Resendiz MD   atorvastatin (LIPITOR) 20 MG tablet Take 1 tablet by mouth 2/21/18    Provider, MD Gaviota   pantoprazole (PROTONIX) 40 MG tablet   daily  4/16/15   Gaviota Nuno MD   tamsulosin (FLOMAX) 0.4 MG capsule   daily   Patient not taking: Reported on 12/19/2023 4/22/15   Gaviota Nuno MD       Allergies:  Patient has no known allergies.    Social History:   reports that he has quit smoking. He has never used smokeless tobacco. He reports that he does not drink alcohol and does not use drugs.     Family History: family history includes Colon Cancer in his mother; Heart Disease in his father.     REVIEW OF SYSTEMS:    Constitutional: Negative for fatigue, weight loss, loss of appetite   Cardiovascular: as per HPI  Respiratory: as per HPI  Gastrointestinal: Negative for abdominal pain, N/V  Genitourinary: No dysuria, trouble voiding, or hematuria.  Musculoskeletal:  No gait disturbance, No weakness or joint complaints.  Neurological: No headache, diplopia, change in muscle strength, numbness or tingling. No change in gate.   Endocrine: No temperature intolerance. No excessive thirst, fluid intake, or urination. No tremor.  Hematologic/Lymphatic: No abnormal bruising or bleeding    PHYSICAL EXAM:      /87   Pulse (!) 112   Temp 97 °F (36.1 °C) (Oral)   Resp 19   Wt 132.5 kg (292 lb)   SpO2 96%   BMI 36.50 kg/m²    Constitutional and General Appearance: alert, cooperative, in no distress   HEENT: atraumatic, normocephalic.   Respiratory:  Clear to auscultation bilaterally  Cardiovascular:  Regular S1 and S2.  No JVD  Peripheral pulses are symmetrical and full   Abdomen:   Soft, non tender   Bowel sounds present  Extremities:  No Le edema or cyanosis   Neurological:  Deferred     LABS:   CBC:   Recent Labs     08/27/24  0215   WBC 12.1*   HGB 15.9   HCT 49.4   PLT See Reflexed IPF Result     BMP:   Recent Labs     08/27/24 0215      K 3.9   CO2 23   BUN 14   CREATININE 1.3*   LABGLOM 61   GLUCOSE 212*     BNP: No results for input(s): \"BNP\" in the last 72

## 2024-08-27 NOTE — PROGRESS NOTES
Pharmacy Note  Warfarin Consult    Nicholas Izaguirre is a 69 y.o. male for whom pharmacy has been consulted to manage warfarin therapy.     Consulting Physician: Diana Be  Reason for Admission: atrial fibrillation    Warfarin dose prior to admission: 5 mg daily  Warfarin indication: atrial fibrillation  Target INR range: 2-3     Past Medical History:   Diagnosis Date    Adiposity 01/20/2017    Atrial fibrillation and flutter (HCC)     Initially DX 2010    Atrial fibrillation with RVR (HCC) 12/19/2023    Cancer (HCC) 08/2015    Prostate Cancer, having OR in 10-15    Chronic kidney disease     Erectile dysfunction following radical prostatectomy 12/19/2016    Fatigue     Fear of flying 01/20/2017    GERD (gastroesophageal reflux disease)     Hyperlipidemia     Hypertension     Osteoarthritis of knee 01/20/2017    Overactive bladder 12/19/2016    Palpitations     Seasonal allergic rhinitis due to pollen 06/25/2020    Sleep apnea     uses C-pap                Recent Labs     08/27/24  0215   INR 2.5     Recent Labs     08/27/24  0215   HGB 15.9   HCT 49.4   PLT See Reflexed IPF Result       Current warfarin drug-drug interactions: azithromycin      Date             INR        Dose   8/27/2024            2.5       5 mg     Daily PT/INR while inpatient. PT/INR ordered to start 8/28.    Thank you for the consult.  Will continue to follow.    Jorge Red Pharm.D., BCCCP

## 2024-08-27 NOTE — ED PROVIDER NOTES
[facility]     Emergency Department     Faculty Attestation    I performed a history and physical examination of the patient and discussed management with the resident. I have reviewed and agree with the resident’s findings including all diagnostic interpretations, and treatment plans as written. Any areas of disagreement are noted on the chart. I was personally present for the key portions of any procedures. I have documented in the chart those procedures where I was not present during the key portions. I have reviewed the emergency nurses triage note. I agree with the chief complaint, past medical history, past surgical history, allergies, medications, social and family history as documented unless otherwise noted below. Documentation of the HPI, Physical Exam and Medical Decision Making performed by margarita is based on my personal performance of the HPI, PE and MDM. For Physician Assistant/ Nurse Practitioner cases/documentation I have personally evaluated this patient and have completed at least one if not all key elements of the E/M (history, physical exam, and MDM). Additional findings are as noted.    Note Started: 2:21 AM EDT     68 yo M irregular heart beat, hx afib, on coumadin, no fever, no injury,   PE tachy, gcs 15 abdomen protuberant, nontender, no rebound, no guarding,  No calf swelling, no calf tenderness    -cardizem / admit, cardiology  EKG Interpretation    Interpreted by me  A-fib, heart rate 128, no ST elevation, normal axis, QTc 455    CRITICAL CARE: There was a high probability of clinically significant/life threatening deterioration in this patient's condition which required my urgent intervention.  Total critical care time was 10 minutes.  This excludes any time for separately reportable procedures.       Elijah Agarwal DO  08/27/24 0224       Elijah Calixto DO  08/27/24 0660

## 2024-08-28 ENCOUNTER — HOSPITAL ENCOUNTER (INPATIENT)
Age: 69
Discharge: HOME | End: 2024-08-30
Payer: MEDICARE

## 2024-08-28 ENCOUNTER — APPOINTMENT (OUTPATIENT)
Dept: NUCLEAR MEDICINE | Age: 69
End: 2024-08-28
Payer: MEDICARE

## 2024-08-28 ENCOUNTER — APPOINTMENT (OUTPATIENT)
Dept: GENERAL RADIOLOGY | Age: 69
End: 2024-08-28
Payer: MEDICARE

## 2024-08-28 LAB
ANION GAP SERPL CALCULATED.3IONS-SCNC: 10 MMOL/L (ref 9–16)
BASOPHILS # BLD: 0.05 K/UL (ref 0–0.2)
BASOPHILS NFR BLD: 1 % (ref 0–2)
BNP SERPL-MCNC: 1810 PG/ML (ref 0–300)
BUN SERPL-MCNC: 22 MG/DL (ref 8–23)
CALCIUM SERPL-MCNC: 8.9 MG/DL (ref 8.6–10.4)
CHLORIDE SERPL-SCNC: 109 MMOL/L (ref 98–107)
CO2 SERPL-SCNC: 25 MMOL/L (ref 20–31)
CREAT SERPL-MCNC: 1.2 MG/DL (ref 0.7–1.2)
EOSINOPHIL # BLD: 0.2 K/UL (ref 0–0.44)
EOSINOPHILS RELATIVE PERCENT: 2 % (ref 1–4)
ERYTHROCYTE [DISTWIDTH] IN BLOOD BY AUTOMATED COUNT: 16.1 % (ref 11.8–14.4)
EST. AVERAGE GLUCOSE BLD GHB EST-MCNC: 108 MG/DL
GFR, ESTIMATED: 63 ML/MIN/1.73M2
GLUCOSE SERPL-MCNC: 91 MG/DL (ref 74–99)
HBA1C MFR BLD: 5.4 % (ref 4–6)
HCT VFR BLD AUTO: 45.3 % (ref 40.7–50.3)
HGB BLD-MCNC: 15.2 G/DL (ref 13–17)
IMM GRANULOCYTES # BLD AUTO: 0.08 K/UL (ref 0–0.3)
IMM GRANULOCYTES NFR BLD: 1 %
INR PPP: 2.7
LYMPHOCYTES NFR BLD: 1.85 K/UL (ref 1.1–3.7)
LYMPHOCYTES RELATIVE PERCENT: 20 % (ref 24–43)
MAGNESIUM SERPL-MCNC: 2 MG/DL (ref 1.6–2.4)
MCH RBC QN AUTO: 28.3 PG (ref 25.2–33.5)
MCHC RBC AUTO-ENTMCNC: 33.6 G/DL (ref 28.4–34.8)
MCV RBC AUTO: 84.2 FL (ref 82.6–102.9)
MONOCYTES NFR BLD: 0.69 K/UL (ref 0.1–1.2)
MONOCYTES NFR BLD: 7 % (ref 3–12)
MYOGLOBIN SERPL-MCNC: 32 NG/ML (ref 28–72)
MYOGLOBIN SERPL-MCNC: 35 NG/ML (ref 28–72)
NEUTROPHILS NFR BLD: 69 % (ref 36–65)
NEUTS SEG NFR BLD: 6.47 K/UL (ref 1.5–8.1)
NRBC BLD-RTO: 0 PER 100 WBC
NUC STRESS EJECTION FRACTION: 46 %
PLATELET # BLD AUTO: ABNORMAL K/UL (ref 138–453)
PLATELET, FLUORESCENCE: 137 K/UL (ref 138–453)
PLATELETS.RETICULATED NFR BLD AUTO: 5.9 % (ref 1.1–10.3)
POTASSIUM SERPL-SCNC: 4.3 MMOL/L (ref 3.7–5.3)
PROCALCITONIN SERPL-MCNC: 0.08 NG/ML (ref 0–0.09)
PROTHROMBIN TIME: 28 SEC (ref 11.7–14.9)
RBC # BLD AUTO: 5.38 M/UL (ref 4.21–5.77)
RBC # BLD: ABNORMAL 10*6/UL
SODIUM SERPL-SCNC: 144 MMOL/L (ref 136–145)
STRESS BASELINE DIAS BP: 104 MMHG
STRESS BASELINE HR: 118 BPM
STRESS BASELINE SYS BP: 136 MMHG
STRESS ESTIMATED WORKLOAD: 1 METS
STRESS PEAK DIAS BP: 102 MMHG
STRESS PEAK SYS BP: 168 MMHG
STRESS PERCENT HR ACHIEVED: 100 %
STRESS POST PEAK HR: 150 BPM
STRESS RATE PRESSURE PRODUCT: NORMAL BPM*MMHG
STRESS TARGET HR: 150 BPM
TID: 1.05
TROPONIN I SERPL HS-MCNC: 13 NG/L (ref 0–22)
TROPONIN I SERPL HS-MCNC: 13 NG/L (ref 0–22)
TROPONIN I SERPL HS-MCNC: 14 NG/L (ref 0–22)
TROPONIN I SERPL HS-MCNC: 16 NG/L (ref 0–22)
TSH SERPL DL<=0.05 MIU/L-ACNC: 1.07 UIU/ML (ref 0.27–4.2)
WBC OTHER # BLD: 9.3 K/UL (ref 3.5–11.3)

## 2024-08-28 PROCEDURE — 83036 HEMOGLOBIN GLYCOSYLATED A1C: CPT

## 2024-08-28 PROCEDURE — 85055 RETICULATED PLATELET ASSAY: CPT

## 2024-08-28 PROCEDURE — 84443 ASSAY THYROID STIM HORMONE: CPT

## 2024-08-28 PROCEDURE — 36415 COLL VENOUS BLD VENIPUNCTURE: CPT

## 2024-08-28 PROCEDURE — 2580000003 HC RX 258: Performed by: STUDENT IN AN ORGANIZED HEALTH CARE EDUCATION/TRAINING PROGRAM

## 2024-08-28 PROCEDURE — 6360000002 HC RX W HCPCS: Performed by: STUDENT IN AN ORGANIZED HEALTH CARE EDUCATION/TRAINING PROGRAM

## 2024-08-28 PROCEDURE — 80048 BASIC METABOLIC PNL TOTAL CA: CPT

## 2024-08-28 PROCEDURE — 3430000000 HC RX DIAGNOSTIC RADIOPHARMACEUTICAL: Performed by: STUDENT IN AN ORGANIZED HEALTH CARE EDUCATION/TRAINING PROGRAM

## 2024-08-28 PROCEDURE — 99233 SBSQ HOSP IP/OBS HIGH 50: CPT | Performed by: NURSE PRACTITIONER

## 2024-08-28 PROCEDURE — 93005 ELECTROCARDIOGRAM TRACING: CPT | Performed by: INTERNAL MEDICINE

## 2024-08-28 PROCEDURE — 2580000003 HC RX 258: Performed by: NURSE PRACTITIONER

## 2024-08-28 PROCEDURE — 85025 COMPLETE CBC W/AUTO DIFF WBC: CPT

## 2024-08-28 PROCEDURE — 84145 PROCALCITONIN (PCT): CPT

## 2024-08-28 PROCEDURE — 6370000000 HC RX 637 (ALT 250 FOR IP): Performed by: INTERNAL MEDICINE

## 2024-08-28 PROCEDURE — 6370000000 HC RX 637 (ALT 250 FOR IP): Performed by: NURSE PRACTITIONER

## 2024-08-28 PROCEDURE — 94660 CPAP INITIATION&MGMT: CPT

## 2024-08-28 PROCEDURE — 85610 PROTHROMBIN TIME: CPT

## 2024-08-28 PROCEDURE — 93016 CV STRESS TEST SUPVJ ONLY: CPT | Performed by: INTERNAL MEDICINE

## 2024-08-28 PROCEDURE — 93017 CV STRESS TEST TRACING ONLY: CPT

## 2024-08-28 PROCEDURE — 83735 ASSAY OF MAGNESIUM: CPT

## 2024-08-28 PROCEDURE — 2500000003 HC RX 250 WO HCPCS

## 2024-08-28 PROCEDURE — A9500 TC99M SESTAMIBI: HCPCS | Performed by: STUDENT IN AN ORGANIZED HEALTH CARE EDUCATION/TRAINING PROGRAM

## 2024-08-28 PROCEDURE — 78452 HT MUSCLE IMAGE SPECT MULT: CPT

## 2024-08-28 PROCEDURE — 84484 ASSAY OF TROPONIN QUANT: CPT

## 2024-08-28 PROCEDURE — 83880 ASSAY OF NATRIURETIC PEPTIDE: CPT

## 2024-08-28 PROCEDURE — 71045 X-RAY EXAM CHEST 1 VIEW: CPT

## 2024-08-28 PROCEDURE — 2060000000 HC ICU INTERMEDIATE R&B

## 2024-08-28 PROCEDURE — 94761 N-INVAS EAR/PLS OXIMETRY MLT: CPT

## 2024-08-28 PROCEDURE — 99232 SBSQ HOSP IP/OBS MODERATE 35: CPT | Performed by: PHYSICIAN ASSISTANT

## 2024-08-28 PROCEDURE — 93018 CV STRESS TEST I&R ONLY: CPT | Performed by: INTERNAL MEDICINE

## 2024-08-28 PROCEDURE — 83874 ASSAY OF MYOGLOBIN: CPT

## 2024-08-28 RX ORDER — SODIUM CHLORIDE 9 MG/ML
500 INJECTION, SOLUTION INTRAVENOUS CONTINUOUS PRN
Status: DISCONTINUED | OUTPATIENT
Start: 2024-08-28 | End: 2024-08-28

## 2024-08-28 RX ORDER — REGADENOSON 0.08 MG/ML
0.4 INJECTION, SOLUTION INTRAVENOUS
Status: COMPLETED | OUTPATIENT
Start: 2024-08-28 | End: 2024-08-28

## 2024-08-28 RX ORDER — NITROGLYCERIN 0.4 MG/1
0.4 TABLET SUBLINGUAL EVERY 5 MIN PRN
Status: DISCONTINUED | OUTPATIENT
Start: 2024-08-28 | End: 2024-08-28

## 2024-08-28 RX ORDER — ALBUTEROL SULFATE 90 UG/1
2 AEROSOL, METERED RESPIRATORY (INHALATION) PRN
Status: DISCONTINUED | OUTPATIENT
Start: 2024-08-28 | End: 2024-08-28

## 2024-08-28 RX ORDER — SODIUM CHLORIDE 0.9 % (FLUSH) 0.9 %
5-40 SYRINGE (ML) INJECTION PRN
Status: DISCONTINUED | OUTPATIENT
Start: 2024-08-28 | End: 2024-08-28

## 2024-08-28 RX ORDER — TETRAKIS(2-METHOXYISOBUTYLISOCYANIDE)COPPER(I) TETRAFLUOROBORATE 1 MG/ML
38 INJECTION, POWDER, LYOPHILIZED, FOR SOLUTION INTRAVENOUS
Status: COMPLETED | OUTPATIENT
Start: 2024-08-28 | End: 2024-08-28

## 2024-08-28 RX ORDER — AMINOPHYLLINE 25 MG/ML
50 INJECTION, SOLUTION INTRAVENOUS PRN
Status: DISCONTINUED | OUTPATIENT
Start: 2024-08-28 | End: 2024-08-28

## 2024-08-28 RX ORDER — TETRAKIS(2-METHOXYISOBUTYLISOCYANIDE)COPPER(I) TETRAFLUOROBORATE 1 MG/ML
17.6 INJECTION, POWDER, LYOPHILIZED, FOR SOLUTION INTRAVENOUS
Status: COMPLETED | OUTPATIENT
Start: 2024-08-28 | End: 2024-08-28

## 2024-08-28 RX ORDER — SODIUM CHLORIDE 0.9 % (FLUSH) 0.9 %
10 SYRINGE (ML) INJECTION PRN
Status: DISCONTINUED | OUTPATIENT
Start: 2024-08-28 | End: 2024-08-28 | Stop reason: SDUPTHER

## 2024-08-28 RX ORDER — METOPROLOL TARTRATE 1 MG/ML
5 INJECTION, SOLUTION INTRAVENOUS EVERY 5 MIN PRN
Status: DISCONTINUED | OUTPATIENT
Start: 2024-08-28 | End: 2024-08-28

## 2024-08-28 RX ORDER — DILTIAZEM HYDROCHLORIDE 120 MG/1
120 TABLET, FILM COATED ORAL EVERY 8 HOURS SCHEDULED
Qty: 90 TABLET | Refills: 0 | Status: CANCELLED | OUTPATIENT
Start: 2024-08-28 | End: 2024-09-27

## 2024-08-28 RX ORDER — ATROPINE SULFATE 0.1 MG/ML
0.5 INJECTION INTRAVENOUS EVERY 5 MIN PRN
Status: DISCONTINUED | OUTPATIENT
Start: 2024-08-28 | End: 2024-08-28

## 2024-08-28 RX ORDER — WARFARIN SODIUM 5 MG/1
5 TABLET ORAL DAILY
Status: DISCONTINUED | OUTPATIENT
Start: 2024-08-28 | End: 2024-08-30 | Stop reason: HOSPADM

## 2024-08-28 RX ORDER — METOPROLOL TARTRATE 50 MG
50 TABLET ORAL 2 TIMES DAILY
Status: DISCONTINUED | OUTPATIENT
Start: 2024-08-28 | End: 2024-08-29

## 2024-08-28 RX ADMIN — SODIUM CHLORIDE, PRESERVATIVE FREE 10 ML: 5 INJECTION INTRAVENOUS at 20:35

## 2024-08-28 RX ADMIN — POLYETHYLENE GLYCOL 3350 17 G: 17 POWDER, FOR SOLUTION ORAL at 20:32

## 2024-08-28 RX ADMIN — ATORVASTATIN CALCIUM 20 MG: 20 TABLET, FILM COATED ORAL at 20:33

## 2024-08-28 RX ADMIN — METOPROLOL TARTRATE 5 MG: 5 INJECTION INTRAVENOUS at 18:21

## 2024-08-28 RX ADMIN — SODIUM CHLORIDE, PRESERVATIVE FREE 10 ML: 5 INJECTION INTRAVENOUS at 11:18

## 2024-08-28 RX ADMIN — SODIUM CHLORIDE, PRESERVATIVE FREE 10 ML: 5 INJECTION INTRAVENOUS at 11:13

## 2024-08-28 RX ADMIN — WARFARIN SODIUM 5 MG: 5 TABLET ORAL at 18:22

## 2024-08-28 RX ADMIN — METOPROLOL TARTRATE 50 MG: 50 TABLET, FILM COATED ORAL at 20:33

## 2024-08-28 RX ADMIN — TETRAKIS(2-METHOXYISOBUTYLISOCYANIDE)COPPER(I) TETRAFLUOROBORATE 38 MILLICURIE: 1 INJECTION, POWDER, LYOPHILIZED, FOR SOLUTION INTRAVENOUS at 11:18

## 2024-08-28 RX ADMIN — DILTIAZEM HYDROCHLORIDE 120 MG: 60 TABLET, FILM COATED ORAL at 21:02

## 2024-08-28 RX ADMIN — METOPROLOL TARTRATE 5 MG: 5 INJECTION INTRAVENOUS at 13:24

## 2024-08-28 RX ADMIN — PANTOPRAZOLE SODIUM 40 MG: 40 TABLET, DELAYED RELEASE ORAL at 07:44

## 2024-08-28 RX ADMIN — SODIUM CHLORIDE, PRESERVATIVE FREE 10 ML: 5 INJECTION INTRAVENOUS at 07:42

## 2024-08-28 RX ADMIN — REGADENOSON 0.4 MG: 0.08 INJECTION, SOLUTION INTRAVENOUS at 11:13

## 2024-08-28 RX ADMIN — Medication 1000 MG: at 09:08

## 2024-08-28 RX ADMIN — TETRAKIS(2-METHOXYISOBUTYLISOCYANIDE)COPPER(I) TETRAFLUOROBORATE 17.6 MILLICURIE: 1 INJECTION, POWDER, LYOPHILIZED, FOR SOLUTION INTRAVENOUS at 07:42

## 2024-08-28 RX ADMIN — METOPROLOL TARTRATE 5 MG: 5 INJECTION INTRAVENOUS at 03:56

## 2024-08-28 RX ADMIN — DILTIAZEM HYDROCHLORIDE 120 MG: 60 TABLET, FILM COATED ORAL at 13:24

## 2024-08-28 RX ADMIN — DILTIAZEM HYDROCHLORIDE 120 MG: 60 TABLET, FILM COATED ORAL at 04:08

## 2024-08-28 RX ADMIN — AZITHROMYCIN DIHYDRATE 500 MG: 500 INJECTION, POWDER, LYOPHILIZED, FOR SOLUTION INTRAVENOUS at 08:06

## 2024-08-28 RX ADMIN — SODIUM CHLORIDE, PRESERVATIVE FREE 10 ML: 5 INJECTION INTRAVENOUS at 07:47

## 2024-08-28 RX ADMIN — METOPROLOL SUCCINATE 50 MG: 25 TABLET, EXTENDED RELEASE ORAL at 07:45

## 2024-08-28 ASSESSMENT — ENCOUNTER SYMPTOMS
RHINORRHEA: 0
NAUSEA: 0
SORE THROAT: 0
WHEEZING: 0
ABDOMINAL PAIN: 0
SHORTNESS OF BREATH: 0
VOMITING: 0
COUGH: 1

## 2024-08-28 NOTE — PLAN OF CARE
Problem: Discharge Planning  Goal: Discharge to home or other facility with appropriate resources  8/28/2024 1158 by Adrienne Diaz RN  Outcome: Progressing  Flowsheets (Taken 8/28/2024 0700)  Discharge to home or other facility with appropriate resources:   Identify barriers to discharge with patient and caregiver   Arrange for needed discharge resources and transportation as appropriate   Identify discharge learning needs (meds, wound care, etc)  8/28/2024 0544 by Nancy Estrada, RN  Outcome: Progressing  Flowsheets (Taken 8/27/2024 1545 by Adrienne Diaz, RN)  Discharge to home or other facility with appropriate resources:   Identify barriers to discharge with patient and caregiver   Arrange for needed discharge resources and transportation as appropriate   Identify discharge learning needs (meds, wound care, etc)   Arrange for interpreters to assist at discharge as needed     Problem: Safety - Adult  Goal: Free from fall injury  8/28/2024 1158 by Adrienne Diaz RN  Outcome: Progressing  8/28/2024 0544 by Nancy Estrada RN  Outcome: Progressing     Problem: Cardiovascular - Adult  Goal: Maintains optimal cardiac output and hemodynamic stability  8/28/2024 1158 by Adrienne iDaz RN  Outcome: Progressing  Flowsheets (Taken 8/28/2024 0700)  Maintains optimal cardiac output and hemodynamic stability:   Monitor blood pressure and heart rate   Monitor urine output and notify Licensed Independent Practitioner for values outside of normal range   Assess for signs of decreased cardiac output  8/28/2024 0544 by Nancy Estrada RN  Outcome: Progressing  Goal: Absence of cardiac dysrhythmias or at baseline  8/28/2024 1158 by Adrienne Diaz RN  Outcome: Progressing  Flowsheets (Taken 8/28/2024 0700)  Absence of cardiac dysrhythmias or at baseline:   Monitor cardiac rate and rhythm   Assess for signs of decreased cardiac output   Administer antiarrhythmia medication and electrolyte replacement as ordered  8/28/2024

## 2024-08-28 NOTE — DISCHARGE INSTRUCTIONS
Please follow up with your PCP within 7 days of discharge  Please follow up with your cardiologist within 7 days of discharge  Please review your medications and take as prescribed  If you begin to experience worsening chest pain or shortness of breath please return to the emergency department immediately

## 2024-08-28 NOTE — PROGRESS NOTES
[facility]  Progress Note                   Date:   8/28/2024  Patient name: Nicholas Izaguirre  Date of admission:  8/27/2024  2:04 AM  MRN:   [id number]  YOB: 1955  PCP: Rodrigo Saeed MD    Reason for Admission: Persistent atrial fibrillation (HCC) [I48.19]  Atrial fibrillation with rapid ventricular response (HCC) [I48.91]  Atrial fibrillation with RVR (HCC) [I48.91]  Chest pain, unspecified type [R07.9]    Subjective:       Clinical Changes /Abnormalities: seen & examined in stress lab. No acute CV issues/concerns overnight. Labs, vitals, & tel erviewed. Remains Afib 100-120s at rest but states asymptomatic    Review of Systems    Medications:   Scheduled Meds:   pantoprazole  40 mg Oral Daily    atorvastatin  20 mg Oral Nightly    metoprolol succinate  50 mg Oral Daily    sodium chloride flush  5-40 mL IntraVENous 2 times per day    azithromycin  500 mg IntraVENous Q24H    dilTIAZem  120 mg Oral 3 times per day    warfarin placeholder: dosing by pharmacy   Other RX Placeholder    cefTRIAXone (ROCEPHIN) IV  1,000 mg IntraVENous Q24H     Continuous Infusions:   sodium chloride       CBC:   Recent Labs     08/27/24  0215 08/28/24  0612   WBC 12.1* 9.3   HGB 15.9 15.2   PLT See Reflexed IPF Result See Reflexed IPF Result     BMP:    Recent Labs     08/27/24  0215 08/28/24  0612    144   K 3.9 4.3   * 109*   CO2 23 25   BUN 14 22   CREATININE 1.3* 1.2   GLUCOSE 212* 91     Hepatic:No results for input(s): \"AST\", \"ALT\", \"BILITOT\", \"ALKPHOS\" in the last 72 hours.    Invalid input(s): \"ALB\"  Troponin:   Recent Labs     08/27/24  1303 08/28/24  0144 08/28/24  0612   TROPHS 21 14 13     BNP: No results for input(s): \"BNP\" in the last 72 hours.  Lipids: No results for input(s): \"CHOL\", \"HDL\" in the last 72 hours.    Invalid input(s): \"LDLCALCU\"  INR:   Recent Labs     08/27/24  0215 08/28/24  0144   INR 2.5 2.7       Objective:   Vitals: /64   Pulse (!) 126   Temp  97.5 °F (36.4 °C) (Oral)   Resp 18   Ht 1.88 m (6' 2\")   Wt 132.5 kg (292 lb)   SpO2 94%   BMI 37.49 kg/m²   General appearance: alert and cooperative with exam  HEENT: Head: Normocephalic, no lesions, without obvious abnormality.  Neck:no JVD, trachea midline, no adenopathy  Lungs: Clear to auscultation  Heart: Irregular rate and rhythm, s1/s2 auscultated, no murmurs  Abdomen: soft, non-tender, bowel sounds active  Extremities: no edema  Neurologic: not done    ECHO: 02/13/2024   Left Ventricle: There is mild increased wall thickness/hypertrophy.   Systolic function is normal with an ejection fraction of 60-65%. No   segmental wall motion abnormalities.     Mitral Valve: There is trace regurgitation. There is no evidence of   mitral valve stenosis.     Tricuspid Valve: There is trace to mild regurgitation. RVSP calculated   at 28 mmHg. RVSP is based on RA pressure of 3 mmHg.     Pulmonic Valve: There is trace regurgitation. There is no evidence of   pulmonic valve stenosis.     Aorta: The aortic root is mildly dilated (4.0 cm).     Pericardium: There is no pericardial effusion. A large size cyst in the   liver  is visible.      Stress Test:   not obtained.     Cardiac Angiography:   Not obtained.    Assessment / Acute Cardiac Problems:   A-fib with RVR  Hx of chronic A-fib s/p ablation 08/13/2015  Essential hypertension  Hyperlipidemia  ANJU, on CPAP    Patient Active Problem List:     Chest pain     Paroxysmal atrial fibrillation (HCC)     Acute vasomotor rhinitis     Adenocarcinoma of prostate (HCC)     Morbid obesity due to excess calories (HCC)     Cardiomyopathy (HCC)     Dermatitis     Eczema     Erectile dysfunction following radical prostatectomy     Fear of flying     Gastroesophageal reflux disease without esophagitis     Herpes simplex type 1 infection     Mixed hyperlipidemia     Essential hypertension     Intestinal volvulus (HCC)     Obstructive sleep apnea syndrome     Osteoarthritis of knee

## 2024-08-28 NOTE — PROGRESS NOTES
Pharmacy Note  Warfarin Consult follow-up      Recent Labs     08/28/24  0144   INR 2.7     Recent Labs     08/27/24  0215 08/28/24  0612   HGB 15.9 15.2   HCT 49.4 45.3   PLT See Reflexed IPF Result See Reflexed IPF Result       Significant Drug-Drug Interactions:  New warfarin drug-drug interactions: azithromycin   Discontinued drug-drug interactions: none      Notes:                   Will give a  dose of 5 mg daily   Daily PT/INR while inpatient.

## 2024-08-28 NOTE — PLAN OF CARE
Problem: Discharge Planning  Goal: Discharge to home or other facility with appropriate resources  Outcome: Progressing    Problem: Safety - Adult  Goal: Free from fall injury  Outcome: Progressing     Problem: Cardiovascular - Adult  Goal: Maintains optimal cardiac output and hemodynamic stability  Outcome: Progressing  Goal: Absence of cardiac dysrhythmias or at baseline  Outcome: Progressing

## 2024-08-28 NOTE — PROGRESS NOTES
[facility]  Office: [phone]  Jessee Lan DO, Anish Jack DO, Ángel Colbert DO, Laurent Root DO, Mile Gibbons MD, Angle Burciaga MD, Bello Belle MD, Catherine Woods MD,  Bryan Adamson MD, Dennis Avila MD, Jimmie Temple MD,  Cesar Perry DO, Divya Woodruff MD, Reyes Roldan MD, Romaine Lan DO, Brenda Beverly MD,  Juno Pearce DO, Irene Parisi MD, Essie Milligan MD, Sridevi Woodall MD, Awa Rogers MD,  Johnie Barrios MD, Diana Be MD, Stan Alejandre MD, Paulino Ham MD, Zeb Isbell MD, Carmen Joshua MD, Vijay Quesada DO, Malcolm Flores DO, Kristofer Costa MD,  Reid Rubio MD, Shirley Waterhouse, CNP,  Kimberley Negron, CNP, Mohit Alexandra, CNP,  Berta Quiroz, DNP, Zully Winn, CNP, Qing Duong, CNP, Joanne Mckeon CNP, Tammy Galvan, CNP, Elizabeth Allred, CNP, Shereen Dominique, PA-C, Chel Obrien, PA-C, Mell Dutta, CNP, Sharmila Carmona, CNS, Carmen Brito, CNP, Kristyn Villatoro, CNP, Tracy Schwab, CNP         [facility]   IN-PATIENT SERVICE   [facility]    Progress Note    8/28/2024    8:13 AM    Name:   Nicholas Izaguirre  MRN:     [id number]     Acct:      [id number]   Room:   [address] [place] [address] Day:  1  Admit Date:  8/27/2024  2:04 AM    PCP:   Rodrigo Saeed MD  Code Status:  Full Code    Subjective:     C/C:   Chief Complaint   Patient presents with    Irregular Heart Beat     States that he has a-fib and was having chest pain earlier      Interval History Status: not changed.     Patient seen and examined at bedside. No acute events overnight. Upon evaluation, patient noted to be in afib with RVR, -170s. Patient complaining of palpitations and left-sided chest pain. Denies dizziness or shortness of breath.    Brief History:     Nicholas Izaguirre is a 69 y.o. Non- / non  male who presents with Irregular Heart Beat and is admitted to the hospital for the management of Atrial fibrillation with  (ROCEPHIN) IV  1,000 mg IntraVENous Q24H     Continuous Infusions:    sodium chloride       PRN Meds: sodium chloride flush, sodium chloride, ondansetron **OR** ondansetron, polyethylene glycol, acetaminophen **OR** acetaminophen, metoprolol    Data:     Past Medical History:   has a past medical history of Adiposity, Atrial fibrillation and flutter (HCC), Atrial fibrillation with RVR (HCC), Cancer (HCC), Chronic kidney disease, Erectile dysfunction following radical prostatectomy, Fatigue, Fear of flying, GERD (gastroesophageal reflux disease), Hyperlipidemia, Hypertension, Osteoarthritis of knee, Overactive bladder, Palpitations, Seasonal allergic rhinitis due to pollen, and Sleep apnea.    Social History:   reports that he has quit smoking. He has never used smokeless tobacco. He reports that he does not drink alcohol and does not use drugs.     Family History:   Family History   Problem Relation Age of Onset    Colon Cancer Mother     Heart Disease Father        Vitals:  /64   Pulse (!) 126   Temp 97.5 °F (36.4 °C) (Oral)   Resp 18   Ht 1.88 m (6' 2\")   Wt 132.5 kg (292 lb)   SpO2 94%   BMI 37.49 kg/m²   Temp (24hrs), Av.7 °F (36.5 °C), Min:97.5 °F (36.4 °C), Max:98.1 °F (36.7 °C)    No results for input(s): \"POCGLU\" in the last 72 hours.    I/O (24Hr):    Intake/Output Summary (Last 24 hours) at 2024 0813  Last data filed at 2024 0408  Gross per 24 hour   Intake 1636.25 ml   Output 800 ml   Net 836.25 ml       Labs:  Hematology:  Recent Labs     24  014   WBC 12.1*  --    RBC 5.69  --    HGB 15.9  --    HCT 49.4  --    MCV 86.8  --    MCH 27.9  --    MCHC 32.2  --    RDW 15.7*  --    PLT See Reflexed IPF Result  --    INR 2.5 2.7     Chemistry:  Recent Labs     24  0215 24  0333 24  1303 24  0144 24  0612     --   --   --   --    K 3.9  --   --   --   --    *  --   --   --   --    CO2 23  --   --   --   --    GLUCOSE 212*

## 2024-08-29 LAB
ANION GAP SERPL CALCULATED.3IONS-SCNC: 9 MMOL/L (ref 9–16)
BASOPHILS # BLD: 0.06 K/UL (ref 0–0.2)
BASOPHILS NFR BLD: 1 % (ref 0–2)
BUN SERPL-MCNC: 23 MG/DL (ref 8–23)
CALCIUM SERPL-MCNC: 9.1 MG/DL (ref 8.6–10.4)
CHLORIDE SERPL-SCNC: 108 MMOL/L (ref 98–107)
CO2 SERPL-SCNC: 22 MMOL/L (ref 20–31)
CREAT SERPL-MCNC: 1.1 MG/DL (ref 0.7–1.2)
EKG ATRIAL RATE: 182 BPM
EKG Q-T INTERVAL: 286 MS
EKG QRS DURATION: 86 MS
EKG QTC CALCULATION (BAZETT): 445 MS
EKG R AXIS: 33 DEGREES
EKG T AXIS: 45 DEGREES
EKG VENTRICULAR RATE: 146 BPM
EOSINOPHIL # BLD: 0.23 K/UL (ref 0–0.44)
EOSINOPHILS RELATIVE PERCENT: 2 % (ref 1–4)
ERYTHROCYTE [DISTWIDTH] IN BLOOD BY AUTOMATED COUNT: 15.8 % (ref 11.8–14.4)
GFR, ESTIMATED: 71 ML/MIN/1.73M2
GLUCOSE SERPL-MCNC: 87 MG/DL (ref 74–99)
HCT VFR BLD AUTO: 47.4 % (ref 40.7–50.3)
HGB BLD-MCNC: 15.5 G/DL (ref 13–17)
IMM GRANULOCYTES # BLD AUTO: 0.11 K/UL (ref 0–0.3)
IMM GRANULOCYTES NFR BLD: 1 %
INR PPP: 2.3
LYMPHOCYTES NFR BLD: 1.7 K/UL (ref 1.1–3.7)
LYMPHOCYTES RELATIVE PERCENT: 17 % (ref 24–43)
MCH RBC QN AUTO: 28 PG (ref 25.2–33.5)
MCHC RBC AUTO-ENTMCNC: 32.7 G/DL (ref 28.4–34.8)
MCV RBC AUTO: 85.6 FL (ref 82.6–102.9)
MONOCYTES NFR BLD: 0.65 K/UL (ref 0.1–1.2)
MONOCYTES NFR BLD: 7 % (ref 3–12)
NEUTROPHILS NFR BLD: 72 % (ref 36–65)
NEUTS SEG NFR BLD: 7.17 K/UL (ref 1.5–8.1)
NRBC BLD-RTO: 0 PER 100 WBC
PLATELET # BLD AUTO: 146 K/UL (ref 138–453)
PMV BLD AUTO: 10.6 FL (ref 8.1–13.5)
POTASSIUM SERPL-SCNC: 3.9 MMOL/L (ref 3.7–5.3)
PROTHROMBIN TIME: 24.8 SEC (ref 11.7–14.9)
RBC # BLD AUTO: 5.54 M/UL (ref 4.21–5.77)
RBC # BLD: ABNORMAL 10*6/UL
SODIUM SERPL-SCNC: 139 MMOL/L (ref 136–145)
WBC OTHER # BLD: 9.9 K/UL (ref 3.5–11.3)

## 2024-08-29 PROCEDURE — 6370000000 HC RX 637 (ALT 250 FOR IP): Performed by: NURSE PRACTITIONER

## 2024-08-29 PROCEDURE — 80048 BASIC METABOLIC PNL TOTAL CA: CPT

## 2024-08-29 PROCEDURE — 85025 COMPLETE CBC W/AUTO DIFF WBC: CPT

## 2024-08-29 PROCEDURE — 2500000003 HC RX 250 WO HCPCS

## 2024-08-29 PROCEDURE — 6370000000 HC RX 637 (ALT 250 FOR IP): Performed by: INTERNAL MEDICINE

## 2024-08-29 PROCEDURE — 2060000000 HC ICU INTERMEDIATE R&B

## 2024-08-29 PROCEDURE — 2580000003 HC RX 258: Performed by: NURSE PRACTITIONER

## 2024-08-29 PROCEDURE — 85610 PROTHROMBIN TIME: CPT

## 2024-08-29 PROCEDURE — 99233 SBSQ HOSP IP/OBS HIGH 50: CPT | Performed by: NURSE PRACTITIONER

## 2024-08-29 PROCEDURE — 99232 SBSQ HOSP IP/OBS MODERATE 35: CPT

## 2024-08-29 PROCEDURE — 36415 COLL VENOUS BLD VENIPUNCTURE: CPT

## 2024-08-29 RX ORDER — METOPROLOL TARTRATE 50 MG
50 TABLET ORAL ONCE
Status: COMPLETED | OUTPATIENT
Start: 2024-08-29 | End: 2024-08-29

## 2024-08-29 RX ORDER — METOPROLOL TARTRATE 50 MG
100 TABLET ORAL 2 TIMES DAILY
Status: DISCONTINUED | OUTPATIENT
Start: 2024-08-29 | End: 2024-08-30 | Stop reason: HOSPADM

## 2024-08-29 RX ADMIN — METOPROLOL TARTRATE 50 MG: 50 TABLET, FILM COATED ORAL at 09:51

## 2024-08-29 RX ADMIN — SODIUM CHLORIDE, PRESERVATIVE FREE 10 ML: 5 INJECTION INTRAVENOUS at 21:19

## 2024-08-29 RX ADMIN — DILTIAZEM HYDROCHLORIDE 120 MG: 60 TABLET, FILM COATED ORAL at 14:42

## 2024-08-29 RX ADMIN — SODIUM CHLORIDE, PRESERVATIVE FREE 10 ML: 5 INJECTION INTRAVENOUS at 09:45

## 2024-08-29 RX ADMIN — WARFARIN SODIUM 5 MG: 5 TABLET ORAL at 18:29

## 2024-08-29 RX ADMIN — METOPROLOL TARTRATE 100 MG: 50 TABLET, FILM COATED ORAL at 19:19

## 2024-08-29 RX ADMIN — DILTIAZEM HYDROCHLORIDE 120 MG: 60 TABLET, FILM COATED ORAL at 21:18

## 2024-08-29 RX ADMIN — ATORVASTATIN CALCIUM 20 MG: 20 TABLET, FILM COATED ORAL at 21:18

## 2024-08-29 RX ADMIN — METOPROLOL TARTRATE 5 MG: 5 INJECTION INTRAVENOUS at 04:32

## 2024-08-29 RX ADMIN — DILTIAZEM HYDROCHLORIDE 120 MG: 60 TABLET, FILM COATED ORAL at 05:47

## 2024-08-29 RX ADMIN — METOPROLOL TARTRATE 50 MG: 50 TABLET, FILM COATED ORAL at 11:49

## 2024-08-29 RX ADMIN — PANTOPRAZOLE SODIUM 40 MG: 40 TABLET, DELAYED RELEASE ORAL at 09:44

## 2024-08-29 RX ADMIN — ACETAMINOPHEN 650 MG: 325 TABLET ORAL at 21:23

## 2024-08-29 ASSESSMENT — PAIN DESCRIPTION - DESCRIPTORS: DESCRIPTORS: SORE

## 2024-08-29 ASSESSMENT — PAIN DESCRIPTION - LOCATION
LOCATION: NECK
LOCATION: NECK

## 2024-08-29 ASSESSMENT — PAIN SCALES - GENERAL
PAINLEVEL_OUTOF10: 6
PAINLEVEL_OUTOF10: 6

## 2024-08-29 NOTE — PLAN OF CARE
Problem: Discharge Planning  Goal: Discharge to home or other facility with appropriate resources  8/29/2024 0009 by Nancy Estrada RN  Outcome: Progressing     Problem: Safety - Adult  Goal: Free from fall injury  8/29/2024 0009 by Nancy Estrada RN  Outcome: Progressing     Problem: Cardiovascular - Adult  Goal: Maintains optimal cardiac output and hemodynamic stability  8/29/2024 0009 by Nancy Estrada RN  Outcome: Progressing  Goal: Absence of cardiac dysrhythmias or at baseline  8/29/2024 0009 by Nancy Estrada RN  Outcome: Progressing

## 2024-08-29 NOTE — PROGRESS NOTES
..  [facility]  Office: [phone]  Jessee Lan DO, Anish Jack, DO, Ángel Colbert DO, Laurent Root DO, Mile Gibbons MD, Angle Burciaga MD, Bello Belle MD, Catherine Woods MD,  Bryan Adamson MD, Dennis Avila MD, Jimmie Temple MD,  Cesar Perry DO, Divya Woodruff MD, Reyes Roldan MD, Romaine Lan DO, Brenda Beverly MD,  Juno Pearce DO, Irene Parisi MD, Essie Milligan MD, Sridevi Woodall MD, Awa Rogers MD,  Johnie Barrios MD, Diana Be MD, Stan Alejandre MD, Paulino Ham MD, Zeb Isbell MD, Carmen Joshua MD, Mohit Palmer DO, Vijay Quesada DO, Malcolm Flores DO, Kristofer Costa MD,  Reid Rubio MD, Shirley Waterhouse, CNP,  Kimberley Negron, CNP, Mohit Alexandra, CNP,  Berta Quiroz, DNP, Zully Winn, CNP, Qing Duong, CNP, Tammy Galvan, CNP, Elizabeth Allred, CNP, Shereen Dominique, PA-C, Chel Obrien, PA-C, Krysta Menjivar, CNP, Roula Gallegos, CNP,  Ivonne Crawford, CNP, Mell Dutta, CNP, Joanne Mckeon, CNP, Sharmila Carmona, CNS, Carmen Brito, CNP, Kristyn Villatoro, CNP, Tracy Schwab, CNP         [facility]   IN-PATIENT SERVICE   [facility]    Progress Note    8/29/2024    7:57 AM    Name:   Nicholas Izaguirre  MRN:     [id number]     Acct:      [id number]   Room:   0503/0503-01   Day:  2  Admit Date:  8/27/2024  2:04 AM    PCP:   Rodrigo Saeed MD  Code Status:  Full Code    Subjective:     C/C:   Chief Complaint   Patient presents with    Irregular Heart Beat     States that he has a-fib and was having chest pain earlier      Interval History Status: improved.     Patient reports feeling overall improvement.  Denies current productive cough or fatigue.  Patient does however continue to report palpitations and heart rate continues to fluctuate up to 130s.  Patient has history of requiring multiple cardioversions.  Awaiting cardiology consult prior to resuming diet or discharge.  Is also noted his ejection  results may increase or decrease the risk stratification reported for this examination.  Risk stratification criteria are adapted from \"Noninvasive Risk Stratification\" criteria from Cox et.  Al, ACC/AATS/AHA/ASE/ASNC/SCAI/SCCT/STS 2017 Appropriate Use Criteria For Coronary Revascularization in Patients With Stable Ischemic Heart Disease  [facility] Volume 69, Issue 17, May 2017 High risk (>3% annual death or MI) 1.  Severe resting LV dysfunction (LVEF >35%) not readily explained by non coronary causes 2.  Resting perfusion abnormalities greater than 10% of the myocardium in patients without prior history or evidence of MI 3.  Stress-induced perfusion abnormalities encumbering greater than or equal to 10% myocardium or stress segmental scores indicating multiple vascular territories with abnormalities 4.  Stress-induced LV dilatation (TID ratio greater than 1.19 for exercise and greater than 1.39 for regadenoson) Intermediate risk (1% to 3% annual death or MI) 1.  Mild/moderate resting LV dysfunction (LVEF 35% to 49%) not readily explained by non coronary causes.  2.  Resting perfusion abnormalities in 5%-9.9% of the myocardium in patients without a history or prior evidence of MI 3.  Stress-induced perfusion abnormality encumbering 5%-9.9% of the myocardium or stress segmental scores indicating 1 vascular territory with abnormalities but without LV dilation 4.  Small wall motion abnormality involving 1-2 segments and only 1 coronary bed.  Low Risk (Less than 1% annual death or MI) 1. Normal or small myocardial perfusion defect at rest or with stress encumbering less than 5% of the myocardium.  RECOMMENDATIONS:     XR CHEST PORTABLE    Result Date: 8/28/2024  No acute cardiopulmonary findings     XR CHEST PORTABLE    Result Date: 8/27/2024  Prominent peribronchial markings seen bilaterally which may represent peribronchial edema or bronchiolitis.       Physical Examination:        Physical Exam  Vitals reviewed.    Constitutional:       General: He is not in acute distress.     Appearance: Normal appearance. He is not ill-appearing.   HENT:      Head: Normocephalic and atraumatic.   Cardiovascular:      Rate and Rhythm: Tachycardia present. Rhythm irregular.      Pulses: Normal pulses.      Heart sounds: Normal heart sounds. No murmur heard.     No friction rub.   Pulmonary:      Effort: Pulmonary effort is normal. No respiratory distress.      Breath sounds: Normal breath sounds. No wheezing.   Abdominal:      General: Abdomen is flat. There is no distension.      Palpations: Abdomen is soft.      Tenderness: There is no abdominal tenderness.   Skin:     General: Skin is warm and dry.   Neurological:      General: No focal deficit present.      Mental Status: He is alert.   Psychiatric:         Mood and Affect: Mood normal.         Behavior: Behavior normal.   Assessment:        Hospital Problems             Last Modified POA    * (Principal) Atrial fibrillation with RVR (HCC) 8/27/2024 Yes    Persistent atrial fibrillation (HCC) 8/27/2024 Yes    Atrial fibrillation with rapid ventricular response (HCC) 8/27/2024 Yes    Chest pain 8/27/2024 Yes    Essential hypertension 8/27/2024 Yes    Obstructive sleep apnea syndrome 8/27/2024 Yes    Overactive bladder 8/27/2024 Yes    Overview Signed 1/11/2018  3:32 PM by Mitzi Shi     Overview:   Worsening overactive bladder with the use of radiation therapy.  Spontaneously improved.  Currently stable with stress incontinence 2 pads per day - stable.  We did talk about considering a male sling which he was going to consider            Plan:        Atrial fibrillation with RVR - continue PO cardizem by cardiology team. PT is high 90s, with MAP>65. Underwent stress yesterday. Continue Warfarin and Metoprolol. Patient may need cardioversion prior to discharge.      Acute bronchitis - resolved.     Elevated creatinine, resolved - continue gently IV fluids      Elevated blood glucose

## 2024-08-29 NOTE — PROGRESS NOTES
[facility]  Progress Note                   Date:   8/29/2024  Patient name: Nicholas Izaguirre  Date of admission:  8/27/2024  2:04 AM  MRN:   [id number]  YOB: 1955  PCP: Rodrigo Saeed MD    Reason for Admission: Persistent atrial fibrillation (HCC) [I48.19]  Atrial fibrillation with rapid ventricular response (HCC) [I48.91]  Atrial fibrillation with RVR (HCC) [I48.91]  Chest pain, unspecified type [R07.9]    Subjective:       Clinical Changes /Abnormalities: Pt seen and examined in the room.  Patient resting in bed. Pt denies any CP or sob.  Labs, vitals and tele reviewed- A-fib 111    Medications:   Scheduled Meds:   metoprolol tartrate  50 mg Oral BID    warfarin  5 mg Oral Daily    pantoprazole  40 mg Oral Daily    atorvastatin  20 mg Oral Nightly    sodium chloride flush  5-40 mL IntraVENous 2 times per day    dilTIAZem  120 mg Oral 3 times per day    warfarin placeholder: dosing by pharmacy   Other RX Placeholder     Continuous Infusions:   sodium chloride       CBC:   Recent Labs     08/27/24  0215 08/28/24  0612 08/29/24  0559   WBC 12.1* 9.3 9.9   HGB 15.9 15.2 15.5   PLT See Reflexed IPF Result See Reflexed IPF Result 146     BMP:    Recent Labs     08/27/24  0215 08/28/24  0612 08/29/24  0559    144 139   K 3.9 4.3 3.9   * 109* 108*   CO2 23 25 22   BUN 14 22 23   CREATININE 1.3* 1.2 1.1   GLUCOSE 212* 91 87     Hepatic:No results for input(s): \"AST\", \"ALT\", \"BILITOT\", \"ALKPHOS\" in the last 72 hours.    Invalid input(s): \"ALB\"  Troponin:   Recent Labs     08/28/24  0148 08/28/24  0612 08/28/24  1456   TROPHS 16 13 13     BNP: No results for input(s): \"BNP\" in the last 72 hours.  Lipids: No results for input(s): \"CHOL\", \"HDL\" in the last 72 hours.    Invalid input(s): \"LDLCALCU\"  INR:   Recent Labs     08/27/24  0215 08/28/24  0144 08/29/24  0852   INR 2.5 2.7 2.3       Objective:   Vitals: /71   Pulse (!) 129 Comment: see MAR  Temp 97.7 °F (36.5  °C)   Resp 18   Ht 1.88 m (6' 2\")   Wt 132.5 kg (292 lb)   SpO2 96%   BMI 37.49 kg/m²   General appearance: alert and cooperative with exam  HEENT: Head: Normocephalic, no lesions, without obvious abnormality.  Neck:no JVD, trachea midline, no adenopathy  Lungs: Clear to auscultation  Heart: Irregular rate and rhythm, s1/s2 auscultated, no murmurs  Abdomen: soft, non-tender, bowel sounds active  Extremities: no edema  Neurologic: not done    ECHO: 02/13/2024   Left Ventricle: There is mild increased wall thickness/hypertrophy.   Systolic function is normal with an ejection fraction of 60-65%. No segmental wall motion abnormalities.     Mitral Valve: There is trace regurgitation. There is no evidence of mitral valve stenosis.     Tricuspid Valve: There is trace to mild regurgitation. RVSP calculated at 28 mmHg. RVSP is based on RA pressure of 3 mmHg.     Pulmonic Valve: There is trace regurgitation. There is no evidence of pulmonic valve stenosis.     Aorta: The aortic root is mildly dilated (4.0 cm).     Pericardium: There is no pericardial effusion. A large size cyst in the liver  is visible.      Stress Test 8/28/24:     Stress Combined Conclusion: The study is most consistent with artifact but cannot rule out a small degree of myocardial ischemia. Findings suggest a moderate risk of cardiac events.    Stress Function: Left ventricular function post-stress is normal. Post-stress ejection fraction is 46%. The stress end diastolic cavity size is normal. The stress end systolic cavity size is normal.    Perfusion Comments: Prone images were obtained. Prone imaging was helpful in correcting soft tissue attenuation. LV perfusion is equivocal.    Perfusion Defect: There is a mild severity left ventricular stress perfusion defect present in the apex segment(s). The defect appears to be probable artifact. The possibility of ischemia cannot be excluded.    Perfusion Conclusion: There is no evidence of transient  ischemic dilation (TID). TID ratio is 1.05.    ECG: Resting ECG demonstrates atrial fibrillation.    ECG: The stress ECG was negative for ischemia.    Stress Test: A pharmacological stress test was performed using regadenoson (Lexiscan). PO caffeine given as a reversal agent.    Stress ECG: Arrhythmias during stress: atrial fibrillation. AF RVR The stress ECG was negative for ischemia.    Resting ECG: The ECG shows atrial fibrillation.     Cardiac Angiography:   Not obtained.    Assessment / Acute Cardiac Problems:   A-fib with RVR  Hx of chronic A-fib s/p ablation 08/13/2015  Essential hypertension  Hyperlipidemia  ANJU, on CPAP    Patient Active Problem List:     Chest pain     Paroxysmal atrial fibrillation (HCC)     Acute vasomotor rhinitis     Adenocarcinoma of prostate (HCC)     Morbid obesity due to excess calories (HCC)     Cardiomyopathy (HCC)     Dermatitis     Eczema     Erectile dysfunction following radical prostatectomy     Fear of flying     Gastroesophageal reflux disease without esophagitis     Herpes simplex type 1 infection     Mixed hyperlipidemia     Essential hypertension     Intestinal volvulus (HCC)     Obstructive sleep apnea syndrome     Osteoarthritis of knee     Overactive bladder     Strain of lumbar region     Ventral incisional hernia     Viral upper respiratory tract infection     Atrial fibrillation, controlled (HCC)     S/P prostatectomy     Small bowel obstruction (HCC)     COVID-19     Seasonal allergic rhinitis due to pollen     Abdominal pain, right lower quadrant     Atrial fibrillation with RVR (HCC)     Persistent atrial fibrillation (HCC)     Atrial fibrillation with rapid ventricular response (HCC)      Plan of Treatment:   A-fib- rate still slightly elevated. Continue PO BB & Cardizem. Will increase BB dose for better rate control. Coumadin per pharmacy dosing  Stress test reviewed   Once HR more stable- ok for patient to be discharged per CV standpoint and for him to

## 2024-08-29 NOTE — PROGRESS NOTES
Pharmacy Note  Warfarin Consult follow-up      Recent Labs     08/29/24  0852   INR 2.3     Recent Labs     08/27/24  0215 08/28/24  0612 08/29/24  0559   HGB 15.9 15.2 15.5   HCT 49.4 45.3 47.4   PLT See Reflexed IPF Result See Reflexed IPF Result 146       Significant Drug-Drug Interactions:  New warfarin drug-drug interactions: none  Discontinued drug-drug interactions: none      Notes:                   continue 5 mg daily.   Daily PT/INR while inpatient.

## 2024-08-30 VITALS
BODY MASS INDEX: 37.47 KG/M2 | TEMPERATURE: 98.1 F | DIASTOLIC BLOOD PRESSURE: 86 MMHG | SYSTOLIC BLOOD PRESSURE: 116 MMHG | WEIGHT: 292 LBS | OXYGEN SATURATION: 98 % | HEIGHT: 74 IN | HEART RATE: 110 BPM | RESPIRATION RATE: 18 BRPM

## 2024-08-30 LAB
ANION GAP SERPL CALCULATED.3IONS-SCNC: 10 MMOL/L (ref 9–16)
BUN SERPL-MCNC: 25 MG/DL (ref 8–23)
CALCIUM SERPL-MCNC: 8.7 MG/DL (ref 8.6–10.4)
CHLORIDE SERPL-SCNC: 108 MMOL/L (ref 98–107)
CO2 SERPL-SCNC: 21 MMOL/L (ref 20–31)
CREAT SERPL-MCNC: 1.1 MG/DL (ref 0.7–1.2)
GFR, ESTIMATED: 69 ML/MIN/1.73M2
GLUCOSE SERPL-MCNC: 106 MG/DL (ref 74–99)
INR PPP: 2.1
POTASSIUM SERPL-SCNC: 4 MMOL/L (ref 3.7–5.3)
PROTHROMBIN TIME: 22.8 SEC (ref 11.7–14.9)
SODIUM SERPL-SCNC: 139 MMOL/L (ref 136–145)

## 2024-08-30 PROCEDURE — 6370000000 HC RX 637 (ALT 250 FOR IP): Performed by: NURSE PRACTITIONER

## 2024-08-30 PROCEDURE — 80048 BASIC METABOLIC PNL TOTAL CA: CPT

## 2024-08-30 PROCEDURE — 99233 SBSQ HOSP IP/OBS HIGH 50: CPT | Performed by: NURSE PRACTITIONER

## 2024-08-30 PROCEDURE — 85610 PROTHROMBIN TIME: CPT

## 2024-08-30 PROCEDURE — 2580000003 HC RX 258: Performed by: NURSE PRACTITIONER

## 2024-08-30 PROCEDURE — 6370000000 HC RX 637 (ALT 250 FOR IP): Performed by: INTERNAL MEDICINE

## 2024-08-30 PROCEDURE — 99232 SBSQ HOSP IP/OBS MODERATE 35: CPT

## 2024-08-30 PROCEDURE — 36415 COLL VENOUS BLD VENIPUNCTURE: CPT

## 2024-08-30 RX ORDER — METOPROLOL TARTRATE 100 MG
100 TABLET ORAL 2 TIMES DAILY
Qty: 60 TABLET | Refills: 3 | Status: SHIPPED | OUTPATIENT
Start: 2024-08-30

## 2024-08-30 RX ORDER — DILTIAZEM HCL 60 MG
120 TABLET ORAL 3 TIMES DAILY
Qty: 120 TABLET | Refills: 3 | Status: SHIPPED | OUTPATIENT
Start: 2024-08-30

## 2024-08-30 RX ADMIN — PANTOPRAZOLE SODIUM 40 MG: 40 TABLET, DELAYED RELEASE ORAL at 08:41

## 2024-08-30 RX ADMIN — DILTIAZEM HYDROCHLORIDE 120 MG: 60 TABLET, FILM COATED ORAL at 06:04

## 2024-08-30 RX ADMIN — DILTIAZEM HYDROCHLORIDE 120 MG: 60 TABLET, FILM COATED ORAL at 13:46

## 2024-08-30 RX ADMIN — METOPROLOL TARTRATE 100 MG: 50 TABLET, FILM COATED ORAL at 08:41

## 2024-08-30 RX ADMIN — SODIUM CHLORIDE, PRESERVATIVE FREE 10 ML: 5 INJECTION INTRAVENOUS at 08:41

## 2024-08-30 NOTE — PROGRESS NOTES
..  [facility]  Office: [phone]  Jessee Lan DO, Anish Jack, DO, Ángel Colbert DO, Laurent Root DO, Mile Gibbons MD, Angle Burciaga MD, Bello Belle MD, Catherine Woods MD,  Bryan Adamson MD, Dennis Avila MD, Jimmie Temple MD,  Cesar Perry DO, Divya Woodruff MD, Reyes Roldan MD, Romaine Lan DO, Brenda Beverly MD,  Juno Pearce DO, Irene Parisi MD, Essie Milligan MD, Sridevi Woodall MD, Awa Rogers MD,  Johnie Barrios MD, Diana Be MD, Stan Alejandre MD, Paulino Ham MD, Zeb Isbell MD, Carmen Joshua MD, Mohit Palmer DO, Vijay Quesada DO, Malcolm Flores DO, Kristofer Costa MD,  Reid Rubio MD, Shirley Waterhouse, CNP,  Kimberley Negron, CNP, Mohit Alexandra, CNP,  Berta Quiroz, DNP, Zully Winn, CNP, Qing Duong, CNP, Tammy Galvan, CNP, Elizabeth Allred, CNP, Shereen Dominique, PA-C, Chel Obrien, PA-C, Krysta Menjivar, CNP, Roula Gallegos, CNP,  Ivonne Crawford, CNP, Mell Dutta, CNP, Joanne Mckeon, CNP, Sharmila Carmona, CNS, Carmen Brito, CNP, Kristyn Villatoro, CNP, Tracy Schwab, CNP         [facility]   IN-PATIENT SERVICE   [facility]    Progress Note    8/30/2024    11:35 AM    Name:   Nicholas Izaguirre  MRN:     [id number]     Acct:      [id number]   Room:   0503/0503-01   Day:  3  Admit Date:  8/27/2024  2:04 AM    PCP:   Rodrigo Saeed MD  Code Status:  Full Code    Subjective:     C/C:   Chief Complaint   Patient presents with    Irregular Heart Beat     States that he has a-fib and was having chest pain earlier      Interval History Status: significantly improved.     Patient denies further palpitations or symptoms today.  Heart rate and blood pressure stabilized and within normal limits.  Lab work unremarkable.  Okay for discharge per cardiology.  He will follow-up outpatient for potential ablation or cardioversion as he is still in A-fib that is rate controlled.    Brief History:     Nicholas THRASHER  Zina is a 69 y.o. Non- / non  male who presents with Irregular Heart Beat and is admitted to the hospital for the management of Atrial fibrillation with RVR (HCC).  Past medical history of A-fib on warfarin, CKD, hypertension, hyperlipidemia, sleep apnea on CPAP.     Patient experienced mid sternal chest pain with radiation to left arm which prompted him to come to the ED yesterday 8/27. Patient also complaining of productive cough, he was seen by PCP who prescribed azithromycin and prednisone for potential bronchitis.      In the ED,  Patient had HR in 130s. Lab workup showed slightly elevated creatinine 1.3 baseline creatinine appears to be around 1.  Troponin 11 > 14.  CBC slight leukocytosis with WBC of 12 otherwise unremarkable.  Chest x-ray showed prominent peribronchial markings bilaterally peribronchial edema/bronchiolitis. EKG done in ED showed A-fib RVR.  No ST segment elevation. Patient started on cardizem drip with cardiology consult.      Cardiology changed patient to PO Cardizem, stress test negative.     Review of Systems:     Review of Systems  onstitutional:  Negative for chills, fatigue and fever.   HENT:  Negative for congestion, rhinorrhea and sore throat.    Respiratory:  Positive for non-productive dry cough. Negative for shortness of breath and wheezing.    Cardiovascular: Negative chest pain or palpitations.  Gastrointestinal:  Negative for abdominal pain, nausea and vomiting.   Genitourinary:  Negative for dysuria, hematuria and urgency.   Musculoskeletal:  Negative for myalgias.   Neurological:  Negative for dizziness, light-headedness and headaches.   Psychiatric/Behavioral:  Negative for agitation and confusion.    Medications:     Allergies:  No Known Allergies    Current Meds:   Scheduled Meds:    metoprolol tartrate  100 mg Oral BID    warfarin  5 mg Oral Daily    pantoprazole  40 mg Oral Daily    atorvastatin  20 mg Oral Nightly    sodium chloride flush  5-40 mL

## 2024-08-30 NOTE — DISCHARGE SUMMARY
..  [facility]  Office: [phone]  Jessee Lan DO, Anish Jack DO, Ángel Colbert DO, Laurent Root DO, Mile Gibbons MD, Angle Burciaga MD, Bello Belle MD, Catherine Woods MD,  Bryan Adamson MD, Dennis Avila MD, Jimmie Temple MD,  Cesar Perry DO, Divya Woodruff MD, Reyes Roldan MD, Romaine Lan DO, Brenda Beverly MD,  Juno Pearce DO, Irene Parisi MD, Essie Milligan MD, Sridevi Woodall MD, Awa Rogers MD,  Johnie Barrios MD, Diana Be MD, Stan Alejandre MD, Paulino Ham MD, Zeb Isbell MD, Carmen Joshua MD, Mohit Palmer DO, Vijay Quesada DO, Malcolm Flores DO, Kristofer Costa MD,  Reid Rubio MD, Shirley Waterhouse, CNP,  Kimberley Negron CNP, Mohit Alexandra, CNP,  Berta Quiroz, ALEX, Zully Winn, CNP, Qing Duong CNP, Tammy Galvan, CNP, Elizabeth Allred, CNP, Shereen Dominique, PA-C, Chel Obrien, PA-C, Krysta Menjivar, CNP, Roula Gallegos, CNP,  Ivonne Crawford, CNP, Mell Dutta, CNP, Joanne Mckeon, CNP, Sharmila Carmona, CNS, Carmen Brito, CNP, Kristyn Villatoro, CNP, Tracy Schwab, CNP         [facility]   IN-PATIENT SERVICE   [facility]    Discharge Summary     Patient ID: Nicholas Izaguirre  :  1955   MRN: [id number]     ACCOUNT:  [id number]   Patient's PCP: Rodrigo Saeed MD  Admit Date: 2024   Discharge Date: 2024     Length of Stay: 3  Code Status:  Full Code  Admitting Physician: No admitting provider for patient encounter.  Discharge Physician: Ivonne Crawford, APRN - CNP     Active Discharge Diagnoses:     Hospital Problem Lists:  Principal Problem:    Atrial fibrillation with RVR (HCC)  Active Problems:    Persistent atrial fibrillation (HCC)    Atrial fibrillation with rapid ventricular response (HCC)    Chest pain    Essential hypertension    Obstructive sleep apnea syndrome    Overactive bladder  Resolved Problems:    * No resolved hospital problems. *      Admission Condition:   patients without prior history or evidence of MI 3.  Stress-induced perfusion abnormalities encumbering greater than or equal to 10% myocardium or stress segmental scores indicating multiple vascular territories with abnormalities 4.  Stress-induced LV dilatation (TID ratio greater than 1.19 for exercise and greater than 1.39 for regadenoson) Intermediate risk (1% to 3% annual death or MI) 1.  Mild/moderate resting LV dysfunction (LVEF 35% to 49%) not readily explained by non coronary causes.  2.  Resting perfusion abnormalities in 5%-9.9% of the myocardium in patients without a history or prior evidence of MI 3.  Stress-induced perfusion abnormality encumbering 5%-9.9% of the myocardium or stress segmental scores indicating 1 vascular territory with abnormalities but without LV dilation 4.  Small wall motion abnormality involving 1-2 segments and only 1 coronary bed.  Low Risk (Less than 1% annual death or MI) 1. Normal or small myocardial perfusion defect at rest or with stress encumbering less than 5% of the myocardium.  RECOMMENDATIONS:     XR CHEST PORTABLE    Result Date: 8/28/2024  No acute cardiopulmonary findings     XR CHEST PORTABLE    Result Date: 8/27/2024  Prominent peribronchial markings seen bilaterally which may represent peribronchial edema or bronchiolitis.       Consultations:    Consults:     Final Specialist Recommendations/Findings:   IP CONSULT TO HOSPITALIST  IP CONSULT TO CARDIOLOGY  PHARMACY TO DOSE WARFARIN      The patient was seen and examined on day of discharge and this discharge summary is in conjunction with any daily progress note from day of discharge.    Discharge plan:     Disposition: Home    Physician Follow Up:     Saleem Tripp MD  [address]  [facility] [id number] [phone]    Follow up in 1 week(s)      Rodrigo Saeed MD  [address]  [facility] [address]  [phone]    Follow up in 1 week(s)         Requiring Further

## 2024-08-30 NOTE — PLAN OF CARE
Problem: Discharge Planning  Goal: Discharge to home or other facility with appropriate resources  8/30/2024 1400 by Romy Mcgregor RN  Outcome: Adequate for Discharge  8/30/2024 1009 by Romy Mcgregor RN  Outcome: Progressing     Problem: Safety - Adult  Goal: Free from fall injury  8/30/2024 1400 by Romy Mcgregor RN  Outcome: Adequate for Discharge  8/30/2024 1009 by Romy Mcgregor RN  Outcome: Progressing     Problem: Cardiovascular - Adult  Goal: Maintains optimal cardiac output and hemodynamic stability  8/30/2024 1400 by Romy Mcgregor RN  Outcome: Adequate for Discharge  8/30/2024 1009 by Romy Mcgregor RN  Outcome: Progressing  Goal: Absence of cardiac dysrhythmias or at baseline  8/30/2024 1400 by Romy Mcgregor RN  Outcome: Adequate for Discharge  8/30/2024 1009 by Romy Mcgregor RN  Outcome: Progressing     Problem: Pain  Goal: Verbalizes/displays adequate comfort level or baseline comfort level  8/30/2024 1400 by Romy Mcgregor RN  Outcome: Adequate for Discharge  8/30/2024 1009 by Romy Mcgregor RN  Outcome: Progressing

## 2024-08-30 NOTE — PROGRESS NOTES
[facility]  Progress Note                   Date:   8/30/2024  Patient name: Nicholas Izaguirre  Date of admission:  8/27/2024  2:04 AM  MRN:   [id number]  YOB: 1955  PCP: Rodrigo Saeed MD    Reason for Admission: Persistent atrial fibrillation (HCC) [I48.19]  Atrial fibrillation with rapid ventricular response (HCC) [I48.91]  Atrial fibrillation with RVR (HCC) [I48.91]  Chest pain, unspecified type [R07.9]    Subjective:       Clinical Changes /Abnormalities: Pt seen and examined in the room.  Patient resting in bed. Pt denies any CP or sob.  Labs, vitals and tele reviewed- A-fib 60-80's     Medications:   Scheduled Meds:   metoprolol tartrate  100 mg Oral BID    warfarin  5 mg Oral Daily    pantoprazole  40 mg Oral Daily    atorvastatin  20 mg Oral Nightly    sodium chloride flush  5-40 mL IntraVENous 2 times per day    dilTIAZem  120 mg Oral 3 times per day    warfarin placeholder: dosing by pharmacy   Other RX Placeholder     Continuous Infusions:   sodium chloride       CBC:   Recent Labs     08/28/24  0612 08/29/24  0559   WBC 9.3 9.9   HGB 15.2 15.5   PLT See Reflexed IPF Result 146     BMP:    Recent Labs     08/28/24  0612 08/29/24  0559 08/30/24  0418    139 139   K 4.3 3.9 4.0   * 108* 108*   CO2 25 22 21   BUN 22 23 25*   CREATININE 1.2 1.1 1.1   GLUCOSE 91 87 106*     Hepatic:No results for input(s): \"AST\", \"ALT\", \"BILITOT\", \"ALKPHOS\" in the last 72 hours.    Invalid input(s): \"ALB\"  Troponin:   Recent Labs     08/28/24  0148 08/28/24  0612 08/28/24  1456   TROPHS 16 13 13     BNP: No results for input(s): \"BNP\" in the last 72 hours.  Lipids: No results for input(s): \"CHOL\", \"HDL\" in the last 72 hours.    Invalid input(s): \"LDLCALCU\"  INR:   Recent Labs     08/28/24  0144 08/29/24  0852 08/30/24  0418   INR 2.7 2.3 2.1       Objective:   Vitals: /82   Pulse 98   Temp 97.7 °F (36.5 °C) (Oral)   Resp 18   Ht 1.88 m (6' 2\")   Wt 132.5 kg (292 lb)   demonstrates atrial fibrillation.    ECG: The stress ECG was negative for ischemia.    Stress Test: A pharmacological stress test was performed using regadenoson (Lexiscan). PO caffeine given as a reversal agent.    Stress ECG: Arrhythmias during stress: atrial fibrillation. AF RVR The stress ECG was negative for ischemia.    Resting ECG: The ECG shows atrial fibrillation.     Cardiac Angiography:   Not obtained.    Assessment / Acute Cardiac Problems:   A-fib with RVR  Hx of chronic A-fib s/p ablation 08/13/2015  Essential hypertension  Hyperlipidemia  ANJU, on CPAP    Patient Active Problem List:     Chest pain     Paroxysmal atrial fibrillation (HCC)     Acute vasomotor rhinitis     Adenocarcinoma of prostate (HCC)     Morbid obesity due to excess calories (HCC)     Cardiomyopathy (HCC)     Dermatitis     Eczema     Erectile dysfunction following radical prostatectomy     Fear of flying     Gastroesophageal reflux disease without esophagitis     Herpes simplex type 1 infection     Mixed hyperlipidemia     Essential hypertension     Intestinal volvulus (HCC)     Obstructive sleep apnea syndrome     Osteoarthritis of knee     Overactive bladder     Strain of lumbar region     Ventral incisional hernia     Viral upper respiratory tract infection     Atrial fibrillation, controlled (HCC)     S/P prostatectomy     Small bowel obstruction (HCC)     COVID-19     Seasonal allergic rhinitis due to pollen     Abdominal pain, right lower quadrant     Atrial fibrillation with RVR (HCC)     Persistent atrial fibrillation (HCC)     Atrial fibrillation with rapid ventricular response (HCC)      Plan of Treatment:   A-fib-rate 60-80's Continue PO BB & Cardizem. Will increase BB dose for better rate control. Coumadin per pharmacy dosing  Stress test reviewed   ok for patient to be discharged per CV standpoint and for him to follow up with his primary cardiologist, Dr. Tripp, at which time they can consider repeat ablation

## 2024-08-30 NOTE — PLAN OF CARE
Problem: Discharge Planning  Goal: Discharge to home or other facility with appropriate resources  Outcome: Progressing     Problem: Safety - Adult  Goal: Free from fall injury  Outcome: Progressing     Problem: Cardiovascular - Adult  Goal: Maintains optimal cardiac output and hemodynamic stability  Outcome: Progressing  Goal: Absence of cardiac dysrhythmias or at baseline  Outcome: Progressing     Problem: Pain  Goal: Verbalizes/displays adequate comfort level or baseline comfort level  Outcome: Progressing

## 2024-08-30 NOTE — CARE COORDINATION
Transitional planning-talked with patient. Informed him of discharge. Plan is to go home with his wife, has ride. Not wanting any home care at this time.

## 2024-08-30 NOTE — PROGRESS NOTES
CLINICAL PHARMACY NOTE: MEDS TO BEDS    Total # of Prescriptions Filled: 2   The following medications were delivered to the patient:  Metoprolol  diltiazem    Additional Documentation:   $10.10 collected - deepak  Writer delivered to bedside

## 2024-08-30 NOTE — PROGRESS NOTES
Pharmacy Note  Warfarin Consult follow-up      Recent Labs     08/30/24  0418   INR 2.1     Recent Labs     08/28/24  0612 08/29/24  0559   HGB 15.2 15.5   HCT 45.3 47.4   PLT See Reflexed IPF Result 146       Significant Drug-Drug Interactions:  New warfarin drug-drug interactions: none  Discontinued drug-drug interactions: none      Notes:                     Continue 5 mg daily    Daily PT/INR while inpatient.

## 2024-09-09 NOTE — PROGRESS NOTES
Physician Progress Note      PATIENT:               PADMAJA POWELL  [facility] #:                  [id number]  :                       1955  ADMIT DATE:       2024 2:04 AM  DISCH DATE:        2024 2:21 PM  RESPONDING  PROVIDER #:        Sg Johnson DO          QUERY TEXT:    Patient admitted with chronic atrial fibrillation and is maintained on   Coumadin. If possible, please document in progress notes and discharge summary   if you are evaluating and/or treating any of the following:?  ?  The medical record reflects the following:  Risk Factors: 69 yr old, hx HTN  Clinical Indicators: admitted with Afib with RVR, on Coumadin  Treatment: Coumadin, BB & Cardizem, labs, cardiology consult, consider repeat   ablation versus possible cardioversion as OP    Thank you, Marija GARCIA,RN, CDI  email - [email or web address] [phone]  office hours [id number]  Options provided:  -- Secondary hypercoagulable state in a patient with atrial fibrillation  -- Other - I will add my own diagnosis  -- Disagree - Not applicable / Not valid  -- Disagree - Clinically unable to determine / Unknown  -- Refer to Clinical Documentation Reviewer    PROVIDER RESPONSE TEXT:    This patient has secondary hypercoagulable state in a patient with atrial   fibrillation.    Query created by: Marija Montero on 2024 7:44 AM      QUERY TEXT:    Patient admitted with Afib with RVR, noted to have chest pressure and elevated   troponins.  If possible, please document in the progress notes and discharge   summary if you are evaluating and/or treating any of the following:    The medical record reflects the following:  Risk Factors: Afib with RVR  Clinical Indicators: admitted with atrial fibrillation with RVR, HR in 130's.   Patient was noted to have chest pressure and troponin >20% ranging from   11-26-13. EKG shows nonspecific ST abnormality. Nuclear stress test shows,   study is most consistent with artifact but cannot rule out a small

## 2025-04-05 ENCOUNTER — HOSPITAL ENCOUNTER (EMERGENCY)
Facility: HOSPITAL | Age: 70
Discharge: HOME | End: 2025-04-05
Attending: EMERGENCY MEDICINE
Payer: MEDICARE

## 2025-04-05 ENCOUNTER — HOSPITAL ENCOUNTER (EMERGENCY)
Facility: HOSPITAL | Age: 70
Discharge: HOSPICE | End: 2025-04-05
Attending: EMERGENCY MEDICINE
Payer: MEDICARE

## 2025-04-05 VITALS
TEMPERATURE: 98 F | SYSTOLIC BLOOD PRESSURE: 128 MMHG | DIASTOLIC BLOOD PRESSURE: 68 MMHG | OXYGEN SATURATION: 98 % | RESPIRATION RATE: 18 BRPM | HEART RATE: 67 BPM | BODY MASS INDEX: 17 KG/M2 | WEIGHT: 114.63 LBS

## 2025-04-05 DIAGNOSIS — S02.5XXA CLOSED FRACTURE OF TOOTH, INITIAL ENCOUNTER: ICD-10-CM

## 2025-04-05 DIAGNOSIS — S01.511A LIP LACERATION, INITIAL ENCOUNTER: Primary | ICD-10-CM

## 2025-04-05 PROCEDURE — 12013 RPR F/E/E/N/L/M 2.6-5.0 CM: CPT

## 2025-04-05 PROCEDURE — 99283 EMERGENCY DEPT VISIT LOW MDM: CPT

## 2025-04-05 PROCEDURE — 90471 IMMUNIZATION ADMIN: CPT

## 2025-04-05 RX ORDER — LIDOCAINE HYDROCHLORIDE 10 MG/ML
20 INJECTION, SOLUTION EPIDURAL; INFILTRATION; INTRACAUDAL; PERINEURAL ONCE
Status: COMPLETED | OUTPATIENT
Start: 2025-04-05 | End: 2025-04-05

## 2025-04-05 NOTE — ED INITIAL ASSESSMENT (HPI)
Patient presents to the ED c/o unwitnessed mechanical fall. Laceration to top lip. Pt arrives with chipped teeth and one missing tooth that NH staff placed in a bag. No blood thinner use. A&ox1 at baseline. Pt is on Hospice

## 2025-04-05 NOTE — DISCHARGE INSTRUCTIONS
Have sutures removed in 7 days, you had a total of 3 sutures placed.  Place Neosporin/bacitracin on it twice a day to minimize the risk of infection.    Dentist regarding your dental fractures.    To the ER as needed for new or worrisome symptoms.

## 2025-04-05 NOTE — ED PROVIDER NOTES
Patient Seen in: [facility] Emergency Department      History     Chief Complaint   Patient presents with    Fall     Stated Complaint: Fall; Head Injury    Subjective:   HPI    69-year-old male history of cognitive delay, dementia, hypertension, schizophrenia, TBI, ANO x 1 at baseline, patient is hospice, spoke with  who stated that patient had a ground-level fall, small lip black, they discussed this with the hospice team who had spoken with the POA and wanted evaluation only for sutures, did not want any imaging workup, care for the dental fractures, or any other investigation of the patient's injuries at this time.  I spoke with this physician directly who notified me of this information.  On arrival patient appears comfortable, not actively bleeding, does not recall the fall.    Objective:     Past Medical History:    Anemia    Anxiety    Cognitive and behavioral changes    Constipation    Dementia (HCC)    Depression    Essential hypertension    Prostate disorder    Schizophrenia (HCC)    Traumatic brain injury (HCC)              History reviewed. No pertinent surgical history.             Social History     Socioeconomic History    Marital status: Single   Tobacco Use    Smoking status: Never    Smokeless tobacco: Never                  Physical Exam     ED Triage Vitals [04/05/25 1218]   /86   Pulse 75   Resp 18   Temp 98.2 °F (36.8 °C)   Temp src Oral   SpO2 99 %   O2 Device        Current Vitals:   Vital Signs  BP: 132/77  Pulse: 70  Resp: 18  Temp: 98.2 °F (36.8 °C)  Temp src: Oral  MAP (mmHg): 92    Oxygen Therapy  SpO2: 99 %        Physical Exam  Vitals reviewed.   Constitutional:       Appearance: Normal appearance.   HENT:      Head: Normocephalic.      Mouth/Throat:      Comments: First front incisors fractured, questionable exposure of pulp on the right, hemostatic.  Small 3 cm curvilinear laceration with tip crossing left upper vermilion border, no foreign  bodies.  Neurological:      Mental Status: He is alert.      Comments: ANO x 1 at baseline.           ED Course   Labs Reviewed - No data to display                MDM          69-year-old hospice history as above who presents for evaluation of ground-level fall, spoke with physician who had spoken with the POA and hospice team and stated that they only wanted patient evaluated for possible sutures, they are aware about the dental fractures, they are aware about the fall, they do not want any imaging, fall workup or other.  Unknown last tetanus will update, suture repaired see procedure note, discharged back to hospice facility.    Regarding patient's dental fractures I also spoke with Dr. Oneal, aware and will evaluate patient upon return to their facility and schedule dental follow-up.    Medical Decision Making      Procedures   Lac Repair  Performed by: Delbert Valenzuela  Authorized by: Delbert Valenzuela   Consent: Verbal consent obtained.  Risks and benefits: risks, benefits and alternatives were discussed  Consent given by: patient   Patient or guardian understanding: patient or guardian states understanding of the procedure being performed  Patient or guardian consent: the patient or guardian's understanding of the procedure matches consent given  Patient identity confirmed: arm band and verbally with patient  Time out: Immediately prior to procedure a \"time out\" was called to verify the correct patient, procedure, equipment, support staff and site/side marked as required.  Body area: upper lip  Laceration length: 3 cm  Foreign bodies: no foreign bodies  Tendon involvement: none  Nerve involvement: none  Vascular damage: no  Anesthesia: local infiltration  Local anesthetic: lidocaine with epinephrine  Anesthetic total: 2 ml  Patient sedated: no  Preparation: Patient was prepped and draped in the usual sterile fashion.  Irrigation solution: saline  Irrigation method: jet lavage and syringe  Amount of  cleaning: extensive  Debridement: none  Degree of undermining: none  Skin closure: 5-0 nylon  Number of sutures: 3  Technique: interupted  Approximation: close  Approximation difficulty: simple  Dressing: antibiotic ointment/4x4 gauze  Patient tolerance: Patient tolerated the procedure well with no immediate complications.          Disposition and Plan     Clinical Impression:  1. Lip laceration, initial encounter    2. Closed fracture of tooth, initial encounter         Disposition:  Discharge  4/5/2025  1:07 pm    Follow-up:  Susy Hdz MD  [address] [place] [id number]  [phone]    Follow up in 3 day(s)  As needed    We recommend that you schedule follow up care with a primary care provider within the next three months to obtain basic health screening including reassessment of your blood pressure.      Medications Prescribed:  Current Discharge Medication List              Supplementary Documentation:

## 2025-08-15 ENCOUNTER — APPOINTMENT (OUTPATIENT)
Dept: CT IMAGING | Age: 70
End: 2025-08-15
Payer: MEDICARE

## 2025-08-15 ENCOUNTER — HOSPITAL ENCOUNTER (EMERGENCY)
Age: 70
Discharge: HOME | End: 2025-08-15
Attending: EMERGENCY MEDICINE
Payer: MEDICARE

## 2025-08-15 VITALS
OXYGEN SATURATION: 97 % | SYSTOLIC BLOOD PRESSURE: 136 MMHG | WEIGHT: 315 LBS | RESPIRATION RATE: 24 BRPM | DIASTOLIC BLOOD PRESSURE: 77 MMHG | TEMPERATURE: 97.3 F | BODY MASS INDEX: 40.43 KG/M2 | HEART RATE: 53 BPM | HEIGHT: 74 IN

## 2025-08-15 DIAGNOSIS — N20.0 NEPHROLITHIASIS: Primary | ICD-10-CM

## 2025-08-15 LAB
ALBUMIN SERPL-MCNC: 4.4 G/DL (ref 3.5–5.2)
ALBUMIN/GLOB SERPL: 1.8 {RATIO} (ref 1–2.5)
ALP SERPL-CCNC: 88 U/L (ref 40–129)
ALT SERPL-CCNC: 16 U/L (ref 10–50)
ANION GAP SERPL CALCULATED.3IONS-SCNC: 11 MMOL/L (ref 9–16)
AST SERPL-CCNC: 18 U/L (ref 10–50)
BACTERIA URNS QL MICRO: ABNORMAL
BASOPHILS # BLD: 0.04 K/UL (ref 0–0.2)
BASOPHILS NFR BLD: 1 % (ref 0–2)
BILIRUB SERPL-MCNC: 0.9 MG/DL (ref 0–1.2)
BILIRUB UR QL STRIP: NEGATIVE
BUN SERPL-MCNC: 13 MG/DL (ref 8–23)
CALCIUM SERPL-MCNC: 9.5 MG/DL (ref 8.6–10.4)
CASTS #/AREA URNS LPF: ABNORMAL /LPF (ref 0–8)
CHLORIDE SERPL-SCNC: 106 MMOL/L (ref 98–107)
CLARITY UR: ABNORMAL
CO2 SERPL-SCNC: 25 MMOL/L (ref 20–31)
COLOR UR: YELLOW
CREAT SERPL-MCNC: 1.2 MG/DL (ref 0.7–1.2)
EOSINOPHIL # BLD: 0.15 K/UL (ref 0–0.44)
EOSINOPHILS RELATIVE PERCENT: 2 % (ref 1–4)
EPI CELLS #/AREA URNS HPF: ABNORMAL /HPF (ref 0–5)
ERYTHROCYTE [DISTWIDTH] IN BLOOD BY AUTOMATED COUNT: 14.6 % (ref 11.8–14.4)
GFR, ESTIMATED: 65 ML/MIN/1.73M2
GLUCOSE SERPL-MCNC: 105 MG/DL (ref 74–99)
GLUCOSE UR STRIP-MCNC: NEGATIVE MG/DL
HCT VFR BLD AUTO: 44.6 % (ref 40.7–50.3)
HGB BLD-MCNC: 15 G/DL (ref 13–17)
HGB UR QL STRIP.AUTO: ABNORMAL
IMM GRANULOCYTES # BLD AUTO: 0.03 K/UL (ref 0–0.3)
IMM GRANULOCYTES NFR BLD: 0 %
INR PPP: 2.5
KETONES UR STRIP-MCNC: NEGATIVE MG/DL
LEUKOCYTE ESTERASE UR QL STRIP: ABNORMAL
LYMPHOCYTES NFR BLD: 1.14 K/UL (ref 1.1–3.7)
LYMPHOCYTES RELATIVE PERCENT: 15 % (ref 24–43)
MCH RBC QN AUTO: 27.7 PG (ref 25.2–33.5)
MCHC RBC AUTO-ENTMCNC: 33.6 G/DL (ref 28.4–34.8)
MCV RBC AUTO: 82.4 FL (ref 82.6–102.9)
MONOCYTES NFR BLD: 0.55 K/UL (ref 0.1–1.2)
MONOCYTES NFR BLD: 7 % (ref 3–12)
NEUTROPHILS NFR BLD: 75 % (ref 36–65)
NEUTS SEG NFR BLD: 5.7 K/UL (ref 1.5–8.1)
NITRITE UR QL STRIP: NEGATIVE
NRBC BLD-RTO: 0 PER 100 WBC
PH UR STRIP: 6 [PH] (ref 5–8)
PLATELET # BLD AUTO: 151 K/UL (ref 138–453)
PMV BLD AUTO: 11.3 FL (ref 8.1–13.5)
POTASSIUM SERPL-SCNC: 4.1 MMOL/L (ref 3.7–5.3)
PROT SERPL-MCNC: 6.9 G/DL (ref 6.6–8.7)
PROT UR STRIP-MCNC: ABNORMAL MG/DL
PROTHROMBIN TIME: 28.5 SEC (ref 11.7–14.9)
RBC # BLD AUTO: 5.41 M/UL (ref 4.21–5.77)
RBC # BLD: ABNORMAL 10*6/UL
RBC #/AREA URNS HPF: ABNORMAL /HPF (ref 0–4)
SODIUM SERPL-SCNC: 142 MMOL/L (ref 136–145)
SP GR UR STRIP: 1.01 (ref 1–1.03)
TROPONIN I SERPL HS-MCNC: 14 NG/L (ref 0–22)
UROBILINOGEN UR STRIP-ACNC: NORMAL EU/DL (ref 0–1)
WBC #/AREA URNS HPF: ABNORMAL /HPF (ref 0–5)
WBC OTHER # BLD: 7.6 K/UL (ref 3.5–11.3)

## 2025-08-15 PROCEDURE — 81001 URINALYSIS AUTO W/SCOPE: CPT

## 2025-08-15 PROCEDURE — 85610 PROTHROMBIN TIME: CPT

## 2025-08-15 PROCEDURE — 80053 COMPREHEN METABOLIC PANEL: CPT

## 2025-08-15 PROCEDURE — 85025 COMPLETE CBC W/AUTO DIFF WBC: CPT

## 2025-08-15 PROCEDURE — 96374 THER/PROPH/DIAG INJ IV PUSH: CPT | Performed by: EMERGENCY MEDICINE

## 2025-08-15 PROCEDURE — 99284 EMERGENCY DEPT VISIT MOD MDM: CPT | Performed by: EMERGENCY MEDICINE

## 2025-08-15 PROCEDURE — 87086 URINE CULTURE/COLONY COUNT: CPT

## 2025-08-15 PROCEDURE — 87088 URINE BACTERIA CULTURE: CPT

## 2025-08-15 PROCEDURE — 84484 ASSAY OF TROPONIN QUANT: CPT

## 2025-08-15 PROCEDURE — 87186 SC STD MICRODIL/AGAR DIL: CPT

## 2025-08-15 PROCEDURE — 96376 TX/PRO/DX INJ SAME DRUG ADON: CPT | Performed by: EMERGENCY MEDICINE

## 2025-08-15 PROCEDURE — 6360000002 HC RX W HCPCS: Performed by: STUDENT IN AN ORGANIZED HEALTH CARE EDUCATION/TRAINING PROGRAM

## 2025-08-15 PROCEDURE — 93005 ELECTROCARDIOGRAM TRACING: CPT | Performed by: STUDENT IN AN ORGANIZED HEALTH CARE EDUCATION/TRAINING PROGRAM

## 2025-08-15 PROCEDURE — 96375 TX/PRO/DX INJ NEW DRUG ADDON: CPT | Performed by: EMERGENCY MEDICINE

## 2025-08-15 PROCEDURE — 6370000000 HC RX 637 (ALT 250 FOR IP): Performed by: STUDENT IN AN ORGANIZED HEALTH CARE EDUCATION/TRAINING PROGRAM

## 2025-08-15 PROCEDURE — 74176 CT ABD & PELVIS W/O CONTRAST: CPT

## 2025-08-15 RX ORDER — DILTIAZEM HYDROCHLORIDE 30 MG/1
60 TABLET, FILM COATED ORAL ONCE
Status: COMPLETED | OUTPATIENT
Start: 2025-08-15 | End: 2025-08-15

## 2025-08-15 RX ORDER — METOPROLOL TARTRATE 50 MG
100 TABLET ORAL ONCE
Status: DISCONTINUED | OUTPATIENT
Start: 2025-08-15 | End: 2025-08-15

## 2025-08-15 RX ORDER — ONDANSETRON 4 MG/1
4 TABLET, ORALLY DISINTEGRATING ORAL 3 TIMES DAILY PRN
Qty: 21 TABLET | Refills: 0 | Status: SHIPPED | OUTPATIENT
Start: 2025-08-15

## 2025-08-15 RX ORDER — PHENAZOPYRIDINE HYDROCHLORIDE 100 MG/1
100 TABLET, FILM COATED ORAL ONCE
Status: COMPLETED | OUTPATIENT
Start: 2025-08-15 | End: 2025-08-15

## 2025-08-15 RX ORDER — ACETAMINOPHEN 500 MG
1000 TABLET ORAL EVERY 6 HOURS PRN
Qty: 40 TABLET | Refills: 0 | Status: SHIPPED | OUTPATIENT
Start: 2025-08-15 | End: 2025-08-20

## 2025-08-15 RX ORDER — IBUPROFEN 600 MG/1
600 TABLET, FILM COATED ORAL 4 TIMES DAILY PRN
Qty: 40 TABLET | Refills: 0 | Status: SHIPPED | OUTPATIENT
Start: 2025-08-15

## 2025-08-15 RX ORDER — PHENAZOPYRIDINE HYDROCHLORIDE 200 MG/1
200 TABLET, FILM COATED ORAL 3 TIMES DAILY PRN
Qty: 6 TABLET | Refills: 0 | Status: SHIPPED | OUTPATIENT
Start: 2025-08-15 | End: 2025-08-18

## 2025-08-15 RX ORDER — TAMSULOSIN HYDROCHLORIDE 0.4 MG/1
0.4 CAPSULE ORAL DAILY
Status: DISCONTINUED | OUTPATIENT
Start: 2025-08-15 | End: 2025-08-15 | Stop reason: HOSPADM

## 2025-08-15 RX ORDER — ACETAMINOPHEN 500 MG
1000 TABLET ORAL ONCE
Status: COMPLETED | OUTPATIENT
Start: 2025-08-15 | End: 2025-08-15

## 2025-08-15 RX ORDER — KETOROLAC TROMETHAMINE 15 MG/ML
15 INJECTION, SOLUTION INTRAMUSCULAR; INTRAVENOUS ONCE
Status: COMPLETED | OUTPATIENT
Start: 2025-08-15 | End: 2025-08-15

## 2025-08-15 RX ORDER — OXYCODONE HYDROCHLORIDE 5 MG/1
5 TABLET ORAL EVERY 6 HOURS PRN
Qty: 16 TABLET | Refills: 0 | Status: SHIPPED | OUTPATIENT
Start: 2025-08-15 | End: 2025-08-19

## 2025-08-15 RX ORDER — TAMSULOSIN HYDROCHLORIDE 0.4 MG/1
0.4 CAPSULE ORAL DAILY
Qty: 7 CAPSULE | Refills: 0 | Status: SHIPPED | OUTPATIENT
Start: 2025-08-15 | End: 2025-08-22

## 2025-08-15 RX ORDER — ONDANSETRON 2 MG/ML
4 INJECTION INTRAMUSCULAR; INTRAVENOUS
Status: COMPLETED | OUTPATIENT
Start: 2025-08-15 | End: 2025-08-15

## 2025-08-15 RX ADMIN — ACETAMINOPHEN 1000 MG: 500 TABLET ORAL at 14:31

## 2025-08-15 RX ADMIN — TAMSULOSIN HYDROCHLORIDE 0.4 MG: 0.4 CAPSULE ORAL at 14:31

## 2025-08-15 RX ADMIN — ONDANSETRON 4 MG: 2 INJECTION, SOLUTION INTRAMUSCULAR; INTRAVENOUS at 09:34

## 2025-08-15 RX ADMIN — DILTIAZEM HYDROCHLORIDE 60 MG: 30 TABLET, FILM COATED ORAL at 09:34

## 2025-08-15 RX ADMIN — PHENAZOPYRIDINE HYDROCHLORIDE 100 MG: 100 TABLET ORAL at 14:31

## 2025-08-15 RX ADMIN — KETOROLAC TROMETHAMINE 15 MG: 15 INJECTION, SOLUTION INTRAMUSCULAR; INTRAVENOUS at 15:14

## 2025-08-15 RX ADMIN — KETOROLAC TROMETHAMINE 15 MG: 15 INJECTION, SOLUTION INTRAMUSCULAR; INTRAVENOUS at 09:34

## 2025-08-15 ASSESSMENT — PAIN SCALES - GENERAL
PAINLEVEL_OUTOF10: 7
PAINLEVEL_OUTOF10: 5
PAINLEVEL_OUTOF10: 6
PAINLEVEL_OUTOF10: 10

## 2025-08-15 ASSESSMENT — PAIN DESCRIPTION - ORIENTATION
ORIENTATION: RIGHT;LOWER

## 2025-08-15 ASSESSMENT — PAIN DESCRIPTION - LOCATION
LOCATION: ABDOMEN;GROIN

## 2025-08-15 ASSESSMENT — PAIN DESCRIPTION - DESCRIPTORS
DESCRIPTORS: ACHING

## 2025-08-15 ASSESSMENT — PAIN - FUNCTIONAL ASSESSMENT
PAIN_FUNCTIONAL_ASSESSMENT: ACTIVITIES ARE NOT PREVENTED
PAIN_FUNCTIONAL_ASSESSMENT: 0-10
PAIN_FUNCTIONAL_ASSESSMENT: ACTIVITIES ARE NOT PREVENTED
PAIN_FUNCTIONAL_ASSESSMENT: 0-10

## 2025-08-15 ASSESSMENT — PAIN DESCRIPTION - PAIN TYPE
TYPE: ACUTE PAIN
TYPE: ACUTE PAIN

## 2025-08-16 LAB
MICROORGANISM SPEC CULT: ABNORMAL
SERVICE CMNT-IMP: ABNORMAL
SPECIMEN DESCRIPTION: ABNORMAL
